# Patient Record
Sex: MALE | Race: WHITE | NOT HISPANIC OR LATINO | Employment: OTHER | URBAN - METROPOLITAN AREA
[De-identification: names, ages, dates, MRNs, and addresses within clinical notes are randomized per-mention and may not be internally consistent; named-entity substitution may affect disease eponyms.]

---

## 2017-06-02 ENCOUNTER — OFFICE VISIT (OUTPATIENT)
Dept: DERMATOLOGY | Facility: CLINIC | Age: 67
End: 2017-06-02
Payer: COMMERCIAL

## 2017-06-02 DIAGNOSIS — L81.4 SOLAR LENTIGINOSIS: ICD-10-CM

## 2017-06-02 DIAGNOSIS — Z86.018 HISTORY OF DYSPLASTIC NEVUS: Primary | ICD-10-CM

## 2017-06-02 DIAGNOSIS — L73.9 FOLLICULITIS: ICD-10-CM

## 2017-06-02 DIAGNOSIS — L57.0 ACTINIC KERATOSIS: ICD-10-CM

## 2017-06-02 PROCEDURE — 99207 ZZC NO BILLABLE SERVICE THIS VISIT: CPT | Performed by: DERMATOLOGY

## 2017-06-02 RX ORDER — MUPIROCIN 20 MG/G
OINTMENT TOPICAL
Qty: 22 G | Refills: 0 | Status: SHIPPED | OUTPATIENT
Start: 2017-06-02 | End: 2017-10-17

## 2017-06-02 NOTE — NURSING NOTE
Dermatology Rooming Note    Duane W Dahlstrom's goals for this visit include:   Chief Complaint   Patient presents with     Derm Problem     6 month skin check hx of AK and DN       Is a scribe okay for this visit:YES    Are records needed for this visit(If yes, obtain release of information): Not applicable     Vitals: There were no vitals taken for this visit.    Referring Provider:  No referring provider defined for this encounter.

## 2017-06-02 NOTE — PATIENT INSTRUCTIONS
Cryotherapy    What is it?    Use of a very cold liquid, such as liquid nitrogen, to freeze and destroy abnormal skin cells that need to be removed    What should I expect?    Tenderness and redness    A small blister that might grow and fill with dark purple blood. There may be crusting.    More than one treatment may be needed if the lesions do not go away.    How do I care for the treated area?    Gently wash the area with your hands when bathing.    Use a thin layer of Vaseline to help with healing. You may use a Band-Aid.     The area should heal within 7-10 days and may leave behind a pink or lighter color.     Do not use an antibiotic or Neosporin ointment.     You may take acetaminophen (Tylenol) for pain.     Call your Doctor if you have:    Severe pain    Signs of infection (warmth, redness, cloudy yellow drainage, and or a bad smell)    Questions or concerns    Who should I call with questions?       [facility]: [phone]       [facility]: [phone]       For urgent needs outside of business hours call the [facility] at [phone]        and ask for the dermatology resident on call

## 2017-06-02 NOTE — PROGRESS NOTES
[facility] Dermatology Note      Dermatology Problem List:  1. Actinic Keratosis   -s/p cryotherapy  2. History of atypical/dysplastic nevi  -Junctional nevus with atypical features, right (lateral) thigh, s/p excision 12/20/2016  -Junctional nevus with moderate dysplasia, lower paraspinal back, s/p biopsy 11/14/2016  -Junctional dysplastic nevus with mild atypia, left chest, s/p biopsy 4/10/2015  -Lentiginous compound melanocytic nevus with mild atypia, upper back, s/p biopsy 4/10/2015  -Patient reports two prior atypical lesions in 1980  3. Neurofibroma, upper mid back, s/p biopsy 11/9/2015  4. Folliculitis,     Encounter Date: Jun 2, 2017    CC:  Chief Complaint   Patient presents with     Derm Problem       History of Present Illness:  Mr. Duane W Dahlstrom is a 66 year old male who presents as a follow-up for history of atypical/dysplastic nevi. The patient was last seen 12/20/2016 by Dr. Nguyen when an atypical nevus on the right (lateral) thigh was treated with excision. Today, the patient reports the tip of his nose can be sore and red for the past few months. He has also noticed some new lesions on his right index finger. Family has noticed redness. No changes in medical history.   Past Medical History:   Patient Active Problem List   Diagnosis     Varicose veins     CARDIOVASCULAR SCREENING; LDL GOAL LESS THAN 160     Advanced directives, counseling/discussion     Past Medical History:   Diagnosis Date     Allergic rhinitis, cause unspecified     Seasonal rhinitis     Syncope and collapse 1998     Varicose veins of leg      Past Surgical History:   Procedure Laterality Date     COLONOSCOPY  12/01/08     COLONOSCOPY N/A 11/19/2015    Procedure: COLONOSCOPY;  Surgeon: Alonso Gaitan MD;  Location:  GI     ENDOSCOPIC LIGATION VEIN(S)       HC COLONOSCOPY THRU STOMA WITH BIOPSY  1996       Social History:  The patient works as a  at [employer].  The patient uses 14 alcoholic beverages per week.  The patient denies use of tanning beds.     Family History:  There is no family history of skin cancer.  There is no family history of asthma, psoriasis, eczema or hay fever    Medications:  Current Outpatient Prescriptions   Medication Sig Dispense Refill     TYLENOL 325 MG OR TABS 1-2 TABS EVERY 4 HOURS AS NEEDED       IBUPROFEN 200 MG OR CAPS 1-3 CAPS EVERY 6 HOURS AS NEEDED for pain/fever         Allergies   Allergen Reactions     No Known Drug Allergies      Review of Systems:  -Skin: As above in HPI. No additional skin concerns.  -Const: The patient is generally feeling well today.  No recent illnesses    Physical exam:  There were no vitals taken for this visit.  GEN: This is a well developed, well-nourished male in no acute distress, in a pleasant mood.    SKIN: Total skin excluding the undergarment areas was performed. The exam included the head/face, neck, both arms, chest, back, abdomen, both legs, digits and/or nails. Declines genital exam  -There are multiple well healed surgical scars without erythema, nodularity or telangiectasias. No re pigmentation on the left para spinal lower back.   - There is an erythematous macule with overyling adherent scale on the tip of the nose.  -Multiple regular brown pigmented macules and papules are identified on the trunk and extremeties.   -Scattered brown macules on sun exposed areas.  -right index finger 1mm lesion that expresses pus with gentle pressure consistent with folliculitis  -hyperkeratotic papule on the right index DIP  -No other lesions of concern on areas examined.   Impression/Plan:  1. History of atypical nevi and dysplastic nevi, no clinical evidence or recurrence    ABCD's of melanoma were reviewed with patient and handout provided.   2. Actinic keratosis on the tip of the nose  Cryotherapy procedure note: After verbal consent and discussion of risks and benefits including but no limited to  dyspigmentation/scar, blister, and pain, 1 were treated with 1-2mm freeze border for 1 cycle with liquid nitrogen. Post cryotherapy instructions were provided  3. Multiple benign Nevi, trunk and extremities    No further intervention required at this time.   4. Solar Lentigenies    Sun precaution was advised including the use of sun screens of SPF 30 or higher, sun protective clothing, and avoidance of tanning beds.  5. Hyperkeratotic papule, right index finger- callus versus giant cell tumor of tendon sheath versus digital myoid cyst versus other    Declines biopsy. He will report any changes  6. Folliculitis- right index finger, expressed in clinic today  Mupirocin 2% ointment, apply twice daily for 3 weeks  Reviewed signs of infection     Follow up in 1, earlier for new or changing lesions.     Staff Involved:  Scribe/Staff      Scribe Disclosure:   IRonan, am serving as a scribe to document services personally performed by Dr. Lulú Fish, based on data collection and the provider's statements to me.    Scribe Disclosure:   I, Marlene Faith, am serving as a scribe to document services personally performed by Dr. Lulú Fish, based on data collection and the provider's statements to me.

## 2017-06-02 NOTE — MR AVS SNAPSHOT
After Visit Summary   6/2/2017    Duane W Dahlstrom    MRN: [id number]           Patient Information     Date Of Birth          1950        Visit Information        Provider Department      6/2/2017 9:30 AM Lulú Fish MD [facility] [[email or web address]]        Today's Diagnoses     History of dysplastic nevus    -  1    Folliculitis        Actinic keratosis        Solar lentiginosis          Care Instructions    Cryotherapy    What is it?    Use of a very cold liquid, such as liquid nitrogen, to freeze and destroy abnormal skin cells that need to be removed    What should I expect?    Tenderness and redness    A small blister that might grow and fill with dark purple blood. There may be crusting.    More than one treatment may be needed if the lesions do not go away.    How do I care for the treated area?    Gently wash the area with your hands when bathing.    Use a thin layer of Vaseline to help with healing. You may use a Band-Aid.     The area should heal within 7-10 days and may leave behind a pink or lighter color.     Do not use an antibiotic or Neosporin ointment.     You may take acetaminophen (Tylenol) for pain.     Call your Doctor if you have:    Severe pain    Signs of infection (warmth, redness, cloudy yellow drainage, and or a bad smell)    Questions or concerns    Who should I call with questions?       [facility]: [phone]       [facility]: [phone]       For urgent needs outside of business hours call the [facility] at [phone]        and ask for the dermatology resident on call            Follow-ups after your visit        Your next 10 appointments already scheduled     Nov 14, 2017 10:45 AM CST   Return Visit with Lulú Fish MD   [facility] [[email or web address]] ([facility] [[email or web address]])    [address] [address] [place] [address] [phone]              Who to contact     If you have  questions or need follow up information about today's clinic visit or your schedule please contact [facility] directly at [phone].  Normal or non-critical lab and imaging results will be communicated to you by [employer], letter or phone within 4 business days after the clinic has received the results. If you do not hear from us within 7 days, please contact the clinic through [employer] or phone. If you have a critical or abnormal lab result, we will notify you by phone as soon as possible.  Submit refill requests through [employer] or call your pharmacy and they will forward the refill request to us. Please allow 3 business days for your refill to be completed.          Additional Information About Your Visit        [employer] Information     [employer] is an electronic gateway that provides easy, online access to your medical records. With [employer], you can request a clinic appointment, read your test results, renew a prescription or communicate with your care team.     To sign up for [employer] visit the website at [email or web address]   You will be asked to enter the access code listed below, as well as some personal information. Please follow the directions to create your username and password.     Your access code is: [email or web address]  Expires: 2017  9:59 AM     Your access code will  in 90 days. If you need help or a new code, please contact your [facility] or call [phone] for assistance.        Care EveryWhere ID     This is your Care EveryWhere ID. This could be used by other organizations to access your [place] medical records  [id number]         Blood Pressure from Last 3 Encounters:   16 131/86   16 118/60   11/19/15 99/76    Weight from Last 3 Encounters:   10/05/16 78.5 kg (173 lb 1.6 oz)   16 78.5 kg (173 lb 1.6 oz)   11/19/15 79.4 kg (175 lb)              Today, you had the following     No orders found for display          Today's Medication Changes          These changes are accurate as of: 6/2/17  9:59 AM.  If you have any questions, ask your nurse or doctor.               Start taking these medicines.        Dose/Directions    mupirocin 2 % ointment   Commonly known as:  BACTROBAN   Used for:  Folliculitis   Started by:  Lulú Fish MD        Apply twice daily for 3 weeks.   Quantity:  22 g   Refills:  0            Where to get your medicines      These medications were sent to [id number] - [place], [place] - [address] [place] [address]  [address] [place] [id number]    Hours:  M-F 8:30-6:30; Sat 9-4; closed Sunday Phone:  [phone]     mupirocin 2 % ointment                Primary Care Provider Office Phone # Fax #    Riccardo Levy [phone]       [facility] [address] [facility] [address]        Thank you!     Thank you for choosing [facility]  for your care. Our goal is always to provide you with excellent care. Hearing back from our patients is one way we can continue to improve our services. Please take a few minutes to complete the written survey that you may receive in the mail after your visit with us. Thank you!             Your Updated Medication List - Protect others around you: Learn how to safely use, store and throw away your medicines at www.disposemymeds.org.          This list is accurate as of: 6/2/17  9:59 AM.  Always use your most recent med list.                   Brand Name Dispense Instructions for use    ibuprofen 200 MG capsule      1-3 CAPS EVERY 6 HOURS AS NEEDED for pain/fever       mupirocin 2 % ointment    BACTROBAN    22 g    Apply twice daily for 3 weeks.       TYLENOL 325 MG tablet   Generic drug:  acetaminophen      1-2 TABS EVERY 4 HOURS AS NEEDED

## 2017-10-16 ENCOUNTER — TELEPHONE (OUTPATIENT)
Dept: FAMILY MEDICINE | Facility: OTHER | Age: 67
End: 2017-10-16

## 2017-10-16 NOTE — TELEPHONE ENCOUNTER
Reason for Call:  Same Day Appointment, Requested Provider:  iRccardo Levy M.D.    PCP: Riccardo Levy    Reason for visit: knee pain/shoulder pain, started training for a marathon in January, 2017, he has stopped running but still painful.  He states the shoulder pain is from sleeping with arm over head.    Duration of symptoms: ongoing    Have you been treated for this in the past?     Additional comments: would like to be worked in this week    Can we leave a detailed message on this number? YES    Phone number patient can be reached at: Cell number on file:    Telephone Information:   Mobile [phone]       Best Time: any    Call taken on 10/16/2017 at 12:30 PM by Jonathan Olsen

## 2017-10-16 NOTE — TELEPHONE ENCOUNTER
OK for mignon Contreras Only or Open appointment, not Same Day, not Acute. Please call patient  to schedule time.  Electronically signed by Riccardo Levy MD

## 2017-10-17 ENCOUNTER — OFFICE VISIT (OUTPATIENT)
Dept: FAMILY MEDICINE | Facility: OTHER | Age: 67
End: 2017-10-17
Payer: COMMERCIAL

## 2017-10-17 ENCOUNTER — RADIANT APPOINTMENT (OUTPATIENT)
Dept: GENERAL RADIOLOGY | Facility: OTHER | Age: 67
End: 2017-10-17
Attending: PHYSICIAN ASSISTANT
Payer: COMMERCIAL

## 2017-10-17 VITALS
OXYGEN SATURATION: 98 % | DIASTOLIC BLOOD PRESSURE: 64 MMHG | BODY MASS INDEX: 24.71 KG/M2 | WEIGHT: 176.5 LBS | TEMPERATURE: 97.7 F | HEART RATE: 77 BPM | HEIGHT: 71 IN | RESPIRATION RATE: 16 BRPM | SYSTOLIC BLOOD PRESSURE: 118 MMHG

## 2017-10-17 DIAGNOSIS — M25.561 PAIN IN BOTH KNEES, UNSPECIFIED CHRONICITY: ICD-10-CM

## 2017-10-17 DIAGNOSIS — M25.511 RIGHT ANTERIOR SHOULDER PAIN: ICD-10-CM

## 2017-10-17 DIAGNOSIS — M25.562 PAIN IN BOTH KNEES, UNSPECIFIED CHRONICITY: ICD-10-CM

## 2017-10-17 DIAGNOSIS — M25.561 RIGHT KNEE PAIN, UNSPECIFIED CHRONICITY: Primary | ICD-10-CM

## 2017-10-17 PROCEDURE — 73560 X-RAY EXAM OF KNEE 1 OR 2: CPT | Mod: RT

## 2017-10-17 PROCEDURE — 99214 OFFICE O/P EST MOD 30 MIN: CPT | Performed by: PHYSICIAN ASSISTANT

## 2017-10-17 ASSESSMENT — PAIN SCALES - GENERAL: PAINLEVEL: NO PAIN (0)

## 2017-10-17 NOTE — MR AVS SNAPSHOT
After Visit Summary   10/17/2017    Duane W Dahlstrom    MRN: [id number]           Patient Information     Date Of Birth          1950        Visit Information        Provider Department      10/17/2017 3:40 PM My Baez PA-C [facility]        Today's Diagnoses     Right knee pain, unspecified chronicity    -  1    Right anterior shoulder pain        Pain in both knees, unspecified chronicity          Care Instructions    I will get xray's of the knees to evaluate for arthritis changes and will contact you with results. I would like you to use ibuprofen and ice for inflammation and will refer you to see sports medicine for further evaluation and treatment.           Follow-ups after your visit        Additional Services     ORTHO  REFERRAL       [facility] is referring you to the Orthopedic  Services at [facility].       The  Representative will assist you in the coordination of your Orthopedic and Musculoskeletal Care as prescribed by your physician.    The  Representative will call you within 1 business day to help schedule your appointment, or you may contact the  Representative at:    All areas ~ [phone]     Type of Referral : Non Surgical       Timeframe requested: Routine    Coverage of these services is subject to the terms and limitations of your health insurance plan.  Please call member services at your health plan with any benefit or coverage questions.      If X-rays, CT or MRI's have been performed, please contact the facility where they were done to arrange for , prior to your scheduled appointment.  Please bring this referral request to your appointment and present it to your specialist.                  Follow-up notes from your care team     Return if symptoms worsen or fail to improve.      Your next 10 appointments already scheduled     Nov 02, 2017  4:10 PM  "CDT   Office Visit with Riccardo Levy MD   [facility] ([facility])    [address] [facility] [address] [phone]           Bring a current list of meds and any records pertaining to this visit. For Physicals, please bring immunization records and any forms needing to be filled out. Please arrive 10 minutes early to complete paperwork.            Jun 08, 2018  9:30 AM CDT   Return Visit with Lulú Fish MD   [facility] ([facility]    [address] [place] [address] [phone]              Future tests that were ordered for you today     Open Future Orders        Priority Expected Expires Ordered    XR Knee Bilateral 1/2 Views Routine 10/17/2017 10/17/2018 10/17/2017            Who to contact     If you have questions or need follow up information about today's clinic visit or your schedule please contact [facility] directly at [phone].  Normal or non-critical lab and imaging results will be communicated to you by [employer], letter or phone within 4 business days after the clinic has received the results. If you do not hear from us within 7 days, please contact the clinic through [employer] or phone. If you have a critical or abnormal lab result, we will notify you by phone as soon as possible.  Submit refill requests through [employer] or call your pharmacy and they will forward the refill request to us. Please allow 3 business days for your refill to be completed.          Additional Information About Your Visit        [employer] Information     [employer] lets you send messages to your doctor, view your test results, renew your prescriptions, schedule appointments and more. To sign up, go to [email or web address] . Click on \"Log in\" on the left side of the screen, which will take you to the Welcome page. Then click on \"Sign up Now\" on the right side of the page.     You will be asked to enter the access code " "listed below, as well as some personal information. Please follow the directions to create your username and password.     Your access code is: [email or web address]  Expires: 1/15/2018  4:12 PM     Your access code will  in 90 days. If you need help or a new code, please call your [facility] or [phone].        Care EveryWhere ID     This is your Care EveryWhere ID. This could be used by other organizations to access your [place] medical records  [id number]        Your Vitals Were     Pulse Temperature Respirations Height Pulse Oximetry BMI (Body Mass Index)    77 97.7  F (36.5  C) (Tympanic) 16 5' 10.5\" (1.791 m) 98% 24.97 kg/m2       Blood Pressure from Last 3 Encounters:   10/17/17 118/64   16 131/86   16 118/60    Weight from Last 3 Encounters:   10/17/17 176 lb 8 oz (80.1 kg)   10/05/16 173 lb 1.6 oz (78.5 kg)   16 173 lb 1.6 oz (78.5 kg)              We Performed the Following     ORTHO  REFERRAL        Primary Care Provider Office Phone # Fax #    Riccardo Levy [phone]       [address] [facility] [address]        Equal Access to Services     [facility] ANDREY : Hadii javed ku hadasho Soomaali, waaxda luqadaha, qaybta kaalmada adeegjoanna, anyi philippe . So [facility] [phone].    ATENCIÓN: Si habla español, tiene a hood disposición servicios gratuitos de asistencia lingüística. Llame al [phone].    We comply with applicable federal civil rights laws and [place] laws. We do not discriminate on the basis of race, color, national origin, age, disability, sex, sexual orientation, or gender identity.            Thank you!     Thank you for choosing [facility]  for your care. Our goal is always to provide you with excellent care. Hearing back from our patients is one way we can continue to improve our services. Please take a few minutes to complete the written survey that you may receive in the mail after your visit with " us. Thank you!             Your Updated Medication List - Protect others around you: Learn how to safely use, store and throw away your medicines at www.disposemymeds.org.          This list is accurate as of: 10/17/17  4:12 PM.  Always use your most recent med list.                   Brand Name Dispense Instructions for use Diagnosis    ibuprofen 200 MG capsule      1-3 CAPS EVERY 6 HOURS AS NEEDED for pain/fever    Fall from ladder       TYLENOL 325 MG tablet   Generic drug:  acetaminophen      1-2 TABS EVERY 4 HOURS AS NEEDED    Fall from ladder

## 2017-10-17 NOTE — PROGRESS NOTES
SUBJECTIVE:   Duane W Dahlstrom is a 66 year old male who presents to clinic today for the following health issues:      Joint Pain    Onset: knee 10 months, shoulder 6 weeks    Description:   Location: right shoulder and right knee  Character: Dull ache and Burning    Intensity: moderate    Progression of Symptoms: worse    Accompanying Signs & Symptoms:  Other symptoms: none    History:   Previous similar pain: YES- knee injury 50 years ago      Precipitating factors:   Trauma or overuse: YES- running    Alleviating factors:  Improved by: rest/inactivity    Therapies Tried and outcome: ibuprofen- no relief       Patient has had knee pain more on the right than the left for about 10 months. He has a history of running long distances and had begun training for a half marathon last January which caused an acute increase in right knee pain. He stopped running and has tried ice and antiinflammatories but symptoms have not resolved.     Patient also has been having anterior right shoulder pain since sleeping with his arm awkwardly above his head about 6 weeks ago. He has tenderness to the anterior deltoid and pain with certain movements, no loss of ROM or strength.     -------------------------------------    Problem list and histories reviewed & adjusted, as indicated.  Additional history: as documented    BP Readings from Last 3 Encounters:   10/17/17 118/64   12/20/16 131/86   09/19/16 118/60    Wt Readings from Last 3 Encounters:   10/17/17 176 lb 8 oz (80.1 kg)   10/05/16 173 lb 1.6 oz (78.5 kg)   09/19/16 173 lb 1.6 oz (78.5 kg)        Reviewed and updated as needed this visit by clinical staffTobacco  Allergies  Meds  Problems  Med Hx  Surg Hx  Fam Hx  Soc Hx        Reviewed and updated as needed this visit by Provider  Tobacco  Allergies  Meds  Problems  Med Hx  Surg Hx  Fam Hx  Soc Hx          ROS:  Constitutional, HEENT, cardiovascular, pulmonary, gi and gu systems are negative, except as  "otherwise noted.      OBJECTIVE:   /64 (BP Location: Right arm, Patient Position: Sitting, Cuff Size: Adult Regular)  Pulse 77  Temp 97.7  F (36.5  C) (Tympanic)  Resp 16  Ht 5' 10.5\" (1.791 m)  Wt 176 lb 8 oz (80.1 kg)  SpO2 98%  BMI 24.97 kg/m2  Body mass index is 24.97 kg/(m^2).  GENERAL: alert and no distress  RESP: lungs clear to auscultation - no rales, rhonchi or wheezes  CV: regular rates and rhythm, peripheral pulses strong and no peripheral edema  MS: tenderness to palpation of anterior right shoulder, pain with reaching across body and internal rotation. No noted knee deformity, pain to palpation along the medial collateral area of the right knee.   SKIN: no suspicious lesions or rashes    Diagnostic Test Results:  Xray knees- pending    ASSESSMENT/PLAN:       ICD-10-CM    1. Right knee pain, unspecified chronicity M25.561 ORTHO  REFERRAL   2. Right anterior shoulder pain M25.511 ORTHO  REFERRAL   3. Pain in both knees, unspecified chronicity M25.561 XR Knee Bilateral 1/2 Views    M25.562 ORTHO  REFERRAL       I will refer patient to sports medicine for further evaluation of knee and shoulder pain.   See Patient Instructions    My Baez PA-C  [facility]    "

## 2017-10-17 NOTE — PATIENT INSTRUCTIONS
I will get xray's of the knees to evaluate for arthritis changes and will contact you with results. I would like you to use ibuprofen and ice for inflammation and will refer you to see sports medicine for further evaluation and treatment.

## 2017-10-17 NOTE — NURSING NOTE
"Chief Complaint   Patient presents with     Knee Pain     Shoulder Pain     right 6 weeks,bilateral, worse onright knee caused from running, since 1/2017       Initial /64 (BP Location: Right arm, Patient Position: Sitting, Cuff Size: Adult Regular)  Pulse 77  Temp 97.7  F (36.5  C) (Tympanic)  Resp 16  Ht 5' 10.5\" (1.791 m)  Wt 176 lb 8 oz (80.1 kg)  SpO2 98%  BMI 24.97 kg/m2 Estimated body mass index is 24.97 kg/(m^2) as calculated from the following:    Height as of this encounter: 5' 10.5\" (1.791 m).    Weight as of this encounter: 176 lb 8 oz (80.1 kg).  Medication Reconciliation: complete     Health Maintenance Due   Topic Date Due     HEPATITIS C SCREENING  12/09/1970     ADVANCE DIRECTIVE PLANNING Q5 YRS  11/14/2016     INFLUENZA VACCINE (SYSTEM ASSIGNED)  09/01/2017     Farzana Lopez MA     10/17/2017    "

## 2017-10-24 ENCOUNTER — OFFICE VISIT (OUTPATIENT)
Dept: ORTHOPEDICS | Facility: CLINIC | Age: 67
End: 2017-10-24
Payer: COMMERCIAL

## 2017-10-24 VITALS
HEART RATE: 68 BPM | WEIGHT: 176.5 LBS | BODY MASS INDEX: 24.71 KG/M2 | HEIGHT: 71 IN | SYSTOLIC BLOOD PRESSURE: 142 MMHG | DIASTOLIC BLOOD PRESSURE: 90 MMHG

## 2017-10-24 DIAGNOSIS — M25.561 CHRONIC PAIN OF BOTH KNEES: Primary | ICD-10-CM

## 2017-10-24 DIAGNOSIS — M25.562 CHRONIC PAIN OF BOTH KNEES: Primary | ICD-10-CM

## 2017-10-24 DIAGNOSIS — G89.29 CHRONIC PAIN OF BOTH KNEES: Primary | ICD-10-CM

## 2017-10-24 PROCEDURE — 99204 OFFICE O/P NEW MOD 45 MIN: CPT | Performed by: PHYSICAL MEDICINE & REHABILITATION

## 2017-10-24 RX ORDER — MELOXICAM 15 MG/1
15 TABLET ORAL DAILY
Qty: 30 TABLET | Refills: 1 | Status: SHIPPED | OUTPATIENT
Start: 2017-10-24 | End: 2017-11-30

## 2017-10-24 NOTE — MR AVS SNAPSHOT
After Visit Summary   10/24/2017    Duane W Dahlstrom    MRN: [id number]           Patient Information     Date Of Birth          1950        Visit Information        Provider Department      10/24/2017 3:40 PM Ginger Cortes MD [facility]        Today's Diagnoses     Chronic pain of both knees    -  1      Care Instructions    Today's Plan of Care:  -Brace as needed   -Prescription Medication as directed: meloxicam. Please take this medication with food.  DO NOT take any other nonsteroidal anti-inflammatory drugs (NSAIDs) such as Advil, ibuprofen, Aleve, naproxen, etc while on this medication.  -Rehab: Physical Therapy: [facility] - [phone]. Please do 5-6 days of exercises per week between formal sessions and the home exercises they provide.  -Ice 15-20 minutes for pain relief or swelling as needed  -Recommend low impact cardiovascular activities    -We also discussed other future treatment options:  Steroid injection of right knee    Follow Up: 4-6 weeks or sooner if symptoms fail to improve or worsen. Call with any questions or concerns.                 Follow-ups after your visit        Additional Services     PHYSICAL THERAPY REFERRAL       *This therapy referral will be filtered to a centralized scheduling office at [facility] and the patient will receive a call to schedule an appointment at a [place] location most convenient for them. *     [facility] provides Physical Therapy evaluation and treatment and many specialty services across the [place] system.  If requesting a specialty program, please choose from the list below.    If you have not heard from the scheduling office within 2 business days, please call [phone] for all locations, with the exception of [place], please call [phone].  Treatment: Evaluation & Treatment  Special Instructions/Modalities: none  Special Programs: None    Please  "be aware that coverage of these services is subject to the terms and limitations of your health insurance plan.  Call member services at your health plan with any benefit or coverage questions.      **Note to Provider:  If you are referring outside of [facility] for the therapy appointment, please list the name of the location in the \"special instructions\" above, print the referral and give to the patient to schedule the appointment.                  Your next 10 appointments already scheduled     Nov 02, 2017  4:10 PM CDT   [facility]    [address] [facility] [address] [phone]           Bring a current list of meds and any records pertaining to this visit. For Physicals, please bring immunization records and any forms needing to be filled out. Please arrive 10 minutes early to complete paperwork.            Jun 08, 2018  9:30 AM CDT   Return Visit with Lulú Fish MD   [facility] ([facility]    [address] [place] [id number] [phone]              Who to contact     If you have questions or need follow up information about today's clinic visit or your schedule please contact [facility] directly at [phone].  Normal or non-critical lab and imaging results will be communicated to you by [facility], letter or phone within 4 business days after the clinic has received the results. If you do not hear from us within 7 days, please contact the clinic through [facility] or phone. If you have a critical or abnormal lab result, we will notify you by phone as soon as possible.  Submit refill requests through [facility] or call your pharmacy and they will forward the refill request to us. Please allow 3 business days for your refill to be completed.          Additional Information About Your Visit        [facility] Information     [facility] lets you send messages to your " "doctor, view your test results, renew your prescriptions, schedule appointments and more. To sign up, go to [email or web address] . Click on \"Log in\" on the left side of the screen, which will take you to the Welcome page. Then click on \"Sign up Now\" on the right side of the page.     You will be asked to enter the access code listed below, as well as some personal information. Please follow the directions to create your username and password.     Your access code is: [email or web address]  Expires: 1/15/2018  4:12 PM     Your access code will  in 90 days. If you need help or a new code, please call your [facility] clinic or [phone].        Care EveryWhere ID     This is your Care EveryWhere ID. This could be used by other organizations to access your [facility] medical records  [id number]        Your Vitals Were     Pulse Height BMI (Body Mass Index)             68 5' 10.5\" (1.791 m) 24.97 kg/m2          Blood Pressure from Last 3 Encounters:   10/24/17 142/90   10/17/17 118/64   16 131/86    Weight from Last 3 Encounters:   10/24/17 176 lb 8 oz (80.1 kg)   10/17/17 176 lb 8 oz (80.1 kg)   10/05/16 173 lb 1.6 oz (78.5 kg)              We Performed the Following     PHYSICAL THERAPY REFERRAL          Today's Medication Changes          These changes are accurate as of: 10/24/17  4:22 PM.  If you have any questions, ask your nurse or doctor.               Start taking these medicines.        Dose/Directions    meloxicam 15 MG tablet   Commonly known as:  MOBIC   Used for:  Chronic pain of both knees        Dose:  15 mg   Take 1 tablet (15 mg) by mouth daily   Quantity:  30 tablet   Refills:  1            Where to get your medicines      These medications were sent to [id number] - [place] - [address] [place] [address]  [address] [place] [address] [facility] [address]    Hours:  M-F 8:30-6:30; Sat 9-4; closed  Phone:  [phone]     meloxicam 15 MG tablet                Primary Care Provider Office Phone # Fax #    " Riccardo Levy [phone]       [address] [facility] [address]        Equal Access to Services     DAVIDE BALDERAS : Hadii aad ku hadashleymónica Anniehood, wareggieda christinradhaha, qageorgita kacrispinda nicolereynaldo, anyi alessandrain hayaan nicoleaby valeriaguido laGayegayatri pryor. So [facility] [phone].    ATENCIÓN: Si habla español, tiene a hood disposición servicios gratuitos de asistencia lingüística. Llame al [phone].    We comply with applicable federal civil rights laws and [place] laws. We do not discriminate on the basis of race, color, national origin, age, disability, sex, sexual orientation, or gender identity.            Thank you!     Thank you for choosing [facility]  for your care. Our goal is always to provide you with excellent care. Hearing back from our patients is one way we can continue to improve our services. Please take a few minutes to complete the written survey that you may receive in the mail after your visit with us. Thank you!             Your Updated Medication List - Protect others around you: Learn how to safely use, store and throw away your medicines at www.disposemymeds.org.          This list is accurate as of: 10/24/17  4:22 PM.  Always use your most recent med list.                   Brand Name Dispense Instructions for use Diagnosis    ibuprofen 200 MG capsule      1-3 CAPS EVERY 6 HOURS AS NEEDED for pain/fever    Fall from ladder       meloxicam 15 MG tablet    MOBIC    30 tablet    Take 1 tablet (15 mg) by mouth daily    Chronic pain of both knees       TYLENOL 325 MG tablet   Generic drug:  acetaminophen      1-2 TABS EVERY 4 HOURS AS NEEDED    Fall from ladder

## 2017-10-24 NOTE — PROGRESS NOTES
Sports Medicine Clinic Visit    PCP: Riccardo Levy    CC: Patient presents with:  Knee Pain: bilateral      HPI:  Duane W Dahlstrom is a 66 year old male who is seen in consultation at the request of My Baez PA-C.   He notes bilateral knee pain that began in January or February when he began running and training for a marathon. He has run marathons before however this was after a couple month break from running.   He rates the pain at a 4/10 at its worst and a 3/10 currently.  Symptoms are relieved with rest. Symptoms are worsened by running, stairs, activity and walking. He endorses swelling.  He denies bruising, popping, grinding, catching, locking, instability, numbness, tingling,weakness, pain in other joints and fever, chills.  Other treatment has included ibuprofen.       He also notes right shoulder pain that began 2 months ago.       Review of Systems:  Musculoskeletal: as above  Remainder of review of systems is negative including constitutional, eyes, ENT, CV, pulmonary, GI, , endocrine, skin, hematologic, and neurologic except as noted in HPI or medical history.    History reviewed. No pertinent past surgical/medical/family/social history other than as mentioned in HPI.    Past Medical History:   Diagnosis Date     Allergic rhinitis, cause unspecified     Seasonal rhinitis     Syncope and collapse 1998     Varicose veins of leg      Past Surgical History:   Procedure Laterality Date     COLONOSCOPY  12/01/08     COLONOSCOPY N/A 11/19/2015    Procedure: COLONOSCOPY;  Surgeon: Alonso Gaitan MD;  Location: [facility]     ENDOSCOPIC LIGATION VEIN(S)       HC COLONOSCOPY THRU STOMA WITH BIOPSY  1996     Family History   Problem Relation Age of Onset     Cancer - colorectal Father      Prostate Cancer Father      HEART DISEASE Father      by-pass surgery in 1993     Social History     Social History     Marital status:      Spouse name: Violetta     Number of children: 2     Years of education:  "16     Occupational History      [employer]     Social History Main Topics     Smoking status: Never Smoker     Smokeless tobacco: Never Used     Alcohol use Yes      Comment: social      Drug use: No     Sexual activity: Yes     Partners: Female     Other Topics Concern      Service No     Blood Transfusions No     Caffeine Concern No     Occupational Exposure No     Hobby Hazards No     Sleep Concern No     Stress Concern No     Weight Concern No     Special Diet No     Back Care No     Exercise Yes     walk     Seat Belt Yes     Social History Narrative       He works as a .     Current Outpatient Prescriptions   Medication     meloxicam (MOBIC) 15 MG tablet     order for DME     TYLENOL 325 MG OR TABS     IBUPROFEN 200 MG OR CAPS     No current facility-administered medications for this visit.      Allergies   Allergen Reactions     No Known Drug Allergies          Objective:  /90  Pulse 68  Ht 5' 10.5\" (1.791 m)  Wt 176 lb 8 oz (80.1 kg)  BMI 24.97 kg/m2    General: Alert and in no distress    Head: Normocephalic, atraumatic  Eyes: no scleral icterus or conjunctival erythema   Oropharynx:  Mucous membranes moist  Skin: no erythema, petechiae, or jaundice  CV: regular rhythm by palpation, 2+ distal pulses  Resp: normal respiratory effort without conversational dyspnea   Psych: normal mood and affect    Gait: Non-antalgic, appropriate coordination and balance   Neuro: Motor strength and sensation as noted below    Musculoskeletal:    Bilateral Knee exam    Inspection:  No erythema, ecchymosis, warmth, or edema    Palpation: Right medial joint line tenderness    Knee ROM: Full active and passive ROM. Right knee flexion painful.    Hip ROM: Full active and passive ROM without pain    Patellar Motion:      Normal patellar tracking noted through range of motion    Strength:  5/5 Hip flexion/abduction/adduction, knee extension/flexion, ankle plantarflexion/dorsiflexion, " great toe extension, toe flexion    Special Tests: Negative log roll, negative anterior/posterior drawer, negative Lachman's, no varus/valgus instability at 0 and 30 degrees, Ivonne's test negative for pain or popping at the lateral/medial joint line    Sensation:  Intact to light touch in the bilateral lower extremities      Radiology:  Independent visualization of images performed.    Recent Results (from the past 744 hour(s))   XR Knee Bilateral 1/2 Views    Narrative    KNEE BILATERAL ONE TO TWO VIEWS  10/17/2017 4:40 PM     HISTORY: Pain in knees.    COMPARISON: Left (contralateral) knee x-rays dated 10/11/2009.    FINDINGS: No fracture or malalignment is identified. There is  suggestion of tiny bilateral knee joint with collections. Joint spaces  are maintained.       Impression    IMPRESSION:  Question tiny bilateral knee joint effusion. Otherwise  negative bilateral knee x-rays.    KEVIN LEBLANC MD       Assessment:  1. Chronic pain of both knees        Plan:  Discussed the assessment with the patient and developed a plan together:  -Patellar stabilization brace provided to wear as needed for comfort, support, and pain relief.  -Meloxicam 15 mg prescribed. Please take this medication with food.  DO NOT take any other nonsteroidal anti-inflammatory drugs (NSAIDs) such as Advil, ibuprofen, Aleve, naproxen, etc while on this medication.  -Rehab: Physical Therapy: [facility] - [phone]. Please do 5-6 days of exercises per week between formal sessions and the home exercises they provide.  -Ice 15-20 minutes for pain relief or swelling as needed  -Recommend low impact cardiovascular activities, such as stationary biking, walking, using the elliptical, swimming, or other aquatic exercise.    -We also discussed other future treatment options:  Steroid injection of right knee    Follow Up: 4-6 weeks or sooner if symptoms fail to improve or worsen. Call with any questions or concerns.  Follow up as  desired for evaluation of the right shoulder.      Lizz Cortes MD, CAQ  [facility]

## 2017-10-24 NOTE — PATIENT INSTRUCTIONS
Today's Plan of Care:  -Brace as needed   -Prescription Medication as directed: meloxicam. Please take this medication with food.  DO NOT take any other nonsteroidal anti-inflammatory drugs (NSAIDs) such as Advil, ibuprofen, Aleve, naproxen, etc while on this medication.  -Rehab: [facility] - [phone]. Please do 5-6 days of exercises per week between formal sessions and the home exercises they provide.  -Ice 15-20 minutes for pain relief or swelling as needed  -Recommend low impact cardiovascular activities    -We also discussed other future treatment options:  Steroid injection of right knee    Follow Up: 4-6 weeks or sooner if symptoms fail to improve or worsen. Call with any questions or concerns.

## 2017-11-30 ENCOUNTER — OFFICE VISIT (OUTPATIENT)
Dept: FAMILY MEDICINE | Facility: OTHER | Age: 67
End: 2017-11-30
Payer: COMMERCIAL

## 2017-11-30 VITALS
HEIGHT: 70 IN | BODY MASS INDEX: 25.14 KG/M2 | OXYGEN SATURATION: 98 % | WEIGHT: 175.6 LBS | HEART RATE: 80 BPM | TEMPERATURE: 98 F | SYSTOLIC BLOOD PRESSURE: 116 MMHG | RESPIRATION RATE: 16 BRPM | DIASTOLIC BLOOD PRESSURE: 70 MMHG

## 2017-11-30 DIAGNOSIS — Z11.59 NEED FOR HEPATITIS C SCREENING TEST: ICD-10-CM

## 2017-11-30 DIAGNOSIS — Z02.89 HEALTH EXAMINATION OF DEFINED SUBPOPULATION: Primary | ICD-10-CM

## 2017-11-30 DIAGNOSIS — Z80.42 FAMILY HISTORY OF PROSTATE CANCER: ICD-10-CM

## 2017-11-30 LAB — PSA SERPL-ACNC: 2.07 UG/L (ref 0–4)

## 2017-11-30 PROCEDURE — G0103 PSA SCREENING: HCPCS | Performed by: FAMILY MEDICINE

## 2017-11-30 PROCEDURE — 99396 PREV VISIT EST AGE 40-64: CPT | Performed by: FAMILY MEDICINE

## 2017-11-30 PROCEDURE — 86803 HEPATITIS C AB TEST: CPT | Performed by: FAMILY MEDICINE

## 2017-11-30 PROCEDURE — 36415 COLL VENOUS BLD VENIPUNCTURE: CPT | Performed by: FAMILY MEDICINE

## 2017-11-30 ASSESSMENT — PAIN SCALES - GENERAL: PAINLEVEL: NO PAIN (0)

## 2017-11-30 NOTE — PROGRESS NOTES
SUBJECTIVE:   CC: Duane W Dahlstrom is an 66 year old male who presents for preventative health visit.     Physical   Annual:     Getting at least 3 servings of Calcium per day::  Yes    Bi-annual eye exam::  Yes    Dental care twice a year::  Yes    Sleep apnea or symptoms of sleep apnea::  None    Diet::  Regular (no restrictions)    Taking medications regularly::  Yes    Additional concerns today::  No            Today's PHQ-2 Score:   PHQ-2 ( 1999 [employer]) 11/30/2017   Q1: Little interest or pleasure in doing things 0   Q2: Feeling down, depressed or hopeless 0   PHQ-2 Score 0   Q1: Little interest or pleasure in doing things Not at all   Q2: Feeling down, depressed or hopeless Not at all   PHQ-2 Score 0       Abuse: Current or Past(Physical, Sexual or Emotional)- No  Do you feel safe in your environment - Yes    Social History   Substance Use Topics     Smoking status: Never Smoker     Smokeless tobacco: Never Used     Alcohol use Yes      Comment: social      The patient does not drink >3 drinks per day nor >7 drinks per week.    Last PSA:   PSA   Date Value Ref Range Status   01/13/2015 2.22 0 - 4 ug/L Final       Reviewed orders with patient. Reviewed health maintenance and updated orders accordingly - Yes  Labs reviewed in EPIC  BP Readings from Last 3 Encounters:   11/30/17 116/70   10/24/17 142/90   10/17/17 118/64    Wt Readings from Last 3 Encounters:   11/30/17 175 lb 9.6 oz (79.7 kg)   10/24/17 176 lb 8 oz (80.1 kg)   10/17/17 176 lb 8 oz (80.1 kg)                  Patient Active Problem List   Diagnosis     Varicose veins     CARDIOVASCULAR SCREENING; LDL GOAL LESS THAN 160     Advanced directives, counseling/discussion     Past Surgical History:   Procedure Laterality Date     COLONOSCOPY  12/01/08     COLONOSCOPY N/A 11/19/2015    Procedure: COLONOSCOPY;  Surgeon: Alonso Gaitan MD;  Location: [facility]     ENDOSCOPIC LIGATION VEIN(S)       HC COLONOSCOPY THRU STOMA WITH BIOPSY  1996       Social  History   Substance Use Topics     Smoking status: Never Smoker     Smokeless tobacco: Never Used     Alcohol use Yes      Comment: social      Family History   Problem Relation Age of Onset     Cancer - colorectal Father      Prostate Cancer Father      HEART DISEASE Father      by-pass surgery in 1993         Current Outpatient Prescriptions   Medication Sig Dispense Refill     order for DME Equipment being ordered: patellar stabilization brace with buttress, LG (Patient not taking: Reported on 11/30/2017) 1 Device 0     TYLENOL 325 MG OR TABS 1-2 TABS EVERY 4 HOURS AS NEEDED       IBUPROFEN 200 MG OR CAPS 1-3 CAPS EVERY 6 HOURS AS NEEDED for pain/fever       Allergies   Allergen Reactions     No Known Drug Allergies      Recent Labs   Lab Test  01/13/15   0828  11/25/13   0839  11/14/11   0850   LDL  94  120  130*   HDL  49  62  64   TRIG  75  77  69   CR  0.97  1.03  0.95   GFRESTIMATED  78  74  81   GFRESTBLACK  >90   GFR Calc    89  >90   POTASSIUM  3.6  4.4  4.0   TSH   --   1.16   --               Reviewed and updated as needed this visit by clinical staffTobacco  Allergies  Meds  Problems  Med Hx  Surg Hx  Fam Hx  Soc Hx          Reviewed and updated as needed this visit by Provider  Allergies  Meds  Problems            Review of Systems  C: NEGATIVE for fever, chills, change in weight  I: NEGATIVE for worrisome rashes, moles or lesions  E: NEGATIVE for vision changes or irritation  ENT: NEGATIVE for ear, mouth and throat problems  R: NEGATIVE for significant cough or SOB  CV: NEGATIVE for chest pain, palpitations or peripheral edema  GI: NEGATIVE for nausea, abdominal pain, heartburn, or change in bowel habits   male: negative for dysuria, hematuria, decreased urinary stream, erectile dysfunction, urethral discharge  M: NEGATIVE for significant arthralgias or myalgia  N: NEGATIVE for weakness, dizziness or paresthesias  P: NEGATIVE for changes in mood or affect    OBJECTIVE:  "  /70 (BP Location: Left arm, Patient Position: Chair, Cuff Size: Adult Large)  Pulse 80  Temp 98  F (36.7  C) (Temporal)  Resp 16  Ht 5' 9.5\" (1.765 m)  Wt 175 lb 9.6 oz (79.7 kg)  SpO2 98%  BMI 25.56 kg/m2    Physical Exam  GENERAL: healthy, alert and no distress  EYES: Eyes grossly normal to inspection, PERRL and conjunctivae and sclerae normal  HENT: ear canals and TM's normal, nose and mouth without ulcers or lesions  NECK: no adenopathy, no asymmetry, masses, or scars and thyroid normal to palpation  RESP: lungs clear to auscultation - no rales, rhonchi or wheezes  CV: regular rate and rhythm, normal S1 S2, no S3 or S4, no murmur, click or rub, no peripheral edema and peripheral pulses strong  ABDOMEN: soft, nontender, no hepatosplenomegaly, no masses and bowel sounds normal  MS: no gross musculoskeletal defects noted, no edema  SKIN: no suspicious lesions or rashes  NEURO: Normal strength and tone, mentation intact and speech normal  PSYCH: mentation appears normal, affect normal/bright    ASSESSMENT/PLAN:       ICD-10-CM    1. Health examination of defined subpopulation Z02.89    2. Need for hepatitis C screening test Z11.59 Hepatitis C antibody   3. Family history of prostate cancer Z80.42 PSA, screen       COUNSELING:   Reviewed preventive health counseling, as reflected in patient instructions       Regular exercise       Healthy diet/nutrition       Vision screening       Immunizations    Vaccinated for: Pneumococcal           Colon cancer screening       Prostate cancer screening       The 10-year ASCVD risk score (Vivienlaney BRANDT Jr, et al., 2013) is: 8.5%    Values used to calculate the score:      Age: 66 years      Sex: Male      Is Non- : No      Diabetic: No      Tobacco smoker: No      Systolic Blood Pressure: 116 mmHg      Is BP treated: No      HDL Cholesterol: 49 mg/dL      Total Cholesterol: 158 mg/dL         reports that he has never smoked. He has never used " "smokeless tobacco.      Estimated body mass index is 25.56 kg/(m^2) as calculated from the following:    Height as of this encounter: 5' 9.5\" (1.765 m).    Weight as of this encounter: 175 lb 9.6 oz (79.7 kg).         Counseling Resources:  ATP IV Guidelines  Pooled Cohorts Equation Calculator  FRAX Risk Assessment  ICSI Preventive Guidelines  Dietary Guidelines for Americans, 2010  USDA's MyPlate  ASA Prophylaxis  Lung CA Screening    Riccardo Levy MD  [facility]  "

## 2017-11-30 NOTE — LETTER
December 1, 2017      Duane W Dahlstrom  [id number] [address] [place] [id number]        Dear ,    We are writing to inform you of your test results.    PSA is stable and one time screen for hepatitis C was negative.    Resulted Orders   Hepatitis C antibody   Result Value Ref Range    Hepatitis C Antibody Nonreactive NR^Nonreactive      Comment:      Assay performance characteristics have not been established for newborns,   infants, and children     PSA, screen   Result Value Ref Range    PSA 2.07 0 - 4 ug/L      Comment:      Assay Method:  Chemiluminescence using Siemens Vista analyzer       If you have any questions or concerns, please call the clinic at the number listed above.       Sincerely,        Riccardo Levy MD

## 2017-11-30 NOTE — NURSING NOTE
"Chief Complaint   Patient presents with     Physical       Initial /70 (BP Location: Left arm, Patient Position: Chair, Cuff Size: Adult Large)  Pulse 80  Temp 98  F (36.7  C) (Temporal)  Resp 16  Ht 5' 9.5\" (1.765 m)  Wt 175 lb 9.6 oz (79.7 kg)  SpO2 98%  BMI 25.56 kg/m2 Estimated body mass index is 25.56 kg/(m^2) as calculated from the following:    Height as of this encounter: 5' 9.5\" (1.765 m).    Weight as of this encounter: 175 lb 9.6 oz (79.7 kg).  Medication Reconciliation: complete     Lizz Wood MA 11/30/2017  10:06 AM          "

## 2017-11-30 NOTE — MR AVS SNAPSHOT
After Visit Summary   11/30/2017    Duane W Dahlstrom    MRN: [id number]           Patient Information     Date Of Birth          1950        Visit Information        Provider Department      11/30/2017 9:40 AM Riccardo Levy MD [facility]        Today's Diagnoses     Health examination of defined subpopulation    -  1    Need for hepatitis C screening test        Family history of prostate cancer          Care Instructions      Preventive Health Recommendations  Male Ages 50 - 64    Yearly exam:             See your health care provider every year in order to  o   Review health changes.   o   Discuss preventive care.    o   Review your medicines if your doctor has prescribed any.     Have a cholesterol test every 5 years, or more frequently if you are at risk for high cholesterol/heart disease.     Have a diabetes test (fasting glucose) every three years. If you are at risk for diabetes, you should have this test more often.     Have a colonoscopy at age 50, or have a yearly FIT test (stool test). These exams will check for colon cancer.      Talk with your health care provider about whether or not a prostate cancer screening test (PSA) is right for you.    You should be tested each year for STDs (sexually transmitted diseases), if you re at risk.     Shots: Get a flu shot each year. Get a tetanus shot every 10 years.     Nutrition:    Eat at least 5 servings of fruits and vegetables daily.     Eat whole-grain bread, whole-wheat pasta and brown rice instead of white grains and rice.     Talk to your provider about Calcium and Vitamin D.     Lifestyle    Exercise for at least 150 minutes a week (30 minutes a day, 5 days a week). This will help you control your weight and prevent disease.     Limit alcohol to one drink per day.     No smoking.     Wear sunscreen to prevent skin cancer.     See your dentist every six months for an exam and cleaning.     See your eye doctor every 1 to 2  "years.            Follow-ups after your visit        Follow-up notes from your care team     Return in about 1 year (around 2018) for Physical Exam.      Your next 10 appointments already scheduled     2018  9:30 AM CDT   [facility] Lulú Fish MD   [facility]    [address] [address] [place] [address] [phone]              Who to contact     If you have questions or need follow up information about today's clinic visit or your schedule please contact [facility] directly at [phone].  Normal or non-critical lab and imaging results will be communicated to you by MyChart, letter or phone within 4 business days after the clinic has received the results. If you do not hear from us within 7 days, please contact the clinic through MyChart or phone. If you have a critical or abnormal lab result, we will notify you by phone as soon as possible.  Submit refill requests through [employer] or call your pharmacy and they will forward the refill request to us. Please allow 3 business days for your refill to be completed.          Additional Information About Your Visit        [employer] Information     [employer] lets you send messages to your doctor, view your test results, renew your prescriptions, schedule appointments and more. To sign up, go to [email or web address] . Click on \"Log in\" on the left side of the screen, which will take you to the Welcome page. Then click on \"Sign up Now\" on the right side of the page.     You will be asked to enter the access code listed below, as well as some personal information. Please follow the directions to create your username and password.     Your access code is: [email or web address]  Expires: 1/15/2018  3:12 PM     Your access code will  in 90 days. If you need help or a new code, please call your [facility] or [phone].        Care EveryWhere ID     This is your Care EveryWhere ID. " "This could be used by other organizations to access your [place] medical records  [id number]        Your Vitals Were     Pulse Temperature Respirations Height Pulse Oximetry BMI (Body Mass Index)    80 98  F (36.7  C) (Temporal) 16 5' 9.5\" (1.765 m) 98% 25.56 kg/m2       Blood Pressure from Last 3 Encounters:   11/30/17 116/70   10/24/17 142/90   10/17/17 118/64    Weight from Last 3 Encounters:   11/30/17 175 lb 9.6 oz (79.7 kg)   10/24/17 176 lb 8 oz (80.1 kg)   10/17/17 176 lb 8 oz (80.1 kg)              We Performed the Following     Hepatitis C antibody     PSA, screen        Primary Care Provider Office Phone # Fax #    Riccardo Levy [phone]       [address] [facility] [address]        Equal Access to Services     [facility] : Hadii javed shetty hadasho Sokandiali, waaxda luqadaha, qaybta kaalmada adeegyada, waxay alessandrain haygayatri philippe . So [facility] [phone].    ATENCIÓN: Si habla español, tiene a hood disposición servicios gratuitos de asistencia lingüística. Gerry al [phone].    We comply with applicable federal civil rights laws and [place] laws. We do not discriminate on the basis of race, color, national origin, age, disability, sex, sexual orientation, or gender identity.            Thank you!     Thank you for choosing [facility]  for your care. Our goal is always to provide you with excellent care. Hearing back from our patients is one way we can continue to improve our services. Please take a few minutes to complete the written survey that you may receive in the mail after your visit with us. Thank you!             Your Updated Medication List - Protect others around you: Learn how to safely use, store and throw away your medicines at www.disposemymeds.org.          This list is accurate as of: 11/30/17 10:44 AM.  Always use your most recent med list.                   Brand Name Dispense Instructions for use Diagnosis    ibuprofen 200 MG capsule      " 1-3 CAPS EVERY 6 HOURS AS NEEDED for pain/fever    Fall from ladder       TYLENOL 325 MG tablet   Generic drug:  acetaminophen      1-2 TABS EVERY 4 HOURS AS NEEDED    Fall from ladder

## 2017-12-01 LAB — HCV AB SERPL QL IA: NONREACTIVE

## 2018-03-19 ENCOUNTER — OFFICE VISIT (OUTPATIENT)
Dept: FAMILY MEDICINE | Facility: OTHER | Age: 68
End: 2018-03-19
Payer: COMMERCIAL

## 2018-03-19 VITALS
SYSTOLIC BLOOD PRESSURE: 120 MMHG | DIASTOLIC BLOOD PRESSURE: 78 MMHG | TEMPERATURE: 98.6 F | RESPIRATION RATE: 16 BRPM | HEART RATE: 80 BPM | OXYGEN SATURATION: 99 % | HEIGHT: 70 IN | BODY MASS INDEX: 25.4 KG/M2 | WEIGHT: 177.4 LBS

## 2018-03-19 DIAGNOSIS — R05.9 COUGH: ICD-10-CM

## 2018-03-19 DIAGNOSIS — J32.9 SINUSITIS, UNSPECIFIED CHRONICITY, UNSPECIFIED LOCATION: Primary | ICD-10-CM

## 2018-03-19 DIAGNOSIS — J98.01 ACUTE BRONCHOSPASM: ICD-10-CM

## 2018-03-19 PROCEDURE — 99213 OFFICE O/P EST LOW 20 MIN: CPT | Performed by: PHYSICIAN ASSISTANT

## 2018-03-19 RX ORDER — AMOXICILLIN 875 MG
875 TABLET ORAL 2 TIMES DAILY
Qty: 20 TABLET | Refills: 0 | Status: SHIPPED | OUTPATIENT
Start: 2018-03-19 | End: 2019-10-18

## 2018-03-19 RX ORDER — ALBUTEROL SULFATE 90 UG/1
2 AEROSOL, METERED RESPIRATORY (INHALATION) EVERY 6 HOURS PRN
Qty: 1 INHALER | Refills: 0 | Status: SHIPPED | OUTPATIENT
Start: 2018-03-19 | End: 2020-09-16

## 2018-03-19 ASSESSMENT — PAIN SCALES - GENERAL: PAINLEVEL: NO PAIN (0)

## 2018-03-19 NOTE — PROGRESS NOTES
"  SUBJECTIVE:   Duane W Dahlstrom is a 67 year old male who presents to clinic today for the following health issues:      Acute Illness   Acute illness concerns: cold symptoms  Onset: 1 month    Fever: no    Chills/Sweats: no    Headache (location?): YES    Sinus Pressure:YES    Conjunctivitis:  no    Ear Pain: no    Rhinorrhea: YES    Congestion: YES    Sore Throat: no     Cough: YES-productive of clear sputum, productive of green sputum    Wheeze: YES    Decreased Appetite: no    Nausea: no    Vomiting: no    Diarrhea:  no    Dysuria/Freq.: no    Fatigue/Achiness: no    Sick/Strep Exposure: no     Therapies Tried and outcome: Vicks, Nyquil, Dayquil; slight relief    Patient has had a cough and cold symptoms for over a month. He denies fevers, has had some production with cough, has had some wheezing and has had sinus pressure and pain. The cough seems to come in fits and keeps him up at night.   -------------------------------------    Problem list and histories reviewed & adjusted, as indicated.  Additional history: as documented    BP Readings from Last 3 Encounters:   03/19/18 120/78   11/30/17 116/70   10/24/17 142/90    Wt Readings from Last 3 Encounters:   03/19/18 177 lb 6.4 oz (80.5 kg)   11/30/17 175 lb 9.6 oz (79.7 kg)   10/24/17 176 lb 8 oz (80.1 kg)         Reviewed and updated as needed this visit by clinical staff  Tobacco  Allergies  Med Hx  Surg Hx  Fam Hx  Soc Hx      Reviewed and updated as needed this visit by Provider       ROS:  Constitutional, HEENT, cardiovascular, pulmonary, gi and gu systems are negative, except as otherwise noted.    OBJECTIVE:     /78 (BP Location: Right arm, Patient Position: Chair, Cuff Size: Adult Large)  Pulse 80  Temp 98.6  F (37  C) (Oral)  Resp 16  Ht 5' 9.5\" (1.765 m)  Wt 177 lb 6.4 oz (80.5 kg)  SpO2 99%  BMI 25.82 kg/m2  Body mass index is 25.82 kg/(m^2).  GENERAL: alert and no distress  EYES: Eyes grossly normal to inspection, PERRL and " conjunctivae and sclerae normal  HENT: normal cephalic/atraumatic, both ears: clear effusion, rhinorrhea yellow, oropharynx clear, oral mucous membranes moist and sinuses: not tender  NECK: bilateral anterior cervical adenopathy  RESP: expiratory wheezes bilateral  CV: regular rates and rhythm  MS: no gross musculoskeletal defects noted, no edema    Diagnostic Test Results:  none     ASSESSMENT/PLAN:       ICD-10-CM    1. Sinusitis, unspecified chronicity, unspecified location J32.9 amoxicillin (AMOXIL) 875 MG tablet   2. Cough R05    3. Acute bronchospasm J98.01 albuterol (PROAIR HFA/PROVENTIL HFA/VENTOLIN HFA) 108 (90 BASE) MCG/ACT Inhaler       See Patient Instructions    My Baez PA-C  [facility]

## 2018-03-19 NOTE — NURSING NOTE
"Chief Complaint   Patient presents with     URI     1 month       Initial /78 (BP Location: Right arm, Patient Position: Chair, Cuff Size: Adult Large)  Pulse 80  Temp 98.6  F (37  C) (Oral)  Resp 16  Ht 5' 9.5\" (1.765 m)  Wt 177 lb 6.4 oz (80.5 kg)  SpO2 99%  BMI 25.82 kg/m2 Estimated body mass index is 25.82 kg/(m^2) as calculated from the following:    Height as of this encounter: 5' 9.5\" (1.765 m).    Weight as of this encounter: 177 lb 6.4 oz (80.5 kg).  Medication Reconciliation: complete     Chiara SWANSON LPN      "

## 2018-03-19 NOTE — PATIENT INSTRUCTIONS
Sinusitis (Antibiotic Treatment)    The sinuses are air-filled spaces within the bones of the face. They connect to the inside of the nose. Sinusitis is an inflammation of the tissue lining the sinus cavity. Sinus inflammation can occur during a cold. It can also be due to allergies to pollens and other particles in the air. Sinusitis can cause symptoms of sinus congestion and fullness. A sinus infection causes fever, headache and facial pain. There is often green or yellow drainage from the nose or into the back of the throat (post-nasal drip). You have been given antibiotics to treat this condition.  Home care:    Take the full course of antibiotics as instructed. Do not stop taking them, even if you feel better.    Drink plenty of water, hot tea, and other liquids. This may help thin mucus. It also may promote sinus drainage.    Heat may help soothe painful areas of the face. Use a towel soaked in hot water. Or,  the shower and direct the hot spray onto your face. Using a vaporizer along with a menthol rub at night may also help.     An expectorant containing guaifenesin may help thin the mucus and promote drainage from the sinuses.    Over-the-counter decongestants may be used unless a similar medicine was prescribed. Nasal sprays work the fastest. Use one that contains phenylephrine or oxymetazoline. First blow the nose gently. Then use the spray. Do not use these medicines more often than directed on the label or symptoms may get worse. You may also use tablets containing pseudoephedrine. Avoid products that combine ingredients, because side effects may be increased. Read labels. You can also ask the pharmacist for help. (NOTE: Persons with high blood pressure should not use decongestants. They can raise blood pressure.)    Over-the-counter antihistamines may help if allergies contributed to your sinusitis.      Do not use nasal rinses or irrigation during an acute sinus infection, unless told to by  your health care provider. Rinsing may spread the infection to other sinuses.    Use acetaminophen or ibuprofen to control pain, unless another pain medicine was prescribed. (If you have chronic liver or kidney disease or ever had a stomach ulcer, talk with your doctor before using these medicines. Aspirin should never be used in anyone under 18 years of age who is ill with a fever. It may cause severe liver damage.)    Don't smoke. This can worsen symptoms.  Follow-up care  Follow up with your healthcare provider or our staff if you are not improving within the next week.  When to seek medical advice  Call your healthcare provider if any of these occur:    Facial pain or headache becoming more severe    Stiff neck    Unusual drowsiness or confusion    Swelling of the forehead or eyelids    Vision problems, including blurred or double vision    Fever of 100.4 F (38 C) or higher, or as directed by your healthcare provider    Seizure    Breathing problems    Symptoms not resolving within 10 days  Date Last Reviewed: 4/13/2015 2000-2017 [employer]. [address] [place] [address], [place] [id number]. All rights reserved. This information is not intended as a substitute for professional medical care. Always follow your healthcare professional's instructions.

## 2018-03-19 NOTE — MR AVS SNAPSHOT
After Visit Summary   3/19/2018    Duane W Dahlstrom    MRN: [id number]           Patient Information     Date Of Birth          1950        Visit Information        Provider Department      3/19/2018 8:20 AM My Baez PA-C [facility]        Today's Diagnoses     Sinusitis, unspecified chronicity, unspecified location    -  1    Cough        Acute bronchospasm          Care Instructions      Sinusitis (Antibiotic Treatment)    The sinuses are air-filled spaces within the bones of the face. They connect to the inside of the nose. Sinusitis is an inflammation of the tissue lining the sinus cavity. Sinus inflammation can occur during a cold. It can also be due to allergies to pollens and other particles in the air. Sinusitis can cause symptoms of sinus congestion and fullness. A sinus infection causes fever, headache and facial pain. There is often green or yellow drainage from the nose or into the back of the throat (post-nasal drip). You have been given antibiotics to treat this condition.  Home care:    Take the full course of antibiotics as instructed. Do not stop taking them, even if you feel better.    Drink plenty of water, hot tea, and other liquids. This may help thin mucus. It also may promote sinus drainage.    Heat may help soothe painful areas of the face. Use a towel soaked in hot water. Or,  the shower and direct the hot spray onto your face. Using a vaporizer along with a menthol rub at night may also help.     An expectorant containing guaifenesin may help thin the mucus and promote drainage from the sinuses.    Over-the-counter decongestants may be used unless a similar medicine was prescribed. Nasal sprays work the fastest. Use one that contains phenylephrine or oxymetazoline. First blow the nose gently. Then use the spray. Do not use these medicines more often than directed on the label or symptoms may get worse. You may also use tablets containing  pseudoephedrine. Avoid products that combine ingredients, because side effects may be increased. Read labels. You can also ask the pharmacist for help. (NOTE: Persons with high blood pressure should not use decongestants. They can raise blood pressure.)    Over-the-counter antihistamines may help if allergies contributed to your sinusitis.      Do not use nasal rinses or irrigation during an acute sinus infection, unless told to by your health care provider. Rinsing may spread the infection to other sinuses.    Use acetaminophen or ibuprofen to control pain, unless another pain medicine was prescribed. (If you have chronic liver or kidney disease or ever had a stomach ulcer, talk with your doctor before using these medicines. Aspirin should never be used in anyone under 18 years of age who is ill with a fever. It may cause severe liver damage.)    Don't smoke. This can worsen symptoms.  Follow-up care  Follow up with your healthcare provider or our staff if you are not improving within the next week.  When to seek medical advice  Call your healthcare provider if any of these occur:    Facial pain or headache becoming more severe    Stiff neck    Unusual drowsiness or confusion    Swelling of the forehead or eyelids    Vision problems, including blurred or double vision    Fever of 100.4 F (38 C) or higher, or as directed by your healthcare provider    Seizure    Breathing problems    Symptoms not resolving within 10 days  Date Last Reviewed: 4/13/2015 2000-2017 [employer]. [address] [place] [address], [place] [address]. All rights reserved. This information is not intended as a substitute for professional medical care. Always follow your healthcare professional's instructions.                Follow-ups after your visit        Follow-up notes from your care team     Return if symptoms worsen or fail to improve.      Your next 10 appointments already scheduled     Jun 08, 2018  9:30 AM CDT   Return Visit  "with Lulú Fish MD   [facility]    [address] [place] [address] [phone]              Who to contact     If you have questions or need follow up information about today's clinic visit or your schedule please contact [facility] directly at [phone].  Normal or non-critical lab and imaging results will be communicated to you by MyChart, letter or phone within 4 business days after the clinic has received the results. If you do not hear from us within 7 days, please contact the clinic through [employer] or phone. If you have a critical or abnormal lab result, we will notify you by phone as soon as possible.  Submit refill requests through [employer] or call your pharmacy and they will forward the refill request to us. Please allow 3 business days for your refill to be completed.          Additional Information About Your Visit        [employer] Information     [employer] lets you send messages to your doctor, view your test results, renew your prescriptions, schedule appointments and more. To sign up, go to [email or web address] . Click on \"Log in\" on the left side of the screen, which will take you to the Welcome page. Then click on \"Sign up Now\" on the right side of the page.     You will be asked to enter the access code listed below, as well as some personal information. Please follow the directions to create your username and password.     Your access code is: [email or web address]  Expires: 2018  8:49 AM     Your access code will  in 90 days. If you need help or a new code, please call your [facility] or [phone].        Care EveryWhere ID     This is your Care EveryWhere ID. This could be used by other organizations to access your [place] medical records  [id number]        Your Vitals Were     Pulse Temperature Respirations Height Pulse Oximetry BMI (Body Mass Index)    80 98.6  F (37  C) (Oral) 16 5' 9.5\" (1.765 m) " 99% 25.82 kg/m2       Blood Pressure from Last 3 Encounters:   03/19/18 120/78   11/30/17 116/70   10/24/17 142/90    Weight from Last 3 Encounters:   03/19/18 177 lb 6.4 oz (80.5 kg)   11/30/17 175 lb 9.6 oz (79.7 kg)   10/24/17 176 lb 8 oz (80.1 kg)              Today, you had the following     No orders found for display         Today's Medication Changes          These changes are accurate as of 3/19/18  8:49 AM.  If you have any questions, ask your nurse or doctor.               Start taking these medicines.        Dose/Directions    albuterol 108 (90 BASE) MCG/ACT Inhaler   Commonly known as:  PROAIR HFA/PROVENTIL HFA/VENTOLIN HFA   Used for:  Acute bronchospasm   Started by:  My Baez PA-C        Dose:  2 puff   Inhale 2 puffs into the lungs every 6 hours as needed for shortness of breath / dyspnea or wheezing   Quantity:  1 Inhaler   Refills:  0       amoxicillin 875 MG tablet   Commonly known as:  AMOXIL   Used for:  Sinusitis, unspecified chronicity, unspecified location   Started by:  My Baez PA-C        Dose:  875 mg   Take 1 tablet (875 mg) by mouth 2 times daily   Quantity:  20 tablet   Refills:  0            Where to get your medicines      These medications were sent to [id number] - [place], [place] - [address] [place] [address]    Hours:  M-F 8:30-6:30; Sat 9-4; closed Sunday Phone:  [phone]     albuterol 108 (90 BASE) MCG/ACT Inhaler    amoxicillin 875 MG tablet                Primary Care Provider Office Phone # Fax #    Riccardo Levy [phone]       [address] [facility] [address]        Equal Access to Services     DAVIDE BALDERAS AH: Pawan Elder, wagina dewey, qaybta kaalmaeleanor campos, anyi pryor. So [facility] [phone].    ATENCIÓN: Si habla español, tiene a hood disposición servicios gratuitos de asistencia lingüística. Llame al [phone].    We comply with applicable  federal civil rights laws and [place] laws. We do not discriminate on the basis of race, color, national origin, age, disability, sex, sexual orientation, or gender identity.            Thank you!     Thank you for choosing [facility]  for your care. Our goal is always to provide you with excellent care. Hearing back from our patients is one way we can continue to improve our services. Please take a few minutes to complete the written survey that you may receive in the mail after your visit with us. Thank you!             Your Updated Medication List - Protect others around you: Learn how to safely use, store and throw away your medicines at www.disposemymeds.org.          This list is accurate as of 3/19/18  8:49 AM.  Always use your most recent med list.                   Brand Name Dispense Instructions for use Diagnosis    albuterol 108 (90 BASE) MCG/ACT Inhaler    PROAIR HFA/PROVENTIL HFA/VENTOLIN HFA    1 Inhaler    Inhale 2 puffs into the lungs every 6 hours as needed for shortness of breath / dyspnea or wheezing    Acute bronchospasm       amoxicillin 875 MG tablet    AMOXIL    20 tablet    Take 1 tablet (875 mg) by mouth 2 times daily    Sinusitis, unspecified chronicity, unspecified location       ibuprofen 200 MG capsule      1-3 CAPS EVERY 6 HOURS AS NEEDED for pain/fever    Fall from ladder       TYLENOL 325 MG tablet   Generic drug:  acetaminophen      1-2 TABS EVERY 4 HOURS AS NEEDED    Fall from ladder

## 2018-04-09 ENCOUNTER — DOCUMENTATION ONLY (OUTPATIENT)
Dept: VASCULAR SURGERY | Facility: CLINIC | Age: 68
End: 2018-04-09

## 2018-04-09 DIAGNOSIS — Z13.6 ENCOUNTER FOR ABDOMINAL AORTIC ANEURYSM (AAA) SCREENING: Primary | ICD-10-CM

## 2018-06-08 ENCOUNTER — OFFICE VISIT (OUTPATIENT)
Dept: DERMATOLOGY | Facility: CLINIC | Age: 68
End: 2018-06-08
Payer: COMMERCIAL

## 2018-06-08 DIAGNOSIS — D22.9 MULTIPLE BENIGN NEVI: ICD-10-CM

## 2018-06-08 DIAGNOSIS — L81.4 SOLAR LENTIGINOSIS: ICD-10-CM

## 2018-06-08 DIAGNOSIS — L57.0 AK (ACTINIC KERATOSIS): ICD-10-CM

## 2018-06-08 DIAGNOSIS — Z87.898 HX OF ATYPICAL NEVUS: Primary | ICD-10-CM

## 2018-06-08 DIAGNOSIS — D48.5 NEOPLASM OF UNCERTAIN BEHAVIOR OF SKIN: ICD-10-CM

## 2018-06-08 DIAGNOSIS — R23.8 PAPULE: ICD-10-CM

## 2018-06-08 PROCEDURE — 88305 TISSUE EXAM BY PATHOLOGIST: CPT | Mod: TC | Performed by: DERMATOLOGY

## 2018-06-08 PROCEDURE — 11100 HC DESTRUCT PREMALIGNANT LESION, FIRST: CPT | Mod: 59 | Performed by: DERMATOLOGY

## 2018-06-08 PROCEDURE — 99213 OFFICE O/P EST LOW 20 MIN: CPT | Mod: 25 | Performed by: DERMATOLOGY

## 2018-06-08 PROCEDURE — 17000 DESTRUCT PREMALG LESION: CPT | Performed by: DERMATOLOGY

## 2018-06-08 PROCEDURE — 88342 IMHCHEM/IMCYTCHM 1ST ANTB: CPT | Mod: TC | Performed by: DERMATOLOGY

## 2018-06-08 NOTE — NURSING NOTE
Duane W Dahlstrom's goals for this visit include:   Chief Complaint   Patient presents with     RECHECK     skin check new spot on arm        He requests these members of his care team be copied on today's visit information: yes    PCP: Riccardo Levy    Referring Provider:  No referring provider defined for this encounter.    There were no vitals taken for this visit.    Do you need any medication refills at today's visit? No     Amorrae TARA Chua

## 2018-06-08 NOTE — PATIENT INSTRUCTIONS
CRYOTHERAPY    What is it?    Use of a very cold liquid, such as liquid nitrogen, to freeze and destroy abnormal skin cells that need to be removed    What should I expect?    Tenderness and redness    A small blister that might grow and fill with dark purple blood. There may be crusting.    More than one treatment may be needed if the lesions do not go away.    How do I care for the treated area?    Gently wash the area with your hands when bathing.    Use a thin layer of Vaselin to help with healing. You may use a Band-Aid.     The area should heal within 7-10 days.    Do not use an antibiotic or Neosporin ointment.     You may take acetaminophen (Tylenol) for pain.     Call your Doctor if you have:    Severe pain    Signs of infection (warmth, redness, cloudy yellow drainage, and or a bad smell)    Questions or concerns    Contact infromation:  [facility] [phone]  [facility] [phone]      Wound Care After a Biopsy    What is a skin biopsy?  A skin biopsy allows the doctor to examine a very small piece of tissue under the microscope to determine the diagnosis and the best treatment for the skin condition. A local anesthetic (numbing medicine)  is injected with a very small needle into the skin area to be tested. A small piece of skin is taken from the area. Sometimes a suture (stitch) is used.     What are the risks of a skin biopsy?  I will experience scar, bleeding, swelling, pain, crusting and redness. I may experience incomplete removal or recurrence. Risks of this procedure are excessive bleeding, bruising, infection, nerve damage, numbness, thick (hypertrophic or keloidal) scar and non-diagnostic biopsy.    How should I care for my wound for the first 24 hours?    Keep the wound dry and covered for 24 hours    If it bleeds, hold direct pressure on the area for 15 minutes. If bleeding does not stop then go to the emergency  room    Avoid strenuous exercise the first 1-2 days or as your doctor instructs you    How should I care for the wound after 24 hours?    After 24 hours, remove the bandage    You may bathe or shower as normal    If you had a scalp biopsy, you can shampoo as usual and can use shower water to clean the biopsy site daily    Clean the wound twice a day with gentle soap and water    Do not scrub, be gentle    Apply white petroleum/Vaseline after cleaning the wound with a cotton swab or a clean finger, and keep the site covered with a Bandaid /bandage. Bandages are not necessary with a scalp biopsy    If you are unable to cover the site with a Bandaid /bandage, re-apply ointment 2-3 times a day to keep the site moist. Moisture will help with healing    Avoid strenuous activity for first 1-2 days    Avoid lakes, rivers, pools, and oceans until the stitches are removed or the site is healed    How do I clean my wound?    Wash hands thoroughly with soap or use hand  before all wound care    Clean the wound with gentle soap and water    Apply white petroleum/Vaseline  to wound after it is clean    Replace the Bandaid /bandage to keep the wound covered for the first few days or as instructed by your doctor    If you had a scalp biopsy, warm shower water to the area on a daily basis should suffice    What should I use to clean my wound?     Cotton-tipped applicators (Qtips )    White petroleum jelly (Vaseline ). Use a clean new container and use Q-tips to apply.    Bandaids   as needed    Gentle soap     How should I care for my wound long term?    Do not get your wound dirty    Keep up with wound care for one week or until the area is healed.    A small scab will form and fall off by itself when the area is completely healed. The area will be red and will become pink in color as it heals. Sun protection is very important for how your scar will turn out. Sunscreen with an SPF 30 or greater is recommended once the area  is healed.  If you have stitches, stitches need to be removed in 11-14 days. You may return to our clinic for this or you may have it done locally at your doctor s office.    You should have some soreness but it should be mild and slowly go away over several days. Talk to your doctor about using tylenol for pain,    When should I call my doctor?  If you have increased:     Pain or swelling    Pus or drainage (clear or slightly yellow drainage is ok)    Temperature over 100F    Spreading redness or warmth around wound    When will I hear about my results?  The biopsy results can take 2-3 weeks to come back. The clinic will call you with the results, send you a [employer] message, or have you schedule a follow-up clinic or phone time to discuss the results. Contact our clinics if you do not hear from us in 3 weeks.     Who should I call with questions?    [facility]: [phone]     [facility]: [phone]    For urgent needs outside of business hours call the [facility] at [phone] and ask for the dermatology resident on call

## 2018-06-08 NOTE — MR AVS SNAPSHOT
After Visit Summary   6/8/2018    Duane W Dahlstrom    MRN: [id number]           Patient Information     Date Of Birth          1950        Visit Information        Provider Department      6/8/2018 9:30 AM Lulú Fish MD [facility]        Today's Diagnoses     Hx of atypical nevus    -  1    AK (actinic keratosis)        Multiple benign nevi        Solar lentiginosis        Papule        Neoplasm of uncertain behavior of skin          Care Instructions    CRYOTHERAPY    What is it?    Use of a very cold liquid, such as liquid nitrogen, to freeze and destroy abnormal skin cells that need to be removed    What should I expect?    Tenderness and redness    A small blister that might grow and fill with dark purple blood. There may be crusting.    More than one treatment may be needed if the lesions do not go away.    How do I care for the treated area?    Gently wash the area with your hands when bathing.    Use a thin layer of Vaselin to help with healing. You may use a Band-Aid.     The area should heal within 7-10 days.    Do not use an antibiotic or Neosporin ointment.     You may take acetaminophen (Tylenol) for pain.     Call your Doctor if you have:    Severe pain    Signs of infection (warmth, redness, cloudy yellow drainage, and or a bad smell)    Questions or concerns    Contact infromation:  [facility] [phone]  [facility] [phone]      Wound Care After a Biopsy    What is a skin biopsy?  A skin biopsy allows the doctor to examine a very small piece of tissue under the microscope to determine the diagnosis and the best treatment for the skin condition. A local anesthetic (numbing medicine)  is injected with a very small needle into the skin area to be tested. A small piece of skin is taken from the area. Sometimes a suture (stitch) is used.     What are the risks of a skin biopsy?  I will  experience scar, bleeding, swelling, pain, crusting and redness. I may experience incomplete removal or recurrence. Risks of this procedure are excessive bleeding, bruising, infection, nerve damage, numbness, thick (hypertrophic or keloidal) scar and non-diagnostic biopsy.    How should I care for my wound for the first 24 hours?    Keep the wound dry and covered for 24 hours    If it bleeds, hold direct pressure on the area for 15 minutes. If bleeding does not stop then go to the emergency room    Avoid strenuous exercise the first 1-2 days or as your doctor instructs you    How should I care for the wound after 24 hours?    After 24 hours, remove the bandage    You may bathe or shower as normal    If you had a scalp biopsy, you can shampoo as usual and can use shower water to clean the biopsy site daily    Clean the wound twice a day with gentle soap and water    Do not scrub, be gentle    Apply white petroleum/Vaseline after cleaning the wound with a cotton swab or a clean finger, and keep the site covered with a Bandaid /bandage. Bandages are not necessary with a scalp biopsy    If you are unable to cover the site with a Bandaid /bandage, re-apply ointment 2-3 times a day to keep the site moist. Moisture will help with healing    Avoid strenuous activity for first 1-2 days    Avoid lakes, rivers, pools, and oceans until the stitches are removed or the site is healed    How do I clean my wound?    Wash hands thoroughly with soap or use hand  before all wound care    Clean the wound with gentle soap and water    Apply white petroleum/Vaseline  to wound after it is clean    Replace the Bandaid /bandage to keep the wound covered for the first few days or as instructed by your doctor    If you had a scalp biopsy, warm shower water to the area on a daily basis should suffice    What should I use to clean my wound?     Cotton-tipped applicators (Qtips )    White petroleum jelly (Vaseline ). Use a clean new  container and use Q-tips to apply.    Bandaids   as needed    Gentle soap     How should I care for my wound long term?    Do not get your wound dirty    Keep up with wound care for one week or until the area is healed.    A small scab will form and fall off by itself when the area is completely healed. The area will be red and will become pink in color as it heals. Sun protection is very important for how your scar will turn out. Sunscreen with an SPF 30 or greater is recommended once the area is healed.  If you have stitches, stitches need to be removed in 11-14 days. You may return to our clinic for this or you may have it done locally at your doctor s office.    You should have some soreness but it should be mild and slowly go away over several days. Talk to your doctor about using tylenol for pain,    When should I call my doctor?  If you have increased:     Pain or swelling    Pus or drainage (clear or slightly yellow drainage is ok)    Temperature over 100F    Spreading redness or warmth around wound    When will I hear about my results?  The biopsy results can take 2-3 weeks to come back. The clinic will call you with the results, send you a [employer] message, or have you schedule a follow-up clinic or phone time to discuss the results. Contact our clinics if you do not hear from us in 3 weeks.     Who should I call with questions?    [facility]: [phone]     [facility]: [phone]    For urgent needs outside of business hours call the [facility] at [phone] and ask for the dermatology resident on call              Follow-ups after your visit        Your next 10 appointments already scheduled     Jun 19, 2018  8:45 AM CDT   New Visit with Sergio Barber MD   [facility] [place] ([facility] [place])    [address] [place] [address]  [place] [id number] [phone]            Jun 20, 2018  4:00 PM CDT   [facility]  [facility]ul [facility]    [address] [facility] [address] [phone]              Who to contact     If you have questions or need follow up information about today's clinic visit or your schedule please contact [facility] directly at [phone].  Normal or non-critical lab and imaging results will be communicated to you by MyChart, letter or phone within 4 business days after the clinic has received the results. If you do not hear from us within 7 days, please contact the clinic through MyChart or phone. If you have a critical or abnormal lab result, we will notify you by phone as soon as possible.  Submit refill requests through [employer] or call your pharmacy and they will forward the refill request to us. Please allow 3 business days for your refill to be completed.          Additional Information About Your Visit        Care EveryWhere ID     This is your Care EveryWhere ID. This could be used by other organizations to access your [facility] medical records  [id number]         Blood Pressure from Last 3 Encounters:   03/19/18 120/78   11/30/17 116/70   10/24/17 142/90    Weight from Last 3 Encounters:   03/19/18 80.5 kg (177 lb 6.4 oz)   11/30/17 79.7 kg (175 lb 9.6 oz)   10/24/17 80.1 kg (176 lb 8 oz)              We Performed the Following     BIOPSY SKIN/SUBQ/MUC MEM, SINGLE LESION     Dermatological path order and indications     DESTRUCT PREMALIGNANT LESION, FIRST        Primary Care Provider Office Phone # Fax #    Riccardo Levy [phone]       [address] [facility] [address]        Equal Access to Services     DAVIDE BALDERAS : Hadii javed ku hadasho Soomaali, waaxda luqadaha, qaybta kaalmada anyi campos. So [facility] [phone].    ATENCIÓN: Si habla español, tiene a hood disposición servicios gratuitos de asistencia lingüística. Llame al [phone].    We  comply with applicable federal civil rights laws and [place] laws. We do not discriminate on the basis of race, color, national origin, age, disability, sex, sexual orientation, or gender identity.            Thank you!     Thank you for choosing [facility]  for your care. Our goal is always to provide you with excellent care. Hearing back from our patients is one way we can continue to improve our services. Please take a few minutes to complete the written survey that you may receive in the mail after your visit with us. Thank you!             Your Updated Medication List - Protect others around you: Learn how to safely use, store and throw away your medicines at www.disposemymeds.org.          This list is accurate as of 6/8/18 10:28 AM.  Always use your most recent med list.                   Brand Name Dispense Instructions for use Diagnosis    albuterol 108 (90 Base) MCG/ACT Inhaler    PROAIR HFA/PROVENTIL HFA/VENTOLIN HFA    1 Inhaler    Inhale 2 puffs into the lungs every 6 hours as needed for shortness of breath / dyspnea or wheezing    Acute bronchospasm       amoxicillin 875 MG tablet    AMOXIL    20 tablet    Take 1 tablet (875 mg) by mouth 2 times daily    Sinusitis, unspecified chronicity, unspecified location       ibuprofen 200 MG capsule      1-3 CAPS EVERY 6 HOURS AS NEEDED for pain/fever    Fall from ladder       TYLENOL 325 MG tablet   Generic drug:  acetaminophen      1-2 TABS EVERY 4 HOURS AS NEEDED    Fall from ladder

## 2018-06-08 NOTE — LETTER
6/8/2018         RE: Duane W Dahlstrom  [address]  [facility] [address]        Dear Colleague,    Thank you for referring your patient, Duane W Dahlstrom, to the [facility]. Please see a copy of my visit note below.    [facility] Dermatology Note      Dermatology Problem List:  1. Actinic Keratosis   -s/p cryotherapy  2. History of atypical/dysplastic nevi  -Junctional nevus with atypical features, right (lateral) thigh, s/p excision 12/20/2016  -Junctional nevus with moderate dysplasia, lower paraspinal back, s/p biopsy 11/14/2016  -Junctional dysplastic nevus with mild atypia, left chest, s/p biopsy 4/10/2015  -Lentiginous compound melanocytic nevus with mild atypia, upper back, s/p biopsy 4/10/2015  -Patient reports two prior atypical lesions in 1980  3. Neurofibroma, upper mid back, s/p biopsy 11/9/2015  4. Folliculitis    Encounter Date: Jun 8, 2018    CC:  Chief Complaint   Patient presents with     RECHECK     skin check new spot on arm        History of Present Illness:  Mr. Duane W Dahlstrom is a 67 year old male who presents as a follow-up for history of atypical/dysplastic nevi. The patient was last seen 6/2/2017 when the patient had cryo for AKs. Reports new spot on the arm. No other skin concerns.         Past Medical History:   Patient Active Problem List   Diagnosis     Varicose veins     CARDIOVASCULAR SCREENING; LDL GOAL LESS THAN 160     Advanced directives, counseling/discussion     Past Medical History:   Diagnosis Date     Allergic rhinitis, cause unspecified     Seasonal rhinitis     Syncope and collapse 1998     Varicose veins of leg      Past Surgical History:   Procedure Laterality Date     COLONOSCOPY  12/01/08     COLONOSCOPY N/A 11/19/2015    Procedure: COLONOSCOPY;  Surgeon: Alonso Gaitan MD;  Location: [facility]     ENDOSCOPIC LIGATION VEIN(S)       HC COLONOSCOPY THRU STOMA WITH BIOPSY  1996       Social History:  The patient worked as a   at [employer], he is now retired.     Family History:  There is no family history of skin cancer.  There is no family history of asthma, psoriasis, eczema or hay fever    Medications:  Current Outpatient Prescriptions   Medication Sig Dispense Refill     albuterol (PROAIR HFA/PROVENTIL HFA/VENTOLIN HFA) 108 (90 BASE) MCG/ACT Inhaler Inhale 2 puffs into the lungs every 6 hours as needed for shortness of breath / dyspnea or wheezing 1 Inhaler 0     amoxicillin (AMOXIL) 875 MG tablet Take 1 tablet (875 mg) by mouth 2 times daily 20 tablet 0     IBUPROFEN 200 MG OR CAPS 1-3 CAPS EVERY 6 HOURS AS NEEDED for pain/fever       TYLENOL 325 MG OR TABS 1-2 TABS EVERY 4 HOURS AS NEEDED         Allergies   Allergen Reactions     No Known Drug Allergies      Review of Systems:  -Skin: As above in HPI. No additional skin concerns.  -Const: The patient is generally feeling well today.  No recent illnesses    Physical exam:  There were no vitals taken for this visit.  GEN: This is a well developed, well-nourished male in no acute distress, in a pleasant mood.    SKIN: Total skin excluding the undergarment areas was performed. The exam included the head/face, neck, both arms, chest, back, abdomen, both legs, digits and/or nails.   -There are multiple well healed surgical scars without erythema, nodularity or telangiectasias. No re pigmentation on the left para spinal lower back.   -Multiple regular brown pigmented macules and papules are identified on the trunk and extremeties.   -Scattered brown macules on sun exposed areas.  -1.3 cm pigmented patch slight irregular shape right forearm.   -Skin colored papule on the Right 2nd DIP  - There is an erythematous macule with overyling adherent scale on the right cheek.  -No other lesions of concern on areas examined.     Impression/Plan:  1. History of atypical nevi and dysplastic nevi, no clinical evidence or recurrence    ABCD's of melanoma  were reviewed with patient and handout provided.   2. Actinic keratosis on the tip of the nose  Cryotherapy procedure note: After verbal consent and discussion of risks and benefits including but no limited to dyspigmentation/scar, blister, and pain, 1 were treated with 1-2mm freeze border for 1 cycle with liquid nitrogen. Post cryotherapy instructions were provided  3. Multiple benign Nevi, trunk and extremities    No further intervention required at this time.   4. Solar Lentigenies    Sun precaution was advised including the use of sun screens of SPF 30 or higher, sun protective clothing, and avoidance of tanning beds.  5. NUB. 1.3 cm pigmented patch slight irregular shape right forearm. Ddx includes lentigo     After discussion of benefits and risks including but not limited to bleeding, infection, scar, incomplete removal, recurrence, and non-diagnostic biopsy, written consent and photographs were obtained. The area was cleaned with isopropyl alcohol. 0.5 mL of 1% lidocaine with epinephrine was injected to obtain adequate anesthesia of the lesion on the [facility] tforearm. A 3 mm punch biopsy was performed.  4-0 Ethilon sutures were utilized to approximate the epidermal edges.  White petroleum jelly/VaselineTM and a bandage was applied to the wound.  Explicit verbal and written wound care instructions were provided.  The patient left the Dermatology Clinic in good condition.       6. Hyperkeratotic papule, right index finger- callus versus giant cell tumor of tendon sheath versus digital myoid cyst versus other    Follow up with hand surgeon for removal  7. Folliculitis- right index finger-resolved      Follow up in 1 year, earlier for new or changing lesions.     Staff Involved:  Scribe/Staff    Scribe Disclosure:   I, Glo Zhao, am serving as a scribe to document services personally performed by Dr. Lulú Fish, based on data collection and the provider's statements to me.     Provider Disclosure:   The  documentation recorded by the scribe accurately reflects the services I personally performed and the decisions made by me.    Lulú Fish MD    Department of Dermatology  [facility]: Phone: [phone], Fax:[phone]  [facility] [place]: Phone: [phone], Fax: [phone]        Again, thank you for allowing me to participate in the care of your patient.        Sincerely,        Lulú Fish MD

## 2018-06-08 NOTE — PROGRESS NOTES
[facility] Dermatology Note      Dermatology Problem List:  1. Actinic Keratosis   -s/p cryotherapy  2. History of atypical/dysplastic nevi  -Junctional nevus with atypical features, right (lateral) thigh, s/p excision 12/20/2016  -Junctional nevus with moderate dysplasia, lower paraspinal back, s/p biopsy 11/14/2016  -Junctional dysplastic nevus with mild atypia, left chest, s/p biopsy 4/10/2015  -Lentiginous compound melanocytic nevus with mild atypia, upper back, s/p biopsy 4/10/2015  -Patient reports two prior atypical lesions in 1980  3. Neurofibroma, upper mid back, s/p biopsy 11/9/2015  4. Folliculitis    Encounter Date: Jun 8, 2018    CC:  Chief Complaint   Patient presents with     RECHECK     skin check new spot on arm        History of Present Illness:  Mr. Duane W Dahlstrom is a 67 year old male who presents as a follow-up for history of atypical/dysplastic nevi. The patient was last seen 6/2/2017 when the patient had cryo for AKs. Reports new spot on the arm. No other skin concerns.         Past Medical History:   Patient Active Problem List   Diagnosis     Varicose veins     CARDIOVASCULAR SCREENING; LDL GOAL LESS THAN 160     Advanced directives, counseling/discussion     Past Medical History:   Diagnosis Date     Allergic rhinitis, cause unspecified     Seasonal rhinitis     Syncope and collapse 1998     Varicose veins of leg      Past Surgical History:   Procedure Laterality Date     COLONOSCOPY  12/01/08     COLONOSCOPY N/A 11/19/2015    Procedure: COLONOSCOPY;  Surgeon: Alonso Gaitan MD;  Location: [facility]     ENDOSCOPIC LIGATION VEIN(S)       HC COLONOSCOPY THRU STOMA WITH BIOPSY  1996       Social History:  The patient worked as a  at [employer], he is now retired.     Family History:  There is no family history of skin cancer.  There is no family history of asthma, psoriasis, eczema or hay fever    Medications:  Current  Outpatient Prescriptions   Medication Sig Dispense Refill     albuterol (PROAIR HFA/PROVENTIL HFA/VENTOLIN HFA) 108 (90 BASE) MCG/ACT Inhaler Inhale 2 puffs into the lungs every 6 hours as needed for shortness of breath / dyspnea or wheezing 1 Inhaler 0     amoxicillin (AMOXIL) 875 MG tablet Take 1 tablet (875 mg) by mouth 2 times daily 20 tablet 0     IBUPROFEN 200 MG OR CAPS 1-3 CAPS EVERY 6 HOURS AS NEEDED for pain/fever       TYLENOL 325 MG OR TABS 1-2 TABS EVERY 4 HOURS AS NEEDED         Allergies   Allergen Reactions     No Known Drug Allergies      Review of Systems:  -Skin: As above in HPI. No additional skin concerns.  -Const: The patient is generally feeling well today.  No recent illnesses    Physical exam:  There were no vitals taken for this visit.  GEN: This is a well developed, well-nourished male in no acute distress, in a pleasant mood.    SKIN: Total skin excluding the undergarment areas was performed. The exam included the head/face, neck, both arms, chest, back, abdomen, both legs, digits and/or nails.   -There are multiple well healed surgical scars without erythema, nodularity or telangiectasias. No re pigmentation on the left para spinal lower back.   -Multiple regular brown pigmented macules and papules are identified on the trunk and extremeties.   -Scattered brown macules on sun exposed areas.  -1.3 cm pigmented patch slight irregular shape right forearm.   -Skin colored papule on the Right 2nd DIP  - There is an erythematous macule with overyling adherent scale on the right cheek.  -No other lesions of concern on areas examined.     Impression/Plan:  1. History of atypical nevi and dysplastic nevi, no clinical evidence or recurrence    ABCD's of melanoma were reviewed with patient and handout provided.   2. Actinic keratosis on the tip of the nose  Cryotherapy procedure note: After verbal consent and discussion of risks and benefits including but no limited to dyspigmentation/scar,  blister, and pain, 1 were treated with 1-2mm freeze border for 1 cycle with liquid nitrogen. Post cryotherapy instructions were provided  3. Multiple benign Nevi, trunk and extremities    No further intervention required at this time.   4. Solar Lentigenies    Sun precaution was advised including the use of sun screens of SPF 30 or higher, sun protective clothing, and avoidance of tanning beds.  5. NUB. 1.3 cm pigmented patch slight irregular shape right forearm. Ddx includes lentigo     After discussion of benefits and risks including but not limited to bleeding, infection, scar, incomplete removal, recurrence, and non-diagnostic biopsy, written consent and photographs were obtained. The area was cleaned with isopropyl alcohol. 0.5 mL of 1% lidocaine with epinephrine was injected to obtain adequate anesthesia of the lesion on the [facility] tforearm. A 3 mm punch biopsy was performed.  4-0 Ethilon sutures were utilized to approximate the epidermal edges.  White petroleum jelly/VaselineTM and a bandage was applied to the wound.  Explicit verbal and written wound care instructions were provided.  The patient left the Dermatology Clinic in good condition.       6. Hyperkeratotic papule, right index finger- callus versus giant cell tumor of tendon sheath versus digital myoid cyst versus other    Follow up with hand surgeon for removal  7. Folliculitis- right index finger-resolved      Follow up in 1 year, earlier for new or changing lesions.     Staff Involved:  Scribe/Staff    Scribe Disclosure:   I, Glo Zhao, am serving as a scribe to document services personally performed by Dr. Lulú Fish, based on data collection and the provider's statements to me.     Provider Disclosure:   The documentation recorded by the scribe accurately reflects the services I personally performed and the decisions made by me.    Lulú Fish MD    Department of Dermatology  [facility]  [facility]: Phone: [phone], Fax:[phone]  [facility]: Phone: [phone], Fax: [phone]

## 2018-06-13 LAB — COPATH REPORT: NORMAL

## 2018-06-15 ENCOUNTER — TELEPHONE (OUTPATIENT)
Dept: DERMATOLOGY | Facility: CLINIC | Age: 68
End: 2018-06-15

## 2018-06-15 NOTE — TELEPHONE ENCOUNTER
Notes Recorded by Nae Burks CMA on 6/15/2018 at 11:41 AM  Called patient and informed him of his results.  I scheduled him for October with Dr. Fish.  He didn't have any additional questions.    Nae Burks CMA    ------    Notes Recorded by Lulú Fish MD on 6/15/2018 at 11:32 AM  Its a precancer  ------    Notes Recorded by Lulú Fish MD on 6/15/2018 at 11:32 AM  Recheck within 4-5 months and treat with cryo at that visit

## 2018-06-20 ENCOUNTER — OFFICE VISIT (OUTPATIENT)
Dept: UROLOGY | Facility: OTHER | Age: 68
End: 2018-06-20
Payer: COMMERCIAL

## 2018-06-20 ENCOUNTER — OFFICE VISIT (OUTPATIENT)
Dept: PODIATRY | Facility: CLINIC | Age: 68
End: 2018-06-20
Payer: COMMERCIAL

## 2018-06-20 VITALS — SYSTOLIC BLOOD PRESSURE: 143 MMHG | OXYGEN SATURATION: 98 % | DIASTOLIC BLOOD PRESSURE: 78 MMHG | HEART RATE: 68 BPM

## 2018-06-20 VITALS
WEIGHT: 179 LBS | DIASTOLIC BLOOD PRESSURE: 78 MMHG | BODY MASS INDEX: 25.62 KG/M2 | SYSTOLIC BLOOD PRESSURE: 143 MMHG | HEIGHT: 70 IN

## 2018-06-20 DIAGNOSIS — M77.9 CAPSULITIS: ICD-10-CM

## 2018-06-20 DIAGNOSIS — N48.6 PEYRONIE'S DISEASE: ICD-10-CM

## 2018-06-20 DIAGNOSIS — M77.41 METATARSALGIA OF BOTH FEET: Primary | ICD-10-CM

## 2018-06-20 DIAGNOSIS — R03.0 ELEVATED BLOOD PRESSURE READING WITHOUT DIAGNOSIS OF HYPERTENSION: ICD-10-CM

## 2018-06-20 DIAGNOSIS — N52.9 ERECTILE DYSFUNCTION, UNSPECIFIED ERECTILE DYSFUNCTION TYPE: Primary | ICD-10-CM

## 2018-06-20 DIAGNOSIS — M77.42 METATARSALGIA OF BOTH FEET: Primary | ICD-10-CM

## 2018-06-20 PROCEDURE — 99213 OFFICE O/P EST LOW 20 MIN: CPT | Performed by: PODIATRIST

## 2018-06-20 PROCEDURE — 54235 NJX CORPORA CAVERNOSA RX AGT: CPT | Performed by: UROLOGY

## 2018-06-20 PROCEDURE — 99204 OFFICE O/P NEW MOD 45 MIN: CPT | Mod: 25 | Performed by: UROLOGY

## 2018-06-20 ASSESSMENT — PAIN SCALES - GENERAL
PAINLEVEL: NO PAIN (0)
PAINLEVEL: NO PAIN (0)

## 2018-06-20 NOTE — PATIENT INSTRUCTIONS
You should receive a call from [facility] regarding your prescription. If you don't receive a call today, call them tomorrow @ [phone]. They will discuss shipping the medication to you.

## 2018-06-20 NOTE — MR AVS SNAPSHOT
"              After Visit Summary   6/20/2018    Duane W Dahlstrom    MRN: [id number]           Patient Information     Date Of Birth          1950        Visit Information        Provider Department      6/20/2018 4:00 PM Saul Gardner DPM [facility]        Today's Diagnoses     Metatarsalgia of both feet    -  1    Capsulitis          Care Instructions    Follow up with orthotist.          Follow-ups after your visit        Additional Services     ORTHOTICS REFERRAL       [facility] scheduling staff may contact patient to arrange appointments for casting of orthotics and often do not leave messages.  The patient may call this number for scheduling at their convenience. Scheduling Phone [phone].      One pair custom functional foot orthotics.   Flexible polypropylene shell with 1/8\" Spenco cushioned top cover, crepe rearfoot post, medial density arch fill, AMARIS bottom cover.  Aerobic model.  Same as last pair.  May consider adding forefoot extension sulcus length and 2-3rd met cutout.            ORTHOTICS REFERRAL       [facility] scheduling staff may contact patient to arrange appointments for casting of orthotics and often do not leave messages.  The patient may call this number for scheduling at their convenience. Scheduling Phone [phone].      Refurbish to original consider adding:  May consider adding forefoot extension sulcus length and 2-3rd met cutout.                  Your next 10 appointments already scheduled     Oct 09, 2018 10:30 AM CDT   [facility]    [id number] [place] [address] [phone]              Who to contact     If you have questions or need follow up information about today's clinic visit or your schedule please contact [facility] directly at [phone].  Normal or non-critical lab and imaging results will be communicated to you by " "MyChart, letter or phone within 4 business days after the clinic has received the results. If you do not hear from us within 7 days, please contact the clinic through Brighter Future Challengehart or phone. If you have a critical or abnormal lab result, we will notify you by phone as soon as possible.  Submit refill requests through ividence or call your pharmacy and they will forward the refill request to us. Please allow 3 business days for your refill to be completed.          Additional Information About Your Visit        Brighter Future ChallengeharApportable Information     ividence lets you send messages to your doctor, view your test results, renew your prescriptions, schedule appointments and more. To sign up, go to [email or web address] . Click on \"Log in\" on the left side of the screen, which will take you to the Welcome page. Then click on \"Sign up Now\" on the right side of the page.     You will be asked to enter the access code listed below, as well as some personal information. Please follow the directions to create your username and password.     Your access code is: [email or web address]  Expires: 2018  8:43 AM     Your access code will  in 90 days. If you need help or a new code, please call your [facility] clinic or [phone].        Care EveryWhere ID     This is your Care EveryWhere ID. This could be used by other organizations to access your [facility] medical records  [id number]        Your Vitals Were     Height BMI (Body Mass Index)                5' 9.5\" (1.765 m) 26.05 kg/m2           Blood Pressure from Last 3 Encounters:   18 143/78   18 143/78   18 120/78    Weight from Last 3 Encounters:   18 179 lb (81.2 kg)   18 177 lb 6.4 oz (80.5 kg)   17 175 lb 9.6 oz (79.7 kg)              We Performed the Following     ORTHOTICS REFERRAL     ORTHOTICS REFERRAL        Primary Care Provider Office Phone # Fax #    Riccardo Levy [phone]       [address] [facility] [address]        Equal " Access to Services     [facility]: Hadii aad ku hadashleymónica Josephali, wareggieda luqioana, qaybta kacrispinanyi santana. So [facility] [phone].    ATENCIÓN: Si habla espcarlota, tiene a hood disposición servicios gratuitos de asistencia lingüística. Llame al [phone].    We comply with applicable federal civil rights laws and [place] laws. We do not discriminate on the basis of race, color, national origin, age, disability, sex, sexual orientation, or gender identity.            Thank you!     Thank you for choosing [facility]  for your care. Our goal is always to provide you with excellent care. Hearing back from our patients is one way we can continue to improve our services. Please take a few minutes to complete the written survey that you may receive in the mail after your visit with us. Thank you!             Your Updated Medication List - Protect others around you: Learn how to safely use, store and throw away your medicines at www.disposemymeds.org.          This list is accurate as of 6/20/18  5:36 PM.  Always use your most recent med list.                   Brand Name Dispense Instructions for use Diagnosis    albuterol 108 (90 Base) MCG/ACT Inhaler    PROAIR HFA/PROVENTIL HFA/VENTOLIN HFA    1 Inhaler    Inhale 2 puffs into the lungs every 6 hours as needed for shortness of breath / dyspnea or wheezing    Acute bronchospasm       amoxicillin 875 MG tablet    AMOXIL    20 tablet    Take 1 tablet (875 mg) by mouth 2 times daily    Sinusitis, unspecified chronicity, unspecified location       ibuprofen 200 MG capsule      1-3 CAPS EVERY 6 HOURS AS NEEDED for pain/fever    Fall from ladder       TYLENOL 325 MG tablet   Generic drug:  acetaminophen      1-2 TABS EVERY 4 HOURS AS NEEDED    Fall from ladder

## 2018-06-20 NOTE — MR AVS SNAPSHOT
After Visit Summary   6/20/2018    Duane W Dahlstrom    MRN: [id number]           Patient Information     Date Of Birth          1950        Visit Information        Provider Department      6/20/2018 9:00 AM Sergio Barber MD [facility]        Today's Diagnoses     ED (erectile dysfunction)    -  1      Care Instructions    You should receive a call from [facility] regarding your prescription. If you don't receive a call today, call them tomorrow @ [phone]. They will discuss shipping the medication to you.           Follow-ups after your visit        Your next 10 appointments already scheduled     Jun 20, 2018  4:00 PM CDT   Return Visit with Saul Gardner DPM   [facility]    [address] [facility] [address] [phone]            Oct 09, 2018 10:30 AM CDT   Return Visit with Lulú Fish MD   [facility]    [address] [place] [address] [phone]              Who to contact     If you have questions or need follow up information about today's clinic visit or your schedule please contact [facility] directly at [phone].  Normal or non-critical lab and imaging results will be communicated to you by Bliipshart, letter or phone within 4 business days after the clinic has received the results. If you do not hear from us within 7 days, please contact the clinic through Bliipshart or phone. If you have a critical or abnormal lab result, we will notify you by phone as soon as possible.  Submit refill requests through BucketFeet or call your pharmacy and they will forward the refill request to us. Please allow 3 business days for your refill to be completed.          Additional Information About Your Visit        [email or web address] Information     BucketFeet lets you send messages to your doctor, view your test results, renew your  "prescriptions, schedule appointments and more. To sign up, go to [email or web address] . Click on \"Log in\" on the left side of the screen, which will take you to the Welcome page. Then click on \"Sign up Now\" on the right side of the page.     You will be asked to enter the access code listed below, as well as some personal information. Please follow the directions to create your username and password.     Your access code is: [email or web address]  Expires: 2018  8:43 AM     Your access code will  in 90 days. If you need help or a new code, please call your [facility] clinic or [phone].        Care EveryWhere ID     This is your Care EveryWhere ID. This could be used by other organizations to access your [facility] medical records  [id number]        Your Vitals Were     Pulse Pulse Oximetry                68 98%           Blood Pressure from Last 3 Encounters:   18 143/78   18 120/78   17 116/70    Weight from Last 3 Encounters:   18 80.5 kg (177 lb 6.4 oz)   17 79.7 kg (175 lb 9.6 oz)   10/24/17 80.1 kg (176 lb 8 oz)              We Performed the Following     ALPROSTADIL 1.25 MCG INJ []        Primary Care Provider Office Phone # Fax #    Riccardo Levy [phone]       [address] [facility] [address]        Equal Access to Services     [facility] : Hadii aad ku hadasho Soomaali, waaxda luqadaha, qaybta kaalmada adeegyada, anyi pryor. So [facility] [phone].    ATENCIÓN: Si habla español, tiene a hood disposición servicios gratuitos de asistencia lingüística. Llame al [phone].    We comply with applicable federal civil rights laws and [place] laws. We do not discriminate on the basis of race, color, national origin, age, disability, sex, sexual orientation, or gender identity.            Thank you!     Thank you for choosing [facility]  for your care. Our goal is always to provide you with excellent " care. Hearing back from our patients is one way we can continue to improve our services. Please take a few minutes to complete the written survey that you may receive in the mail after your visit with us. Thank you!             Your Updated Medication List - Protect others around you: Learn how to safely use, store and throw away your medicines at www.disposemymeds.org.          This list is accurate as of 6/20/18  9:22 AM.  Always use your most recent med list.                   Brand Name Dispense Instructions for use Diagnosis    albuterol 108 (90 Base) MCG/ACT Inhaler    PROAIR HFA/PROVENTIL HFA/VENTOLIN HFA    1 Inhaler    Inhale 2 puffs into the lungs every 6 hours as needed for shortness of breath / dyspnea or wheezing    Acute bronchospasm       amoxicillin 875 MG tablet    AMOXIL    20 tablet    Take 1 tablet (875 mg) by mouth 2 times daily    Sinusitis, unspecified chronicity, unspecified location       ibuprofen 200 MG capsule      1-3 CAPS EVERY 6 HOURS AS NEEDED for pain/fever    Fall from ladder       TYLENOL 325 MG tablet   Generic drug:  acetaminophen      1-2 TABS EVERY 4 HOURS AS NEEDED    Fall from ladder

## 2018-06-20 NOTE — PROGRESS NOTES
Chief Complaint   Patient presents with     Right Foot - RECHECK     Right capsulitis, B/L metatarsalgia; LOV 10/5/2016     Left Foot - RECHECK     B/L metatarsalgia; LOV 10/5/2016     RECHECK     check current orthotics; LOV 10/5/2016     BMI is normal.    HPI:  Duane W Dahlstrom is a 65 year old male who is seen in consultation at the request of Dr. Riccardo Levy.    Pt presents for evaluation of:     Chief Complaint   Patient presents with     Right Foot - RECHECK     Right capsulitis, B/L metatarsalgia; LOV 10/5/2016     Left Foot - RECHECK     B/L metatarsalgia; LOV 10/5/2016     RECHECK     check current orthotics; LOV 10/5/2016     Pain behind right 2nd MPJ and stiff, no swelling, pain, started 7/16.  Runs about 5 days 3 miles a day.      Onset:  July and 2016  Symptoms brought on by nothing notedd.    Location:  Right foot    Laterality:  Dorsal    Character:  dull ache    Progression of symptoms:  intermittent    Previous similar pain: no      Pain Level:  2/10    Previous treatments:  rest/inactivity    Previous foot or ankle injuries throughout your life that took you out of work or play for more than a few days? no      Works as a [employer].    Shoe gear: casual shoes    ROS:  10 point ROS neg other than the symptoms noted above in the HPI.    PAST MEDICAL HISTORY:   Past Medical History:   Diagnosis Date     Allergic rhinitis, cause unspecified     Seasonal rhinitis     Syncope and collapse 1998     Varicose veins of leg         PAST SURGICAL HISTORY:   Past Surgical History:   Procedure Laterality Date     COLONOSCOPY  12/01/08     COLONOSCOPY N/A 11/19/2015    Procedure: COLONOSCOPY;  Surgeon: Alonso Gaitan MD;  Location: [facility]     ENDOSCOPIC LIGATION VEIN(S)       HC COLONOSCOPY THRU STOMA WITH BIOPSY  1996        MEDICATIONS:   Current Outpatient Prescriptions:      albuterol (PROAIR HFA/PROVENTIL HFA/VENTOLIN HFA) 108 (90 BASE) MCG/ACT Inhaler, Inhale 2 puffs  "into the lungs every 6 hours as needed for shortness of breath / dyspnea or wheezing, Disp: 1 Inhaler, Rfl: 0     amoxicillin (AMOXIL) 875 MG tablet, Take 1 tablet (875 mg) by mouth 2 times daily, Disp: 20 tablet, Rfl: 0     IBUPROFEN 200 MG OR CAPS, 1-3 CAPS EVERY 6 HOURS AS NEEDED for pain/fever, Disp: , Rfl:      TYLENOL 325 MG OR TABS, 1-2 TABS EVERY 4 HOURS AS NEEDED, Disp: , Rfl:      ALLERGIES:    Allergies   Allergen Reactions     No Known Drug Allergies         SOCIAL HISTORY:   Social History     Social History     Marital status:      Spouse name: Violetta     Number of children: 2     Years of education: 16     Occupational History      [employer]     Social History Main Topics     Smoking status: Never Smoker     Smokeless tobacco: Never Used     Alcohol use Yes      Comment: social      Drug use: No     Sexual activity: Yes     Partners: Female     Other Topics Concern      Service No     Blood Transfusions No     Caffeine Concern No     Occupational Exposure No     Hobby Hazards No     Sleep Concern No     Stress Concern No     Weight Concern No     Special Diet No     Back Care No     Exercise Yes     walk     Seat Belt Yes     Social History Narrative        FAMILY HISTORY:   Family History   Problem Relation Age of Onset     Cancer - colorectal Father      Prostate Cancer Father      HEART DISEASE Father      by-pass surgery in 1993        EXAM:Vitals: /78  Ht 5' 9.5\" (1.765 m)  Wt 179 lb (81.2 kg)  BMI 26.05 kg/m2  BMI= Body mass index is 26.05 kg/(m^2).    General appearance: Patient is alert and fully cooperative with history & exam.  No sign of distress is noted during the visit.     Psychiatric: Affect is pleasant & appropriate.  Patient appears motivated to improve health.     Respiratory: Breathing is regular & unlabored while sitting.     HEENT: Hearing is intact to spoken word.  Speech is clear.  No gross evidence of visual impairment that would impact " ambulation.     Vascular: DP & PT pulses are intact & regular bilaterally.  No significant edema or varicosities noted.  CFT and skin temperature is normal to both lower extremities.     Neurologic: Lower extremity sensation is intact to light touch.  No evidence of weakness or contracture in the lower extremities.  No evidence of neuropathy.    Dermatologic: Skin is intact to both lower extremities without significant lesions, rash or abrasion.  No paronychia or evidence of soft tissue infection is noted.     Musculoskeletal: Patient is ambulatory without assistive device or brace.  Mild valgus foot type is noted. There is limited hallux dorsiflexion at the first MPJ right much greater than left. All symptoms of mild discomfort without edema are noted about the second third metatarsal phalangeal joint mostly at the dorsal joint line not along the shaft. Minimal discomfort about the plantar plate.     ASSESSMENT:       ICD-10-CM    1. Metatarsalgia of both feet M77.41 ORTHOTICS REFERRAL    M77.42 ORTHOTICS REFERRAL   2. Capsulitis M77.9 ORTHOTICS REFERRAL     ORTHOTICS REFERRAL        PLAN:  Reviewed patient's chart in [facility].      10/5/2016   Patient has developed capsulitis metatarsalgia right second third MPJ  Recommended new shoes, written instructions dispensed to provide more cushion about the forefoot more stiffness to the shank and more arch height  Order for custom molded orthotics to provide more arch height, stiffness and cushion about forefoot. Continue activities as tolerated  If this does not resolve then consider nonsteroidal anti-inflammatories and follow up for injection  Follow-up in 4-6 weeks with any continued concerns otherwise as needed    6/20/2018  Order for 2nd pair and refurbish  Follow up as needed.       Saul Gardner DPM

## 2018-06-20 NOTE — NURSING NOTE
The following medication was given:     MEDICATION:Edex  ROUTE: Intracavernous  SITE: Penis  DOSE: 40 mcg  LOT #: [id number]  :  [employer]  EXPIRATION DATE:  11/2019  NDC#: 66222-206-52    Estrella Bloom RN

## 2018-06-20 NOTE — PROGRESS NOTES
S: The patient has had a problem with erectile dysfunction for at least 6 years. It is a gradual process. He has tried both Cialis, Viagra, and Levitra in the past. The Viagra worked for a short period of time then stopped working. Cialis did not do anything at all. He has pretty good sex drive. He denied any significant social stressors.   He has tried penile injection previously but never tried it at home. He has very minimal erections.  He wants to try injection again.    Current Outpatient Prescriptions   Medication Sig Dispense Refill     albuterol (PROAIR HFA/PROVENTIL HFA/VENTOLIN HFA) 108 (90 BASE) MCG/ACT Inhaler Inhale 2 puffs into the lungs every 6 hours as needed for shortness of breath / dyspnea or wheezing 1 Inhaler 0     IBUPROFEN 200 MG OR CAPS 1-3 CAPS EVERY 6 HOURS AS NEEDED for pain/fever       TYLENOL 325 MG OR TABS 1-2 TABS EVERY 4 HOURS AS NEEDED       amoxicillin (AMOXIL) 875 MG tablet Take 1 tablet (875 mg) by mouth 2 times daily (Patient not taking: Reported on 6/20/2018) 20 tablet 0     Allergies   Allergen Reactions     No Known Drug Allergies      Past Medical History:   Diagnosis Date     Allergic rhinitis, cause unspecified     Seasonal rhinitis     Syncope and collapse 1998     Varicose veins of leg      Past Surgical History:   Procedure Laterality Date     COLONOSCOPY  12/01/08     COLONOSCOPY N/A 11/19/2015    Procedure: COLONOSCOPY;  Surgeon: Alonso Gaitan MD;  Location: [facility]     ENDOSCOPIC LIGATION VEIN(S)       HC COLONOSCOPY THRU STOMA WITH BIOPSY  1996      Family History   Problem Relation Age of Onset     Cancer - colorectal Father      Prostate Cancer Father      HEART DISEASE Father      by-pass surgery in 1993     Social History     Social History     Marital status:      Spouse name: Violetta     Number of children: 2     Years of education: 16     Occupational History      [employer]     Social History Main Topics     Smoking status: Never Smoker      Smokeless tobacco: Never Used     Alcohol use Yes      Comment: social      Drug use: No     Sexual activity: Yes     Partners: Female     Other Topics Concern      Service No     Blood Transfusions No     Caffeine Concern No     Occupational Exposure No     Hobby Hazards No     Sleep Concern No     Stress Concern No     Weight Concern No     Special Diet No     Back Care No     Exercise Yes     walk     Seat Belt Yes     Social History Narrative       REVIEW OF SYSTEMS  =================  C: NEGATIVE for fever, chills, change in weight  I: NEGATIVE for worrisome rashes, moles or lesions  E/M: NEGATIVE for ear, mouth and throat problems  R: NEGATIVE for significant cough or SHORTNESS OF BREATH  CV:  NEGATIVE for chest pain, palpitations or peripheral edema  GI: NEGATIVE for nausea, abdominal pain, heartburn, or change in bowel habits  NEURO: NEGATIVE numbness/weakness  : see HPI  PSYCH: NEGATIVE depression/anxiety  LYmph: no new enlarged lymph nodes  Ortho: no new trauma/movements      Physical Exam:  /78 (BP Location: Right arm, Patient Position: Sitting, Cuff Size: Adult Regular)  Pulse 68  SpO2 98%   Patient is pleasant, in no acute distress, good general condition.  HEENT:  Normalcephalic, atraumatic  Lung: no evidence of respiratory distress    Abdomen: Soft, nondistended, non tender. No masses. No rebound or guarding.   Exam: penis no d/c. Testis no masses.  No scrotal skin lesion.    Skin: Warm and dry.  No redness.  Neuro: grossly normal  Psych normal mood and affect  Musculoskeletal  moving all extremities    Assessment/Plan:   (476.74) Male impotence  (primary encounter diagnosis)  Comment: discussed tx options: vacuum pump/meds/injection/MUSE/surgery.  Plan: trial of injection today           40 mcg of alprostadil injected with good response            There is curvature of penis with injection (pyronie's disease).  Script prescribed.  Priapism discussed.     Peyronies: prn.   Discussed findings with patient today.    Elevated bp: Patient to follow up with Primary Care provider regarding elevated blood pressure.

## 2018-06-20 NOTE — MR AVS SNAPSHOT
"              After Visit Summary   6/20/2018    Duane W Dahlstrom    MRN: [id number]           Patient Information     Date Of Birth          1950        Visit Information        Provider Department      6/20/2018 4:00 PM Saul Gardner DPM [facility]        Today's Diagnoses     Metatarsalgia of both feet    -  1    Capsulitis          Care Instructions    Follow up with orthotist.          Follow-ups after your visit        Additional Services     ORTHOTICS REFERRAL       [facility] scheduling staff may contact patient to arrange appointments for casting of orthotics and often do not leave messages.  The patient may call this number for scheduling at their convenience. Scheduling Phone [phone].      One pair custom functional foot orthotics.   Flexible polypropylene shell with 1/8\" Spenco cushioned top cover, crepe rearfoot post, medial density arch fill, AMARIS bottom cover.  Aerobic model.  Same as last pair.  May consider adding forefoot extension sulcus length and 2-3rd met cutout.            ORTHOTICS REFERRAL       [facility] scheduling staff may contact patient to arrange appointments for casting of orthotics and often do not leave messages.  The patient may call this number for scheduling at their convenience. Scheduling Phone [phone].      Refurbish to original consider adding:  May consider adding forefoot extension sulcus length and 2-3rd met cutout.                  Your next 10 appointments already scheduled     Oct 09, 2018 10:30 AM CDT   Return Visit with Lulú Fish MD   [facility] ([facility])    [id number] [place] [id number] [phone]              Who to contact     If you have questions or need follow up information about today's clinic visit or your schedule please contact [facility] directly at [phone].  Normal or non-critical lab and imaging results will be communicated to you by " "MyChart, letter or phone within 4 business days after the clinic has received the results. If you do not hear from us within 7 days, please contact the clinic through Imsyshart or phone. If you have a critical or abnormal lab result, we will notify you by phone as soon as possible.  Submit refill requests through TaxiPixi or call your pharmacy and they will forward the refill request to us. Please allow 3 business days for your refill to be completed.          Additional Information About Your Visit        ImsysharLemko Information     TaxiPixi lets you send messages to your doctor, view your test results, renew your prescriptions, schedule appointments and more. To sign up, go to [email or web address] . Click on \"Log in\" on the left side of the screen, which will take you to the Welcome page. Then click on \"Sign up Now\" on the right side of the page.     You will be asked to enter the access code listed below, as well as some personal information. Please follow the directions to create your username and password.     Your access code is: [email or web address]  Expires: 2018  8:43 AM     Your access code will  in 90 days. If you need help or a new code, please call your [facility] clinic or [phone].        Care EveryWhere ID     This is your Care EveryWhere ID. This could be used by other organizations to access your [facility] medical records  [id number]        Your Vitals Were     Height BMI (Body Mass Index)                5' 9.5\" (1.765 m) 26.05 kg/m2           Blood Pressure from Last 3 Encounters:   18 143/78   18 143/78   18 120/78    Weight from Last 3 Encounters:   18 179 lb (81.2 kg)   18 177 lb 6.4 oz (80.5 kg)   17 175 lb 9.6 oz (79.7 kg)              We Performed the Following     ORTHOTICS REFERRAL     ORTHOTICS REFERRAL        Primary Care Provider Office Phone # Fax #    Riccardo Levy [phone]       [address] [facility] [address]        Equal " Access to Services     [facility]: Hadii aad ku hadashleymónica Josephali, wareggieda luqioana, qaybta kacrispinanyi santana. So [facility] [phone].    ATENCIÓN: Si habla espcarlota, tiene a hood disposición servicios gratuitos de asistencia lingüística. Llame al [phone].    We comply with applicable federal civil rights laws and [place] laws. We do not discriminate on the basis of race, color, national origin, age, disability, sex, sexual orientation, or gender identity.            Thank you!     Thank you for choosing [facility]  for your care. Our goal is always to provide you with excellent care. Hearing back from our patients is one way we can continue to improve our services. Please take a few minutes to complete the written survey that you may receive in the mail after your visit with us. Thank you!             Your Updated Medication List - Protect others around you: Learn how to safely use, store and throw away your medicines at www.disposemymeds.org.          This list is accurate as of 6/20/18  5:36 PM.  Always use your most recent med list.                   Brand Name Dispense Instructions for use Diagnosis    albuterol 108 (90 Base) MCG/ACT Inhaler    PROAIR HFA/PROVENTIL HFA/VENTOLIN HFA    1 Inhaler    Inhale 2 puffs into the lungs every 6 hours as needed for shortness of breath / dyspnea or wheezing    Acute bronchospasm       amoxicillin 875 MG tablet    AMOXIL    20 tablet    Take 1 tablet (875 mg) by mouth 2 times daily    Sinusitis, unspecified chronicity, unspecified location       ibuprofen 200 MG capsule      1-3 CAPS EVERY 6 HOURS AS NEEDED for pain/fever    Fall from ladder       TYLENOL 325 MG tablet   Generic drug:  acetaminophen      1-2 TABS EVERY 4 HOURS AS NEEDED    Fall from ladder

## 2018-06-26 ENCOUNTER — ALLIED HEALTH/NURSE VISIT (OUTPATIENT)
Dept: FAMILY MEDICINE | Facility: OTHER | Age: 68
End: 2018-06-26
Payer: COMMERCIAL

## 2018-06-26 DIAGNOSIS — Z23 NEED FOR VACCINATION: Primary | ICD-10-CM

## 2018-06-26 PROCEDURE — 90750 HZV VACC RECOMBINANT IM: CPT

## 2018-06-26 PROCEDURE — 90471 IMMUNIZATION ADMIN: CPT

## 2018-06-26 NOTE — MR AVS SNAPSHOT
"              After Visit Summary   6/26/2018    Duane W Dahlstrom    MRN: [id number]           Patient Information     Date Of Birth          1950        Visit Information        Provider Department      6/26/2018 2:15 PM JOSETTE STINSON [facility]        Today's Diagnoses     Need for vaccination    -  1       Follow-ups after your visit        Your next 10 appointments already scheduled     Oct 09, 2018 10:30 AM CDT   Return Visit with Lulú Fish MD   [facility] ([facility]    [address] [place] [address] [phone]              Who to contact     If you have questions or need follow up information about today's clinic visit or your schedule please contact [facility] directly at [phone].  Normal or non-critical lab and imaging results will be communicated to you by MyChart, letter or phone within 4 business days after the clinic has received the results. If you do not hear from us within 7 days, please contact the clinic through MyChart or phone. If you have a critical or abnormal lab result, we will notify you by phone as soon as possible.  Submit refill requests through Webcollage or call your pharmacy and they will forward the refill request to us. Please allow 3 business days for your refill to be completed.          Additional Information About Your Visit        MyChart Information     Webcollage lets you send messages to your doctor, view your test results, renew your prescriptions, schedule appointments and more. To sign up, go to [email or web address] . Click on \"Log in\" on the left side of the screen, which will take you to the Welcome page. Then click on \"Sign up Now\" on the right side of the page.     You will be asked to enter the access code listed below, as well as some personal information. Please follow the directions to create your username and password.     Your access code is: [email or web address]  Expires: " 2018  8:43 AM     Your access code will  in 90 days. If you need help or a new code, please call your [place] clinic or [phone].        Care EveryWhere ID     This is your Care EveryWhere ID. This could be used by other organizations to access your [place] medical records  [id number]         Blood Pressure from Last 3 Encounters:   18 143/78   18 143/78   18 120/78    Weight from Last 3 Encounters:   18 179 lb (81.2 kg)   18 177 lb 6.4 oz (80.5 kg)   17 175 lb 9.6 oz (79.7 kg)              We Performed the Following     1st  Administration  [[id number]]     SHINGRIX [[id number]]        Primary Care Provider Office Phone # Fax #    Riccardo Levy [phone]       [address] [facility] [address]        Equal Access to Services     DAVIDE BALDERAS : Hadii aad ku hadasho Soomaali, waaxda luqadaha, qaybta kaalmada adeegyada, waxay alessandrain haybartn shaina philippe . So [facility] [phone].    ATENCIÓN: Si habla español, tiene a hood disposición servicios gratuitos de asistencia lingüística. Llame al [phone].    We comply with applicable federal civil rights laws and [place] laws. We do not discriminate on the basis of race, color, national origin, age, disability, sex, sexual orientation, or gender identity.            Thank you!     Thank you for choosing [facility]  for your care. Our goal is always to provide you with excellent care. Hearing back from our patients is one way we can continue to improve our services. Please take a few minutes to complete the written survey that you may receive in the mail after your visit with us. Thank you!             Your Updated Medication List - Protect others around you: Learn how to safely use, store and throw away your medicines at www.disposemymeds.org.          This list is accurate as of 18  3:09 PM.  Always use your most recent med list.                   Brand Name Dispense Instructions for  use Diagnosis    albuterol 108 (90 Base) MCG/ACT Inhaler    PROAIR HFA/PROVENTIL HFA/VENTOLIN HFA    1 Inhaler    Inhale 2 puffs into the lungs every 6 hours as needed for shortness of breath / dyspnea or wheezing    Acute bronchospasm       amoxicillin 875 MG tablet    AMOXIL    20 tablet    Take 1 tablet (875 mg) by mouth 2 times daily    Sinusitis, unspecified chronicity, unspecified location       ibuprofen 200 MG capsule      1-3 CAPS EVERY 6 HOURS AS NEEDED for pain/fever    Fall from ladder       TYLENOL 325 MG tablet   Generic drug:  acetaminophen      1-2 TABS EVERY 4 HOURS AS NEEDED    Fall from ladder

## 2018-06-26 NOTE — NURSING NOTE
Screening Questionnaire for Adult Immunization    Are you sick today?   No   Do you have allergies to medications, food, a vaccine component or latex?   No   Have you ever had a serious reaction after receiving a vaccination?   No   Do you have a long-term health problem with heart disease, lung disease, asthma, kidney disease, metabolic disease (e.g. diabetes), anemia, or other blood disorder?   No   Do you have cancer, leukemia, HIV/AIDS, or any other immune system problem?   No   In the past 3 months, have you taken medications that affect  your immune system, such as prednisone, other steroids, or anticancer drugs; drugs for the treatment of rheumatoid arthritis, Crohn s disease, or psoriasis; or have you had radiation treatments?   No   Have you had a seizure, or a brain or other nervous system problem?   No   During the past year, have you received a transfusion of blood or blood     products, or been given immune (gamma) globulin or antiviral drug?   No   For women: Are you pregnant or is there a chance you could become        pregnant during the next month?   No   Have you received any vaccinations in the past 4 weeks?   No     Immunization questionnaire answers were all negative.        Prior to injection verified patient identity using patient's name and date of birth.  Due to injection administration, patient instructed to remain in clinic for 15 minutes  afterwards, and to report any adverse reaction to me immediately.       Screening performed by Farzana Lopez on 6/26/2018 at 3:07 PM.

## 2018-10-09 ENCOUNTER — OFFICE VISIT (OUTPATIENT)
Dept: DERMATOLOGY | Facility: CLINIC | Age: 68
End: 2018-10-09
Payer: COMMERCIAL

## 2018-10-09 DIAGNOSIS — L90.5 SCAR: Primary | ICD-10-CM

## 2018-10-09 DIAGNOSIS — L81.4 LENTIGO: ICD-10-CM

## 2018-10-09 PROCEDURE — 99212 OFFICE O/P EST SF 10 MIN: CPT | Performed by: DERMATOLOGY

## 2018-10-09 NOTE — MR AVS SNAPSHOT
After Visit Summary   10/9/2018    Duane W Dahlstrom    MRN: [id number]           Patient Information     Date Of Birth          1950        Visit Information        Provider Department      10/9/2018 10:30 AM Lulú Fish MD [facility]        Today's Diagnoses     Scar    -  1    Lentigo           Follow-ups after your visit        Who to contact     If you have questions or need follow up information about today's clinic visit or your schedule please contact [facility] directly at [phone].  Normal or non-critical lab and imaging results will be communicated to you by Gocietyhart, letter or phone within 4 business days after the clinic has received the results. If you do not hear from us within 7 days, please contact the clinic through Gocietyhart or phone. If you have a critical or abnormal lab result, we will notify you by phone as soon as possible.  Submit refill requests through [employer] or call your pharmacy and they will forward the refill request to us. Please allow 3 business days for your refill to be completed.          Additional Information About Your Visit        MyChart Information     [employer] is an electronic gateway that provides easy, online access to your medical records. With [employer], you can request a clinic appointment, read your test results, renew a prescription or communicate with your care team.     To sign up for [employer] visit the website at [email or web address]   You will be asked to enter the access code listed below, as well as some personal information. Please follow the directions to create your username and password.     Your access code is: [email or web address]  Expires: 2019 11:09 AM     Your access code will  in 90 days. If you need help or a new code, please contact your [facility] or call [phone] for assistance.        Care EveryWhere ID     This is your Care EveryWhere ID. This  could be used by other organizations to access your [place] medical records  [id number]         Blood Pressure from Last 3 Encounters:   06/20/18 143/78   06/20/18 143/78   03/19/18 120/78    Weight from Last 3 Encounters:   06/20/18 81.2 kg (179 lb)   03/19/18 80.5 kg (177 lb 6.4 oz)   11/30/17 79.7 kg (175 lb 9.6 oz)              Today, you had the following     No orders found for display       Primary Care Provider Office Phone # Fax #    Riccardo Levy [phone]       [address] [facility] [address]        Equal Access to Services     INDY BALDERAS : Hadii javed Elder, roz dewey, negro floresmaeleanor campos, anyi pryor. So [facility] [phone].    ATENCIÓN: Si habla español, tiene a hood disposición servicios gratuitos de asistencia lingüística. Llame al [phone].    We comply with applicable federal civil rights laws and [place] laws. We do not discriminate on the basis of race, color, national origin, age, disability, sex, sexual orientation, or gender identity.            Thank you!     Thank you for choosing [facility]  for your care. Our goal is always to provide you with excellent care. Hearing back from our patients is one way we can continue to improve our services. Please take a few minutes to complete the written survey that you may receive in the mail after your visit with us. Thank you!             Your Updated Medication List - Protect others around you: Learn how to safely use, store and throw away your medicines at www.disposemymeds.org.          This list is accurate as of 10/9/18 11:09 AM.  Always use your most recent med list.                   Brand Name Dispense Instructions for use Diagnosis    albuterol 108 (90 Base) MCG/ACT inhaler    PROAIR HFA/PROVENTIL HFA/VENTOLIN HFA    1 Inhaler    Inhale 2 puffs into the lungs every 6 hours as needed for shortness of breath / dyspnea or wheezing    Acute  bronchospasm       amoxicillin 875 MG tablet    AMOXIL    20 tablet    Take 1 tablet (875 mg) by mouth 2 times daily    Sinusitis, unspecified chronicity, unspecified location       ibuprofen 200 MG capsule      1-3 CAPS EVERY 6 HOURS AS NEEDED for pain/fever    Fall from ladder       TYLENOL 325 MG tablet   Generic drug:  acetaminophen      1-2 TABS EVERY 4 HOURS AS NEEDED    Fall from ladder

## 2018-10-09 NOTE — PROGRESS NOTES
[facility] Dermatology Note      Dermatology Problem List:  1. Actinic Keratosis   -s/p cryotherapy  2. History of atypical/dysplastic nevi  -Junctional nevus with atypical features, right (lateral) thigh, s/p excision 12/20/2016  -Junctional nevus with moderate dysplasia, lower paraspinal back, s/p biopsy 11/14/2016  -Junctional dysplastic nevus with mild atypia, left chest, s/p biopsy 4/10/2015  -Lentiginous compound melanocytic nevus with mild atypia, upper back, s/p biopsy 4/10/2015  -Patient reports two prior atypical lesions in 1980  3. Neurofibroma, upper mid back, s/p biopsy 11/9/2015  4. Folliculitis    Encounter Date: Oct 9, 2018    CC:  Chief Complaint   Patient presents with     RECHECK     right forearm        History of Present Illness:  Mr. Duane W Dahlstrom is a 67 year old male who presents as a follow-up for an AK on the right forearm. This lesion was biopsied at his last visit on 6/8/18. He says today that this area has resolved.Wants brown spot on left neck checked. No other concerns.          Past Medical History:   Patient Active Problem List   Diagnosis     Varicose veins     CARDIOVASCULAR SCREENING; LDL GOAL LESS THAN 160     Advanced directives, counseling/discussion     Erectile dysfunction, unspecified erectile dysfunction type     Peyronie's disease     Past Medical History:   Diagnosis Date     Allergic rhinitis, cause unspecified     Seasonal rhinitis     Syncope and collapse 1998     Varicose veins of leg      Past Surgical History:   Procedure Laterality Date     COLONOSCOPY  12/01/08     COLONOSCOPY N/A 11/19/2015    Procedure: COLONOSCOPY;  Surgeon: Alonso Gaitan MD;  Location: [facility]     ENDOSCOPIC LIGATION VEIN(S)       HC COLONOSCOPY THRU STOMA WITH BIOPSY  1996       Social History:  The patient worked as a  at [email or web address], he is now retired.   Kept in chart for convenience.       Family History:  There is no  family history of skin cancer.  There is no family history of asthma, psoriasis, eczema or hay fever  Kept in chart for convenience.       Medications:  Current Outpatient Prescriptions   Medication Sig Dispense Refill     albuterol (PROAIR HFA/PROVENTIL HFA/VENTOLIN HFA) 108 (90 BASE) MCG/ACT Inhaler Inhale 2 puffs into the lungs every 6 hours as needed for shortness of breath / dyspnea or wheezing 1 Inhaler 0     amoxicillin (AMOXIL) 875 MG tablet Take 1 tablet (875 mg) by mouth 2 times daily 20 tablet 0     IBUPROFEN 200 MG OR CAPS 1-3 CAPS EVERY 6 HOURS AS NEEDED for pain/fever       TYLENOL 325 MG OR TABS 1-2 TABS EVERY 4 HOURS AS NEEDED         Allergies   Allergen Reactions     No Known Drug Allergies      Review of Systems:  -Skin: As above in HPI. No additional skin concerns.    Physical exam:  There were no vitals taken for this visit.  GEN: This is a well developed, well-nourished male in no acute distress, in a pleasant mood.    SKIN: Focused exam of the right forearm and left neck was performed.  - Right forearm is clear, well healed biopsy scar  -tan regular homogenous macule, left neck  -No other lesions of concern on areas examined.     Impression/Plan:  1. History of atypical nevi and dysplastic nevi.     2. Actinic keratosis, right forearm s/p bx 6/8/18 - resolved. Scar today    No further intervention needed.     3. LEntigo, left neck, no evidence of malignancy  Follow up in 1 year, earlier for new or changing lesions.     Staff Involved:  Scribe/Staff    Scribe Disclosure  I, Salvador Chisholm, am serving as a scribe to document services personally performed by Dr. Lulú Fish MD, based on data collection and the provider's statements to me.     Provider Disclosure:   The documentation recorded by the scribe accurately reflects the services I personally performed and the decisions made by me.    Lulú Fish MD    Department of Dermatology  [facility]  [facility]: Phone: [phone], Fax:[phone]  [facility]: Phone: [phone], Fax: [phone]

## 2018-10-09 NOTE — NURSING NOTE
Duane W Dahlstrom's goals for this visit include:   Chief Complaint   Patient presents with     RECHECK     right forearm        He requests these members of his care team be copied on today's visit information: yes     PCP: Riccardo Levy    Referring Provider:  No referring provider defined for this encounter.    There were no vitals taken for this visit.    Do you need any medication refills at today's visit? No     Amorrae TARA Chua

## 2018-10-09 NOTE — LETTER
10/9/2018         RE: Duane W Dahlstrom  [address]  [place] [address]        Dear Colleague,    Thank you for referring your patient, Duane W Dahlstrom, to the [facility]. Please see a copy of my visit note below.    [facility] Dermatology Note      Dermatology Problem List:  1. Actinic Keratosis   -s/p cryotherapy  2. History of atypical/dysplastic nevi  -Junctional nevus with atypical features, right (lateral) thigh, s/p excision 12/20/2016  -Junctional nevus with moderate dysplasia, lower paraspinal back, s/p biopsy 11/14/2016  -Junctional dysplastic nevus with mild atypia, left chest, s/p biopsy 4/10/2015  -Lentiginous compound melanocytic nevus with mild atypia, upper back, s/p biopsy 4/10/2015  -Patient reports two prior atypical lesions in 1980  3. Neurofibroma, upper mid back, s/p biopsy 11/9/2015  4. Folliculitis    Encounter Date: Oct 9, 2018    CC:  Chief Complaint   Patient presents with     RECHECK     right forearm        History of Present Illness:  Mr. Duane W Dahlstrom is a 67 year old male who presents as a follow-up for an AK on the right forearm. This lesion was biopsied at his last visit on 6/8/18. He says today that this area has resolved.Wants brown spot on left neck checked. No other concerns.          Past Medical History:   Patient Active Problem List   Diagnosis     Varicose veins     CARDIOVASCULAR SCREENING; LDL GOAL LESS THAN 160     Advanced directives, counseling/discussion     Erectile dysfunction, unspecified erectile dysfunction type     Peyronie's disease     Past Medical History:   Diagnosis Date     Allergic rhinitis, cause unspecified     Seasonal rhinitis     Syncope and collapse 1998     Varicose veins of leg      Past Surgical History:   Procedure Laterality Date     COLONOSCOPY  12/01/08     COLONOSCOPY N/A 11/19/2015    Procedure: COLONOSCOPY;  Surgeon: Alonso Gaitan MD;  Location: [facility]     ENDOSCOPIC LIGATION  VEIN(S)       HC COLONOSCOPY THRU STOMA WITH BIOPSY  1996       Social History:  The patient worked as a  at [employer] and [employer], he is now retired.   Kept in chart for convenience.       Family History:  There is no family history of skin cancer.  There is no family history of asthma, psoriasis, eczema or hay fever  Kept in chart for convenience.       Medications:  Current Outpatient Prescriptions   Medication Sig Dispense Refill     albuterol (PROAIR HFA/PROVENTIL HFA/VENTOLIN HFA) 108 (90 BASE) MCG/ACT Inhaler Inhale 2 puffs into the lungs every 6 hours as needed for shortness of breath / dyspnea or wheezing 1 Inhaler 0     amoxicillin (AMOXIL) 875 MG tablet Take 1 tablet (875 mg) by mouth 2 times daily 20 tablet 0     IBUPROFEN 200 MG OR CAPS 1-3 CAPS EVERY 6 HOURS AS NEEDED for pain/fever       TYLENOL 325 MG OR TABS 1-2 TABS EVERY 4 HOURS AS NEEDED         Allergies   Allergen Reactions     No Known Drug Allergies      Review of Systems:  -Skin: As above in HPI. No additional skin concerns.    Physical exam:  There were no vitals taken for this visit.  GEN: This is a well developed, well-nourished male in no acute distress, in a pleasant mood.    SKIN: Focused exam of the right forearm and left neck was performed.  - Right forearm is clear, well healed biopsy scar  -tan regular homogenous macule, left neck  -No other lesions of concern on areas examined.     Impression/Plan:  1. History of atypical nevi and dysplastic nevi.     2. Actinic keratosis, right forearm s/p bx 6/8/18 - resolved. Scar today    No further intervention needed.     3. LEntigo, left neck, no evidence of malignancy  Follow up in 1 year, earlier for new or changing lesions.     Staff Involved:  Scribe/Staff    Scribe Disclosure  I, Salvador Chisholm, am serving as a scribe to document services personally performed by Dr. Lulú Fish MD, based on data collection and the provider's statements to me.     Provider  Disclosure:   The documentation recorded by the scribe accurately reflects the services I personally performed and the decisions made by me.    Lulú Fish MD    Department of Dermatology  [facility]: Phone: [phone], Fax:[phone]  [facility] [place]: Phone: [phone], Fax: [phone]          Again, thank you for allowing me to participate in the care of your patient.        Sincerely,        Lulú Fish MD

## 2019-10-10 ENCOUNTER — OFFICE VISIT (OUTPATIENT)
Dept: DERMATOLOGY | Facility: CLINIC | Age: 69
End: 2019-10-10
Payer: COMMERCIAL

## 2019-10-10 DIAGNOSIS — L81.4 SOLAR LENTIGINOSIS: ICD-10-CM

## 2019-10-10 DIAGNOSIS — L57.0 AK (ACTINIC KERATOSIS): ICD-10-CM

## 2019-10-10 DIAGNOSIS — D48.5 NEOPLASM OF UNCERTAIN BEHAVIOR OF SKIN: Primary | ICD-10-CM

## 2019-10-10 DIAGNOSIS — L81.6 POIKILODERMA: ICD-10-CM

## 2019-10-10 DIAGNOSIS — D22.9 MULTIPLE BENIGN NEVI: ICD-10-CM

## 2019-10-10 PROCEDURE — 11102 TANGNTL BX SKIN SINGLE LES: CPT | Performed by: DERMATOLOGY

## 2019-10-10 PROCEDURE — 11103 TANGNTL BX SKIN EA SEP/ADDL: CPT | Performed by: DERMATOLOGY

## 2019-10-10 PROCEDURE — 88341 IMHCHEM/IMCYTCHM EA ADD ANTB: CPT | Mod: TC | Performed by: DERMATOLOGY

## 2019-10-10 PROCEDURE — 17000 DESTRUCT PREMALG LESION: CPT | Mod: 59 | Performed by: DERMATOLOGY

## 2019-10-10 PROCEDURE — 88342 IMHCHEM/IMCYTCHM 1ST ANTB: CPT | Mod: TC | Performed by: DERMATOLOGY

## 2019-10-10 PROCEDURE — 99213 OFFICE O/P EST LOW 20 MIN: CPT | Mod: 25 | Performed by: DERMATOLOGY

## 2019-10-10 PROCEDURE — 88305 TISSUE EXAM BY PATHOLOGIST: CPT | Mod: TC | Performed by: DERMATOLOGY

## 2019-10-10 NOTE — PATIENT INSTRUCTIONS

## 2019-10-10 NOTE — LETTER
10/10/2019         RE: Duane W Dahlstrom  [address]  [place] [address]        Dear Colleague,    Thank you for referring your patient, Duane W Dahlstrom, to the [facility]. Please see a copy of my visit note below.    [facility] Dermatology Note    Dermatology Problem List:  1. Actinic Keratosis   - s/p cryotherapy  2. History of atypical/dysplastic nevi  - Junctional nevus with atypical features, right (lateral) thigh, s/p excision 12/20/16  - Junctional nevus with moderate dysplasia, lower paraspinal back, s/p biopsy 11/14/16  - Junctional dysplastic nevus with mild atypia, left chest, s/p biopsy 4/10/15  - Lentiginous compound melanocytic nevus with mild atypia, upper back, s/p biopsy 4/10/15  - Patient reports two prior atypical lesions in 1980  3. Neurofibroma, upper mid back, s/p biopsy 11/9/15  4. Folliculitis  5. NUB, right medial chest and central posterior neck  - pending biopsy results 10/10/19     Encounter Date: Oct 10, 2019    CC:  Chief Complaint   Patient presents with     Derm Problem     1 year skin check        History of Present Illness:  Mr. Duane W Dahlstrom is a 68 year old male with history of atypical nevi who presents today for a skin exam. The patient was last seen by Dr. Fish on 10/9/18 when no evidence for malignant lesions was found. Today he does not have any new lesions of concern. Denies bleeding, growing, or painful lesions. Denies changing moles. When asked about a lesion on his chest he cannot recall when it first appeared. The patient is otherwise feeling well. There are no other skin concerns at this time.      Past Medical History:   Patient Active Problem List   Diagnosis     Varicose veins     CARDIOVASCULAR SCREENING; LDL GOAL LESS THAN 160     Advanced directives, counseling/discussion     Erectile dysfunction, unspecified erectile dysfunction type     Peyronie's disease     Past Medical History:   Diagnosis Date      Allergic rhinitis, cause unspecified     Seasonal rhinitis     Syncope and collapse 1998     Varicose veins of leg      Past Surgical History:   Procedure Laterality Date     COLONOSCOPY  12/01/08     COLONOSCOPY N/A 11/19/2015    Procedure: COLONOSCOPY;  Surgeon: Alonso Gaitan MD;  Location: [facility]     ENDOSCOPIC LIGATION VEIN(S)       HC COLONOSCOPY THRU STOMA WITH BIOPSY  1996       Social History:  Patient reports that he has never smoked. He has never used smokeless tobacco. He reports current alcohol use. He reports that he does not use drugs.    Family History:  Family History   Problem Relation Age of Onset     Cancer - colorectal Father      Prostate Cancer Father      Heart Disease Father         by-pass surgery in 1993       Medications:  Current Outpatient Medications   Medication Sig Dispense Refill     albuterol (PROAIR HFA/PROVENTIL HFA/VENTOLIN HFA) 108 (90 BASE) MCG/ACT Inhaler Inhale 2 puffs into the lungs every 6 hours as needed for shortness of breath / dyspnea or wheezing 1 Inhaler 0     IBUPROFEN 200 MG OR CAPS 1-3 CAPS EVERY 6 HOURS AS NEEDED for pain/fever       TYLENOL 325 MG OR TABS 1-2 TABS EVERY 4 HOURS AS NEEDED       amoxicillin (AMOXIL) 875 MG tablet Take 1 tablet (875 mg) by mouth 2 times daily (Patient not taking: Reported on 10/10/2019) 20 tablet 0       Allergies   Allergen Reactions     No Known Drug Allergies        Review of Systems:  -Skin Establ Pt: The patient denies any new rash, pruritus, or lesions that are symptomatic, changing or bleeding, except as per HPI.  -Constitutional: The patient is feeling generally well.    Physical exam:  Vitals: There were no vitals taken for this visit.  GEN: This is a well developed, well-nourished male in no acute distress, in a pleasant mood.    SKIN: Total skin excluding the undergarment areas was performed. The exam included the head/face, neck, both arms, chest, back, abdomen, both legs, digits and/or nails.   - No erythema,  telangectasias, nodularity, or pigmentation on the sites of prior atypical nevi.  - Scattered brown macules on sun exposed areas.  - Multiple regular brown pigmented macules and papules are identified on the trunk and extremities.    - Erythematous macule with overyling adherent scale on the left helix.  - Well demarcated pink white papule with tel on the right medial chest.   - Well demarcated homogenous dark brown macule on the central posterior neck.   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   - No other lesions of concern on areas examined.       Impression/Plan:  1. History of atypical nevi, no clinical evidence of recurrence     Continue periodic skin exams and report of new or changing lesions.    Recommend sunscreens SPF #30 or greater, protective clothing and avoidance of tanning beds.    2. Neoplasm of uncertain behavior - right medial chest. DDx includes BCC vs folliculitis vs healing skin vs scar.    After discussion of benefits and risks including but not limited to bleeding, infection, scar, incomplete removal, recurrence, and non-diagnostic biopsy, written consent and photographs were obtained. The area was cleaned with isopropyl alcohol. 0.75 mL of 1% lidocaine with epinephrine was injected to obtain adequate anesthesia of the lesion on the right medial chest. A shave biopsy was performed. Hemostasis was achieved with aluminium chloride. Vaseline and a sterile dressing were applied. The patient tolerated the procedure and no complications were noted. The patient was provided with verbal and written post care instructions.     3. Neoplasm of uncertain behavior - central posterior neck. DDx includes MIS vs lentigo vs SK.    After discussion of benefits and risks including but not limited to bleeding, infection, scar, incomplete removal, recurrence, and non-diagnostic biopsy, written consent and photographs were obtained. The area was cleaned with isopropyl alcohol. 0.75 mL of 1% lidocaine with  epinephrine was injected to obtain adequate anesthesia of the lesion on the central posterior neck. A shave biopsy was performed. Hemostasis was achieved with aluminium chloride. Vaseline and a sterile dressing were applied. The patient tolerated the procedure and no complications were noted. The patient was provided with verbal and written post care instructions.     4. Actinic keratosis - left helix   Cryotherapy procedure note: After verbal consent and discussion of risks and benefits including but no limited to dyspigmentation/scar, blister, and pain, 1 was treated with 1-2mm freeze border for 2 cycles with liquid nitrogen. Post cryotherapy instructions were provided.     5. Benign findings: clinically benign nevi, solar lentigines, poikiloderma   No further intervention required. Patient to report changes.   Patient reassured of the benign nature of these lesions.    Follow-up in 1 year, earlier for new or changing lesions.       Staff Involved:  Staff Physician Comments:   I saw and evaluated the patient with the resident (Dr. Shraddha Garcia) and I agree with the assessment and plan. I was present for the key portions of the above major procedure and examination..    Riki Conrad DO    Department of Dermatology  [facility]: Phone: [phone], Fax:[phone]  [facility]: Phone: [phone], Fax: [phone]                    Again, thank you for allowing me to participate in the care of your patient.        Sincerely,        Riki Conrad MD

## 2019-10-10 NOTE — PROGRESS NOTES
[facility] Dermatology Note    Dermatology Problem List:  1. Actinic Keratosis   - s/p cryotherapy  2. History of atypical/dysplastic nevi  - Junctional nevus with atypical features, right (lateral) thigh, s/p excision 12/20/16  - Junctional nevus with moderate dysplasia, lower paraspinal back, s/p biopsy 11/14/16  - Junctional dysplastic nevus with mild atypia, left chest, s/p biopsy 4/10/15  - Lentiginous compound melanocytic nevus with mild atypia, upper back, s/p biopsy 4/10/15  - Patient reports two prior atypical lesions in 1980  3. Neurofibroma, upper mid back, s/p biopsy 11/9/15  4. Folliculitis  5. NUB, right medial chest and central posterior neck  - pending biopsy results 10/10/19     Encounter Date: Oct 10, 2019    CC:  Chief Complaint   Patient presents with     Derm Problem     1 year skin check        History of Present Illness:  Mr. Duane W Dahlstrom is a 68 year old male with history of atypical nevi who presents today for a skin exam. The patient was last seen by Dr. Fish on 10/9/18 when no evidence for malignant lesions was found. Today he does not have any new lesions of concern. Denies bleeding, growing, or painful lesions. Denies changing moles. When asked about a lesion on his chest he cannot recall when it first appeared. The patient is otherwise feeling well. There are no other skin concerns at this time.      Past Medical History:   Patient Active Problem List   Diagnosis     Varicose veins     CARDIOVASCULAR SCREENING; LDL GOAL LESS THAN 160     Advanced directives, counseling/discussion     Erectile dysfunction, unspecified erectile dysfunction type     Peyronie's disease     Past Medical History:   Diagnosis Date     Allergic rhinitis, cause unspecified     Seasonal rhinitis     Syncope and collapse 1998     Varicose veins of leg      Past Surgical History:   Procedure Laterality Date     COLONOSCOPY  12/01/08     COLONOSCOPY N/A 11/19/2015    Procedure: COLONOSCOPY;   Surgeon: Alonso Gaitan MD;  Location: [facility]     ENDOSCOPIC LIGATION VEIN(S)       HC COLONOSCOPY THRU STOMA WITH BIOPSY  1996       Social History:  Patient reports that he has never smoked. He has never used smokeless tobacco. He reports current alcohol use. He reports that he does not use drugs.    Family History:  Family History   Problem Relation Age of Onset     Cancer - colorectal Father      Prostate Cancer Father      Heart Disease Father         by-pass surgery in 1993       Medications:  Current Outpatient Medications   Medication Sig Dispense Refill     albuterol (PROAIR HFA/PROVENTIL HFA/VENTOLIN HFA) 108 (90 BASE) MCG/ACT Inhaler Inhale 2 puffs into the lungs every 6 hours as needed for shortness of breath / dyspnea or wheezing 1 Inhaler 0     IBUPROFEN 200 MG OR CAPS 1-3 CAPS EVERY 6 HOURS AS NEEDED for pain/fever       TYLENOL 325 MG OR TABS 1-2 TABS EVERY 4 HOURS AS NEEDED       amoxicillin (AMOXIL) 875 MG tablet Take 1 tablet (875 mg) by mouth 2 times daily (Patient not taking: Reported on 10/10/2019) 20 tablet 0       Allergies   Allergen Reactions     No Known Drug Allergies        Review of Systems:  -Skin Establ Pt: The patient denies any new rash, pruritus, or lesions that are symptomatic, changing or bleeding, except as per HPI.  -Constitutional: The patient is feeling generally well.    Physical exam:  Vitals: There were no vitals taken for this visit.  GEN: This is a well developed, well-nourished male in no acute distress, in a pleasant mood.    SKIN: Total skin excluding the undergarment areas was performed. The exam included the head/face, neck, both arms, chest, back, abdomen, both legs, digits and/or nails.   - No erythema, telangectasias, nodularity, or pigmentation on the sites of prior atypical nevi.  - Scattered brown macules on sun exposed areas.  - Multiple regular brown pigmented macules and papules are identified on the trunk and extremities.    - Erythematous macule with  overyling adherent scale on the left helix.  - Well demarcated pink white papule with tel on the right medial chest.   - Well demarcated homogenous dark brown macule on the central posterior neck.   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   - No other lesions of concern on areas examined.       Impression/Plan:  1. History of atypical nevi, no clinical evidence of recurrence     Continue periodic skin exams and report of new or changing lesions.    Recommend sunscreens SPF #30 or greater, protective clothing and avoidance of tanning beds.    2. Neoplasm of uncertain behavior - right medial chest. DDx includes BCC vs folliculitis vs healing skin vs scar.    After discussion of benefits and risks including but not limited to bleeding, infection, scar, incomplete removal, recurrence, and non-diagnostic biopsy, written consent and photographs were obtained. The area was cleaned with isopropyl alcohol. 0.75 mL of 1% lidocaine with epinephrine was injected to obtain adequate anesthesia of the lesion on the right medial chest. A shave biopsy was performed. Hemostasis was achieved with aluminium chloride. Vaseline and a sterile dressing were applied. The patient tolerated the procedure and no complications were noted. The patient was provided with verbal and written post care instructions.     3. Neoplasm of uncertain behavior - central posterior neck. DDx includes MIS vs lentigo vs SK.    After discussion of benefits and risks including but not limited to bleeding, infection, scar, incomplete removal, recurrence, and non-diagnostic biopsy, written consent and photographs were obtained. The area was cleaned with isopropyl alcohol. 0.75 mL of 1% lidocaine with epinephrine was injected to obtain adequate anesthesia of the lesion on the central posterior neck. A shave biopsy was performed. Hemostasis was achieved with aluminium chloride. Vaseline and a sterile dressing were applied. The patient tolerated the procedure  and no complications were noted. The patient was provided with verbal and written post care instructions.     4. Actinic keratosis - left helix   Cryotherapy procedure note: After verbal consent and discussion of risks and benefits including but no limited to dyspigmentation/scar, blister, and pain, 1 was treated with 1-2mm freeze border for 2 cycles with liquid nitrogen. Post cryotherapy instructions were provided.     5. Benign findings: clinically benign nevi, solar lentigines, poikiloderma   No further intervention required. Patient to report changes.   Patient reassured of the benign nature of these lesions.    Follow-up in 1 year, earlier for new or changing lesions.       Staff Involved:  Staff Physician Comments:   I saw and evaluated the patient with the resident (Dr. Shraddha Garcia) and I agree with the assessment and plan. I was present for the key portions of the above major procedure and examination..    Riki Conrad DO    Department of Dermatology  [facility]: Phone: [phone], Fax:[phone]  [facility]: Phone: [phone], Fax: [phone]

## 2019-10-10 NOTE — NURSING NOTE
Duane W Dahlstrom's goals for this visit include:   Chief Complaint   Patient presents with     Derm Problem     1 year skin check        He requests these members of his care team be copied on today's visit information: yes    PCP: Riccardo Levy    Referring Provider:  No referring provider defined for this encounter.    There were no vitals taken for this visit.    Do you need any medication refills at today's visit? No     Amorrsharee Chua CMA

## 2019-10-16 LAB — COPATH REPORT: NORMAL

## 2019-10-18 ENCOUNTER — TELEPHONE (OUTPATIENT)
Dept: DERMATOLOGY | Facility: CLINIC | Age: 69
End: 2019-10-18

## 2019-12-02 ENCOUNTER — OFFICE VISIT (OUTPATIENT)
Dept: DERMATOLOGY | Facility: CLINIC | Age: 69
End: 2019-12-02
Payer: COMMERCIAL

## 2019-12-02 VITALS — SYSTOLIC BLOOD PRESSURE: 134 MMHG | DIASTOLIC BLOOD PRESSURE: 78 MMHG | HEART RATE: 67 BPM

## 2019-12-02 DIAGNOSIS — D22.4 ATYPICAL NEVUS OF NECK: Primary | ICD-10-CM

## 2019-12-02 PROCEDURE — 11422 EXC H-F-NK-SP B9+MARG 1.1-2: CPT | Performed by: DERMATOLOGY

## 2019-12-02 PROCEDURE — 88342 IMHCHEM/IMCYTCHM 1ST ANTB: CPT | Mod: TC | Performed by: DERMATOLOGY

## 2019-12-02 PROCEDURE — 88305 TISSUE EXAM BY PATHOLOGIST: CPT | Mod: TC | Performed by: DERMATOLOGY

## 2019-12-02 PROCEDURE — 88341 IMHCHEM/IMCYTCHM EA ADD ANTB: CPT | Mod: TC | Performed by: DERMATOLOGY

## 2019-12-02 PROCEDURE — 12042 INTMD RPR N-HF/GENIT2.6-7.5: CPT | Performed by: DERMATOLOGY

## 2019-12-02 RX ORDER — MUPIROCIN 20 MG/G
OINTMENT TOPICAL ONCE
Status: COMPLETED | OUTPATIENT
Start: 2019-12-02 | End: 2019-12-02

## 2019-12-02 RX ADMIN — MUPIROCIN: 20 OINTMENT TOPICAL at 09:30

## 2019-12-02 ASSESSMENT — PAIN SCALES - GENERAL: PAINLEVEL: NO PAIN (0)

## 2019-12-02 NOTE — NURSING NOTE
Mupirocin, Vaseline and pressure dressing applied to excision site on central posterior neck.  Wound care instructions reviewed with patient and AVS provided.  Patient verbalized understanding. No further questions or concerns at this time.      The following medication was given:     MEDICATION:  Lidocaine with epinephrine 1% [id number]  ROUTE: SQ  SITE: see procedure note  DOSE: 6cc  LOT #: -EV  : Hosp[employer]  EXPIRATION DATE: 1-7-2020  NDC#: 1443-4850-06   Was there drug waste? no  Multi-dose vial: Yes    Lidya Woody LPN  December 2, 2019

## 2019-12-02 NOTE — PATIENT INSTRUCTIONS

## 2019-12-02 NOTE — NURSING NOTE
Duane W Dahlstrom's goals for this visit include:   Chief Complaint   Patient presents with     Procedure     excision atypical mole        He requests these members of his care team be copied on today's visit information: yes    PCP: Riccardo Levy    Referring Provider:  No referring provider defined for this encounter.    There were no vitals taken for this visit.    Do you need any medication refills at today's visit? No     Amorrae TARA Chua

## 2019-12-02 NOTE — LETTER
12/2/2019         RE: Duane W Dahlstrom  [address]  [facility] [address]        Dear Colleague,    Thank you for referring your patient, Duane W Dahlstrom, to the [facility]. Please see a copy of my visit note below.    DERMATOLOGY EXCISION PROCEDURE NOTE    [facility]   [address], [place] [address]     NAME OF PROCEDURE: Excision intermediate layered linear closure  Staff surgeon: Riki Conrad DO  Scrub Nurse: Lidya Woody LPN    PRE-OPERATIVE DIAGNOSIS: Lentiginous compound melanocytic nevus with moderate to severe atypia   POST-OPERATIVE DIAGNOSIS: same   FINAL EXCISION SIZE(DEFECT SIZE): 1.6 x 1.4 cm, with 5 mm margin   FINAL REPAIR LENGTH: 4.2 cm     INDICATIONS: This patient presented with a 0.6 x 0.4 cm moderately to severely atypical nevus of the central posterior neck. Excision was indicated. We discussed the principles of treatment and most likely complications including scarring, bleeding, infection, incomplete excision, wound dehiscence, pain, nerve damage, and recurrence. Informed consent was obtained and the patient underwent the procedure as follows:    PROCEDURE: The patient was taken to the operative suite. Time-out was performed.  The treatment area was anesthetized with 1% lidocaine and epinephrine (1:100,000). The area was prepped with Chlorhexidine and rinsed with sterile saline and draped with sterile towels. The lesion was delineated and excised down to subcutaneous fat in a elliptical manner. Hemostasis was obtained by electrocoagulation.     REPAIR: An intermediate layered linear closure was selected as the procedure which would maximally preserve both function and cosmesis.    After the excision of the tumor, the area was carefully undermined. Hemostasis was obtained with electrocoagulation.  Closure was oriented so that the wound was in the patient's natural skin tension lines. The subcutaneous and dermal  layers were then closed with 5-0 Monocryl sutures. The epidermis was then carefully approximated along the length of the wound using 5-0 fast absorbing gut simple running sutures.     The final wound length was 4.2 cm. A total of 6.0 ml of anesthesia was administered for all surgical sites. Estimated blood loss was less than 10 ml for all surgical sites. A sterile pressure dressing was applied and wound care instructions, with a written handout, were given. The [facility] and [facility]ulatory.    Follow-up as needed for wound evaluation.       Anatomic Pathology Results: pending      Staff Involved:    Scribe Disclosure  I, Benjamin Street, am serving as a scribe to document services personally performed by Dr. Riki Conrad DO, based on data collection and the provider's statements to me.     Provider Disclosure:   The documentation recorded by the scribe accurately reflects the services I personally performed and the decisions made by me.  I personally performed the procedures today.    Riki Conrad DO    Department of Dermatology  [facility]: Phone: [phone], Fax:[phone]  [facility]: Phone: [phone], Fax: [phone]                            Again, thank you for allowing me to participate in the care of your patient.        Sincerely,        Riki Conrad MD

## 2019-12-02 NOTE — NURSING NOTE
Central posterior neck:    Pre-op size: 0.6X0.4  5mm margins  6cc lido with epi  5-0 mono and 5-0 FAG  Intermediate closure  Final Length: 4.2cm      Lidya Woody LPN

## 2019-12-02 NOTE — PROGRESS NOTES
DERMATOLOGY EXCISION PROCEDURE NOTE    [facility]   [address], [place] [address]     NAME OF PROCEDURE: Excision intermediate layered linear closure  Staff surgeon: Riki Conrad DO  Scrub Nurse: Lidya Woody LPN    PRE-OPERATIVE DIAGNOSIS: Lentiginous compound melanocytic nevus with moderate to severe atypia   POST-OPERATIVE DIAGNOSIS: same   FINAL EXCISION SIZE(DEFECT SIZE): 1.6 x 1.4 cm, with 5 mm margin   FINAL REPAIR LENGTH: 4.2 cm     INDICATIONS: This patient presented with a 0.6 x 0.4 cm moderately to severely atypical nevus of the central posterior neck. Excision was indicated. We discussed the principles of treatment and most likely complications including scarring, bleeding, infection, incomplete excision, wound dehiscence, pain, nerve damage, and recurrence. Informed consent was obtained and the patient underwent the procedure as follows:    PROCEDURE: The patient was taken to the operative suite. Time-out was performed.  The treatment area was anesthetized with 1% lidocaine and epinephrine (1:100,000). The area was prepped with Chlorhexidine and rinsed with sterile saline and draped with sterile towels. The lesion was delineated and excised down to subcutaneous fat in a elliptical manner. Hemostasis was obtained by electrocoagulation.     REPAIR: An intermediate layered linear closure was selected as the procedure which would maximally preserve both function and cosmesis.    After the excision of the tumor, the area was carefully undermined. Hemostasis was obtained with electrocoagulation.  Closure was oriented so that the wound was in the patient's natural skin tension lines. The subcutaneous and dermal layers were then closed with 5-0 Monocryl sutures. The epidermis was then carefully approximated along the length of the wound using 5-0 fast absorbing gut simple running sutures.     The final wound length was 4.2 cm. A total of 6.0 ml of  anesthesia was administered for all surgical sites. Estimated blood loss was less than 10 ml for all surgical sites. A sterile pressure dressing was applied and wound care instructions, with a written handout, were given. The patient was [facility] alert and ambulatory.    Follow-up as needed for wound evaluation.       Anatomic Pathology Results: pending      Staff Involved:    Scribe Disclosure  IBenjamin, am serving as a scribe to document services personally performed by Dr. Riki Conrad DO, based on data collection and the provider's statements to me.     Provider Disclosure:   The documentation recorded by the scribe accurately reflects the services I personally performed and the decisions made by me.  I personally performed the procedures today.    Riki Conrad DO    Department of Dermatology  [facility]: Phone: [phone], Fax:[phone]  [facility]: Phone: [phone], Fax: [phone]

## 2019-12-03 ENCOUNTER — TELEPHONE (OUTPATIENT)
Dept: DERMATOLOGY | Facility: CLINIC | Age: 69
End: 2019-12-03

## 2019-12-03 NOTE — TELEPHONE ENCOUNTER
Notes recorded by Shekhar Chua MA on 12/3/2019 at 7:52 AM CST  Excision completed.     Shekhar Chua CMA     ------    Notes recorded by Caridad Dorman LPN on 10/18/2019 at 10:40 AM CDT  Duane W Dahlstrom's pathology results were discussed with providers recommendations detail: Diagnosis has been explained in detail, along with a brief description of the procedure process. See encounter date 10/18/2019 for further documentation. Procedure appointment has been scheduled for 12/02/2019 @ 1:00pm. Patient has expressed understanding, and has no further questions at this time.    Caridad Dorman LPN        ------    Notes recorded by Pennie Ramires LPN on 10/18/2019 at 9:07 AM CDT  Left general message asking patient to call back [facility] [phone].    Pennie Ramires LPN    ------    Notes recorded by Riki Douglas MD on 10/17/2019 at 5:12 PM CDT  Dr. Radha MAST    Staff- Please call the patient with pathology results.  The biopsy on his chest was benign. The pathologist thought the spot was a scar.    The spot we biopsied on the back of his neck was an atypical mole. The pathologist saw some, but not all of the features necessary to diagnose a skin cancer so could not call it completely benign or malignant.   The treatment recommendation is to have the spot re-excised to remove the whole thing.   Ok to schedule after telephone screening.     Thank you.   Dermatological path order and indications   Order: [id number]   Status:  Final result   Visible to patient:  No (Inaccessible in MyChart) Dx:  Neoplasm of uncertain behavior of skin   Component 1mo ago   Copath Report Patient Name: DAHLSTROM, DUANE W   MR#: [id number]   Specimen #: [id number]   Collected: 10/10/2019   Received: 10/10/2019   Reported: 10/16/2019 12:30   Ordering Phy(s): RIKI DOUGLAS     For improved result formatting, select 'View Enhanced Report Format' under    Linked Documents section.     SPECIMEN(S):   A: Shave, right medial  chest   B: Shave, central posterior neck     FINAL DIAGNOSIS:   A. Shave, right medial chest:   - Dermal fibrosis and mixed inflammation consistent with scar - (see   description)     B. Shave, central posterior neck:   - Lentiginous compound melanocytic nevus with moderate to severe atypia -   (see comment and description)

## 2019-12-05 LAB — COPATH REPORT: NORMAL

## 2020-03-25 ENCOUNTER — TELEPHONE (OUTPATIENT)
Dept: FAMILY MEDICINE | Facility: OTHER | Age: 70
End: 2020-03-25

## 2020-03-25 NOTE — TELEPHONE ENCOUNTER
Spoke to wife there is not a current consent to communicate that I see. Patient will be calling clinic back.  Please reschedule Duane out after July 6th for physical.     Lizz Wood MA 3/25/2020  11:29 AM

## 2020-09-10 ENCOUNTER — TELEPHONE (OUTPATIENT)
Dept: FAMILY MEDICINE | Facility: OTHER | Age: 70
End: 2020-09-10

## 2020-09-10 DIAGNOSIS — Z13.6 CARDIOVASCULAR SCREENING; LDL GOAL LESS THAN 160: Primary | ICD-10-CM

## 2020-09-10 NOTE — TELEPHONE ENCOUNTER
Reason for Call:  Other call back    Detailed comments: Patients wife calling wondering if he can get fasting lab work ordered for his yearly physical so he can get them don before his appointment. Please advise     Phone Number Patient can be reached at: Home number on file [phone] (home)    Best Time: anytime     Can we leave a detailed message on this number? YES    Call taken on 9/10/2020 at 10:08 AM by Miranda Seipel Vaccari

## 2020-09-11 NOTE — TELEPHONE ENCOUNTER
Per Dr. Cam jules to schedule patient for fasting lab before his physical on Wednesday. See orders. Patient advised and scheduled for Sunday.  Mila Rg CMA

## 2020-09-13 DIAGNOSIS — Z13.6 CARDIOVASCULAR SCREENING; LDL GOAL LESS THAN 160: ICD-10-CM

## 2020-09-13 LAB
ANION GAP SERPL CALCULATED.3IONS-SCNC: 3 MMOL/L (ref 3–14)
BUN SERPL-MCNC: 21 MG/DL (ref 7–30)
CALCIUM SERPL-MCNC: 9.1 MG/DL (ref 8.5–10.1)
CHLORIDE SERPL-SCNC: 109 MMOL/L (ref 94–109)
CHOLEST SERPL-MCNC: 201 MG/DL
CO2 SERPL-SCNC: 30 MMOL/L (ref 20–32)
CREAT SERPL-MCNC: 1.09 MG/DL (ref 0.66–1.25)
GFR SERPL CREATININE-BSD FRML MDRD: 69 ML/MIN/{1.73_M2}
GLUCOSE SERPL-MCNC: 94 MG/DL (ref 70–99)
HDLC SERPL-MCNC: 79 MG/DL
LDLC SERPL CALC-MCNC: 110 MG/DL
NONHDLC SERPL-MCNC: 122 MG/DL
POTASSIUM SERPL-SCNC: 3.9 MMOL/L (ref 3.4–5.3)
SODIUM SERPL-SCNC: 142 MMOL/L (ref 133–144)
TRIGL SERPL-MCNC: 61 MG/DL

## 2020-09-13 PROCEDURE — 80048 BASIC METABOLIC PNL TOTAL CA: CPT | Performed by: FAMILY MEDICINE

## 2020-09-13 PROCEDURE — 36415 COLL VENOUS BLD VENIPUNCTURE: CPT | Performed by: FAMILY MEDICINE

## 2020-09-13 PROCEDURE — 80061 LIPID PANEL: CPT | Performed by: FAMILY MEDICINE

## 2020-09-16 ENCOUNTER — TELEPHONE (OUTPATIENT)
Dept: FAMILY MEDICINE | Facility: OTHER | Age: 70
End: 2020-09-16

## 2020-09-16 ENCOUNTER — OFFICE VISIT (OUTPATIENT)
Dept: FAMILY MEDICINE | Facility: CLINIC | Age: 70
End: 2020-09-16
Payer: COMMERCIAL

## 2020-09-16 VITALS
WEIGHT: 168.5 LBS | HEIGHT: 70 IN | RESPIRATION RATE: 16 BRPM | DIASTOLIC BLOOD PRESSURE: 84 MMHG | BODY MASS INDEX: 24.12 KG/M2 | OXYGEN SATURATION: 100 % | TEMPERATURE: 98.4 F | SYSTOLIC BLOOD PRESSURE: 134 MMHG | HEART RATE: 68 BPM

## 2020-09-16 DIAGNOSIS — Z12.5 SCREENING FOR PROSTATE CANCER: ICD-10-CM

## 2020-09-16 DIAGNOSIS — Z12.11 SPECIAL SCREENING FOR MALIGNANT NEOPLASMS, COLON: ICD-10-CM

## 2020-09-16 DIAGNOSIS — Z00.00 ENCOUNTER FOR MEDICARE ANNUAL WELLNESS EXAM: Primary | ICD-10-CM

## 2020-09-16 LAB — PSA SERPL-ACNC: 2.29 UG/L (ref 0–4)

## 2020-09-16 PROCEDURE — G0438 PPPS, INITIAL VISIT: HCPCS | Performed by: FAMILY MEDICINE

## 2020-09-16 PROCEDURE — G0103 PSA SCREENING: HCPCS | Performed by: FAMILY MEDICINE

## 2020-09-16 PROCEDURE — 36415 COLL VENOUS BLD VENIPUNCTURE: CPT | Performed by: FAMILY MEDICINE

## 2020-09-16 ASSESSMENT — ENCOUNTER SYMPTOMS
FEVER: 0
SORE THROAT: 0
DIARRHEA: 0
JOINT SWELLING: 0
PALPITATIONS: 0
HEMATURIA: 0
SHORTNESS OF BREATH: 0
MYALGIAS: 0
ABDOMINAL PAIN: 0
DIZZINESS: 0
ARTHRALGIAS: 0
DYSURIA: 0
COUGH: 0
WEAKNESS: 0
HEADACHES: 0
NERVOUS/ANXIOUS: 0
NAUSEA: 0
CHILLS: 0
PARESTHESIAS: 0
HEARTBURN: 0
FREQUENCY: 0
EYE PAIN: 0
HEMATOCHEZIA: 0
CONSTIPATION: 0

## 2020-09-16 ASSESSMENT — ACTIVITIES OF DAILY LIVING (ADL): CURRENT_FUNCTION: NO ASSISTANCE NEEDED

## 2020-09-16 ASSESSMENT — MIFFLIN-ST. JEOR: SCORE: 1545.56

## 2020-09-16 ASSESSMENT — PAIN SCALES - GENERAL: PAINLEVEL: NO PAIN (0)

## 2020-09-16 NOTE — LETTER
September 16, 2020      Duane W Dahlstrom  [address] [place] [id number]        Dear ,    We are writing to inform you of your test results.    Your test results fall within the expected range(s) or remain unchanged from previous results.  Please continue with your current treatment plan.   .       Riccardo Levy MD     Resulted Orders   PSA, screen   Result Value Ref Range    PSA 2.29 0 - 4 ug/L      Comment:      Assay Method:  Chemiluminescence using Siemens Vista analyzer       If you have any questions or concerns, please call the clinic at the number listed above.

## 2020-09-16 NOTE — LETTER
[facility]  [address] [facility] [address] [phone]        September 17, 2020    Duane W Dahlstrom  [address]  [facility] [address]

## 2020-09-16 NOTE — LETTER

## 2020-09-16 NOTE — PATIENT INSTRUCTIONS
Patient Education   Personalized Prevention Plan  You are due for the preventive services outlined below.  Your care team is available to assist you in scheduling these services.  If you have already completed any of these items, please share that information with your care team to update in your medical record.  Health Maintenance Due   Topic Date Due     Pneumococcal Vaccine (1 of 2 - PCV13) 12/09/2017     Annual Wellness Visit  11/30/2018     Diptheria Tetanus Pertussis (DTAP/TDAP/TD) Vaccine (4 - Td) 04/26/2020     Flu Vaccine (1) 09/01/2020     Preventive Health Recommendations  See your health care provider every year to    Review health changes.     Discuss preventive care.      Review your medicines if your doctor has prescribed any.    Talk with your health care provider about whether you should have a test to screen for prostate cancer (PSA).    Every 3 years, have a diabetes test (fasting glucose). If you are at risk for diabetes, you should have this test more often.    Every 5 years, have a cholesterol test. Have this test more often if you are at risk for high cholesterol or heart disease.     Every 10 years, have a colonoscopy. Or, have a yearly FIT test (stool test). These exams will check for colon cancer.    Talk to with your health care provider about screening for Abdominal Aortic Aneurysm if you have a family history of AAA or have a history of smoking.    Shots:     Get a flu shot each year.     Get a tetanus shot every 10 years.     Talk to your doctor about your pneumonia vaccines. There are now two you should receive - Pneumovax (PPSV 23) and Prevnar (PCV 13).    Talk to your pharmacist about a shingles vaccine.     Talk to your doctor about the hepatitis B vaccine.    Nutrition:     Eat at least 5 servings of fruits and vegetables each day.     Eat whole-grain bread, whole-wheat pasta and brown rice instead of white grains and rice.     Get adequate Calcium and Vitamin D.      Lifestyle    Exercise for at least 150 minutes a week (30 minutes a day, 5 days a week). This will help you control your weight and prevent disease.     Limit alcohol to one drink per day.     No smoking.     Wear sunscreen to prevent skin cancer.     See your dentist every six months for an exam and cleaning.     See your eye doctor every 1 to 2 years to screen for conditions such as glaucoma, macular degeneration and cataracts.    Personalized Prevention Plan  You are due for the preventive services outlined below.  Your care team is available to assist you in scheduling these services.  If you have already completed any of these items, please share that information with your care team to update in your medical record.  Health Maintenance   Topic Date Due     PNEUMOCOCCAL IMMUNIZATION 65+ LOW/MEDIUM RISK (1 of 2 - PCV13) 12/09/2017     MEDICARE ANNUAL WELLNESS VISIT  11/30/2018     DTAP/TDAP/TD IMMUNIZATION (4 - Td) 04/26/2020     INFLUENZA VACCINE (1) 09/01/2020     COLORECTAL CANCER SCREENING  11/19/2020     FALL RISK ASSESSMENT  09/16/2021     LIPID  09/13/2025     ADVANCE CARE PLANNING  09/16/2025     HEPATITIS C SCREENING  Completed     PHQ-2  Completed     ZOSTER IMMUNIZATION  Completed     AORTIC ANEURYSM SCREENING (SYSTEM ASSIGNED)  Completed     IPV IMMUNIZATION  Aged Out     MENINGITIS IMMUNIZATION  Aged Out     HEPATITIS B IMMUNIZATION  Aged Out       Exercise for a Healthier Heart     Exercise with a friend. When activity is fun, you're more likely to stick with it.   You may wonder how you can improve the health of your heart. If you re thinking about exercise, you re on the right track. You don t need to become an athlete, but you do need a certain amount of brisk exercise to help strengthen your heart. If you have been diagnosed with a heart condition, your doctor may recommend exercise to help stabilize your condition. To help make exercise a habit, choose safe, fun activities.  Be sure to  check with your healthcare provider before starting an exercise program.  Why exercise?  Exercising regularly offers many healthy rewards. It can help you do all of the following:    Improve your blood cholesterol level to help prevent further heart trouble    Lower your blood pressure to help prevent a stroke or heart attack    Control diabetes, or reduce your risk of getting this disease    Improve your heart and lung function    Reach and maintain a healthy weight    Make your muscles stronger and more limber so you can stay active    Prevent falls and fractures by slowing the loss of bone mass (osteoporosis)    Manage stress better    Reduce your blood pressure    Improve your sense of self and your body image  Exercise tips  Ease into your routine. Set small goals. Then build on them.  Exercise on most days. Aim for a total of 150 or more minutes of moderate to  vigorous intensity activity each week. Consider 40 minutes, 3 to 4 times a week. For best results, activity should last for 40 minutes on average. It is OK to work up to the 40 minute period over time. Examples of moderate-intensity activity is walking 1 mile in 15 minutes or 30 to 45 minutes of yard work.  Step up your daily activity level. Along with your exercise program, try being more active throughout the day. Walk instead of drive. Do more household tasks or yard work.  Choose one or more activities you enjoy. Walking is one of the easiest things you can do. You can also try swimming, riding a bike, dancing, or taking an exercise class.  Stop exercising and call your doctor if you:    Have chest pain or feel dizzy or lightheaded    Feel burning, tightness, pressure, or heaviness in your chest, neck, shoulders, back, or arms    Have unusual shortness of breath    Have increased joint or muscle pain    Have palpitations or an irregular heartbeat  Date Last Reviewed: 5/1/2016 2000-2019 [employer]. [address] [facility] [place]  [place] [address]. All rights reserved. This information is not intended as a substitute for professional medical care. Always follow your healthcare professional's instructions.          Understanding USDA MyPlate  The USDA (U.S. Department of Agriculture) has guidelines to help you make healthy food choices. These are called MyPlate. MyPlate shows the food groups that make up healthy meals using the image of a place setting. Before you eat, think about the healthiest choices for what to put onto your plate or into your cup or bowl. To learn more about building a healthy plate, visit www.choosemyplate.gov.    The food groups    Fruits. Any fruit or 100% fruit juice counts as part of the Fruit Group. Fruits may be fresh, canned, frozen, or dried, and may be whole, cut-up, or pureed. Make half your plate fruits and vegetables.    Vegetables. Any vegetable or 100% vegetable juice counts as a member of the Vegetable Group. Vegetables may be fresh, frozen, canned, or dried. They can be served raw or cooked and may be whole, cut-up, or mashed. Make half your plate fruits and vegetables.    Grains. All foods made from grains are part of the Grains Group. These include wheat, rice, oats, cornmeal, and barley such as bread, pasta, oatmeal, cereal, tortillas, and grits. Grains should be no more than a quarter of your plate. At least half of your grains should be whole grains.    Protein. This group includes meat, poultry, seafood, beans and peas, eggs, processed soy products (like tofu), nuts (including nut butters), and seeds. Make protein choices no more than a quarter of your plate. Meat and poultry choices should be lean or low fat.    Dairy. All fluid milk products and foods made from milk that contain calcium, like yogurt and cheese, are part of the Dairy Group. (Foods that have little calcium, such as cream, butter, and cream cheese, are not part of the group.) Most dairy choices should be low-fat or fat-free.    Oils. These  are fats that are liquid at room temperature. They include canola, corn, olive, soybean, and sunflower oil. Foods that are mainly oil include mayonnaise, certain salad dressings, and soft margarines. You should have only 5 to 7 teaspoons of oils a day. You probably already get this much from the food you eat.  Date Last Reviewed: 8/1/2017 2000-2019 [employer]. [address] [place] [address], [place] [address]. All rights reserved. This information is not intended as a substitute for professional medical care. Always follow your healthcare professional's instructions.

## 2020-09-16 NOTE — LETTER

## 2020-09-16 NOTE — TELEPHONE ENCOUNTER
Left message for patient to return call to schedule EGD/colonoscopy. If Shanta or Dolores are not available, please transfer to same day surgery

## 2020-09-16 NOTE — PROGRESS NOTES
"SUBJECTIVE:   Duane W Dahlstrom is a 69 year old male who presents for Preventive Visit.      Patient has been advised of split billing requirements and indicates understanding: Yes   Are you in the first 12 months of your Medicare coverage?  No    Healthy Habits:     In general, how would you rate your overall health?  Good    Frequency of exercise:  1 day/week    Duration of exercise:  N/A    Do you usually eat at least 4 servings of fruit and vegetables a day, include whole grains    & fiber and avoid regularly eating high fat or \"junk\" foods?  No    Taking medications regularly:  Not Applicable    Barriers to taking medications:  None    Medication side effects:  None    Ability to successfully perform activities of daily living:  No assistance needed    Home Safety:  No safety concerns identified    Hearing Impairment:  No hearing concerns    In the past 6 months, have you been bothered by leaking of urine?  No    In general, how would you rate your overall mental or emotional health?  Good      PHQ-2 Total Score: 0    Additional concerns today:  No    Do you feel safe in your environment? Yes    Have you ever done Advance Care Planning? (For example, a Health Directive, POLST, or a discussion with a medical provider or your loved ones about your wishes): No, advance care planning information given to patient to review.  Advanced care planning was discussed at today's visit.      Fall risk  Fallen 2 or more times in the past year?: No  Any fall with injury in the past year?: No    Cognitive Screening   1) Repeat 3 items (Leader, Season, Table)    2) Clock draw: NORMAL  3) 3 item recall: Recalls 3 objects  Results: 3 items recalled: COGNITIVE IMPAIRMENT LESS LIKELY    Mini-CogTM Copyright MIGNON Johnson. Licensed by the author for use in [facility]; reprinted with permission (marleen@.[facility]). All rights reserved.      Do you have sleep apnea, excessive snoring or daytime drowsiness?: no    Reviewed and " updated as needed this visit by clinical staff  Tobacco  Allergies  Meds  Problems  Med Hx  Surg Hx  Fam Hx  Soc Hx          Reviewed and updated as needed this visit by Provider  Allergies  Meds  Problems        Social History     Tobacco Use     Smoking status: Never Smoker     Smokeless tobacco: Never Used   Substance Use Topics     Alcohol use: Yes     Comment: social      If you drink alcohol do you typically have >3 drinks per day or >7 drinks per week? No    Alcohol Use 9/16/2020   Prescreen: >3 drinks/day or >7 drinks/week? No   Prescreen: >3 drinks/day or >7 drinks/week? -               Current providers sharing in care for this patient include:   Patient Care Team:  Riccardo Levy MD as PCP - General (Family Practice)  Riccardo Levy MD as Assigned PCP    The following health maintenance items are reviewed in Epic and correct as of today:  Health Maintenance   Topic Date Due     DTAP/TDAP/TD IMMUNIZATION (4 - Td) 04/26/2020     INFLUENZA VACCINE (1) 09/01/2020     COLORECTAL CANCER SCREENING  11/19/2020     MEDICARE ANNUAL WELLNESS VISIT  09/16/2021     FALL RISK ASSESSMENT  09/16/2021     LIPID  09/13/2025     ADVANCE CARE PLANNING  09/16/2025     HEPATITIS C SCREENING  Completed     PHQ-2  Completed     PNEUMOCOCCAL IMMUNIZATION 65+ LOW/MEDIUM RISK  Completed     ZOSTER IMMUNIZATION  Completed     AORTIC ANEURYSM SCREENING (SYSTEM ASSIGNED)  Completed     IPV IMMUNIZATION  Aged Out     MENINGITIS IMMUNIZATION  Aged Out     HEPATITIS B IMMUNIZATION  Aged Out     Labs reviewed in EPIC  BP Readings from Last 3 Encounters:   09/16/20 134/84   12/02/19 134/78   06/20/18 143/78    Wt Readings from Last 3 Encounters:   09/16/20 76.4 kg (168 lb 8 oz)   06/20/18 81.2 kg (179 lb)   03/19/18 80.5 kg (177 lb 6.4 oz)                  Patient Active Problem List   Diagnosis     Varicose veins     CARDIOVASCULAR SCREENING; LDL GOAL LESS THAN 160     Advanced directives, counseling/discussion     Erectile  dysfunction, unspecified erectile dysfunction type     Peyronie's disease     Past Surgical History:   Procedure Laterality Date     COLONOSCOPY  12/01/08     COLONOSCOPY N/A 11/19/2015    Procedure: COLONOSCOPY;  Surgeon: Alonso Gaitan MD;  Location: [facility]     ENDOSCOPIC LIGATION VEIN(S)       HC COLONOSCOPY THRU STOMA WITH BIOPSY  1996       Social History     Tobacco Use     Smoking status: Never Smoker     Smokeless tobacco: Never Used   Substance Use Topics     Alcohol use: Yes     Comment: social      Family History   Problem Relation Age of Onset     Cancer - colorectal Father      Prostate Cancer Father      Heart Disease Father         by-pass surgery in 1993         Current Outpatient Medications   Medication Sig Dispense Refill     IBUPROFEN 200 MG OR CAPS 1-3 CAPS EVERY 6 HOURS AS NEEDED for pain/fever       TYLENOL 325 MG OR TABS 1-2 TABS EVERY 4 HOURS AS NEEDED       Allergies   Allergen Reactions     No Known Drug Allergies      Recent Labs   Lab Test 09/13/20  0817 01/13/15  0828 11/25/13  0839   * 94 120   HDL 79 49 62   TRIG 61 75 77   CR 1.09 0.97 1.03   GFRESTIMATED 69 78 74   GFRESTBLACK 81 >90   GFR Calc   89   POTASSIUM 3.9 3.6 4.4   TSH  --   --  1.16      Pneumonia Vaccine:Adults age 65+ who have not received previous Pneumovax (PPSV23) or PCV13 as an adult: Should first be given PCV13 AND then should be given PPSV23 6-12 months after PCV13    Review of Systems   Constitutional: Negative for chills and fever.   HENT: Negative for congestion, ear pain, hearing loss and sore throat.    Eyes: Negative for pain and visual disturbance.   Respiratory: Negative for cough and shortness of breath.    Cardiovascular: Negative for chest pain, palpitations and peripheral edema.   Gastrointestinal: Negative for abdominal pain, constipation, diarrhea, heartburn, hematochezia and nausea.   Genitourinary: Negative for discharge, dysuria, frequency, genital sores, hematuria,  "impotence and urgency.   Musculoskeletal: Negative for arthralgias, joint swelling and myalgias.   Skin: Negative for rash.   Neurological: Negative for dizziness, weakness, headaches and paresthesias.   Psychiatric/Behavioral: Negative for mood changes. The patient is not nervous/anxious.      CONSTITUTIONAL: NEGATIVE for fever, chills, change in weight  ENT/MOUTH: NEGATIVE for ear, mouth and throat problems  RESP: NEGATIVE for significant cough or SOB  CV: NEGATIVE for chest pain, palpitations or peripheral edema and POSITIVE for varicose veins  GI: NEGATIVE for nausea, abdominal pain, heartburn, or change in bowel habits  : negative for dysuria, hematuria, decreased urinary stream, erectile dysfunction  ROS otherwise negative    OBJECTIVE:   /84 (BP Location: Right arm, Patient Position: Chair, Cuff Size: Adult Regular)   Pulse 68   Temp 98.4  F (36.9  C) (Temporal)   Resp 16   Ht 1.778 m (5' 10\")   Wt 76.4 kg (168 lb 8 oz)   SpO2 100%   BMI 24.18 kg/m   Estimated body mass index is 24.18 kg/m  as calculated from the following:    Height as of this encounter: 1.778 m (5' 10\").    Weight as of this encounter: 76.4 kg (168 lb 8 oz).  Physical Exam  GENERAL: healthy, alert and no distress  EYES: Eyes grossly normal to inspection, PERRL and conjunctivae and sclerae normal  HENT: ear canals and TM's normal, nose and mouth without ulcers or lesions  NECK: no adenopathy, no asymmetry, masses, or scars and thyroid normal to palpation  RESP: lungs clear to auscultation - no rales, rhonchi or wheezes  CV: regular rate and rhythm, normal S1 S2, no S3 or S4, no murmur, click or rub, no peripheral edema and peripheral pulses strong  ABDOMEN: soft, nontender, no hepatosplenomegaly, no masses and bowel sounds normal  MS: varicose veins bilateral LE  SKIN: no suspicious lesions or rashes  NEURO: Normal strength and tone, mentation intact and speech normal  PSYCH: mentation appears normal, affect " "normal/bright    Diagnostic Test Results:  Labs reviewed in Epic    ASSESSMENT / PLAN:       ICD-10-CM    1. Encounter for Medicare annual wellness exam  Z00.00    2. Special screening for malignant neoplasms, colon  Z12.11 GASTROENTEROLOGY ADULT REF PROCEDURE ONLY   3. Screening for prostate cancer  Z12.5 PSA, screen       Patient has been advised of split billing requirements and indicates understanding: Yes  COUNSELING:  Reviewed preventive health counseling, as reflected in patient instructions       Regular exercise       Healthy diet/nutrition       Vision screening       Dental care       Bladder control       Fall risk prevention       Immunizations    Vaccinated for: Influenza and Td at [facility]           Colon cancer screening       Prostate cancer screening       The 10-year ASCVD risk score (Vivien BRANDT Jr., et al., 2013) is: 12.6%    Values used to calculate the score:      Age: 69 years      Sex: Male      Is Non- : No      Diabetic: No      Tobacco smoker: No      Systolic Blood Pressure: 134 mmHg      Is BP treated: No      HDL Cholesterol: 79 mg/dL      Total Cholesterol: 201 mg/dL    Estimated body mass index is 24.18 kg/m  as calculated from the following:    Height as of this encounter: 1.778 m (5' 10\").    Weight as of this encounter: 76.4 kg (168 lb 8 oz).        He reports that he has never smoked. He has never used smokeless tobacco.      Appropriate preventive services were discussed with this patient, including applicable screening as appropriate for cardiovascular disease, diabetes, osteopenia/osteoporosis, and glaucoma.  As appropriate for age/gender, discussed screening for colorectal cancer, prostate cancer, breast cancer, and cervical cancer. Checklist reviewing preventive services available has been given to the patient.    Reviewed patients plan of care and provided an AVS. The Basic Care Plan (routine screening as documented in Health Maintenance) " for Duane meets the Care Plan requirement. This Care Plan has been established and reviewed with the Patient.    Counseling Resources:  ATP IV Guidelines  Pooled Cohorts Equation Calculator  Breast Cancer Risk Calculator  Breast Cancer: Medication to Reduce Risk  FRAX Risk Assessment  ICSI Preventive Guidelines  Dietary Guidelines for Americans, 2010  USDA's MyPlate  ASA Prophylaxis  Lung CA Screening    Riccardo Levy MD  [facility]    Identified Health Risks:

## 2020-09-17 ENCOUNTER — HOSPITAL ENCOUNTER (OUTPATIENT)
Facility: CLINIC | Age: 70
End: 2020-09-17
Attending: INTERNAL MEDICINE | Admitting: INTERNAL MEDICINE
Payer: COMMERCIAL

## 2020-09-17 DIAGNOSIS — Z11.59 ENCOUNTER FOR SCREENING FOR OTHER VIRAL DISEASES: Primary | ICD-10-CM

## 2020-09-17 NOTE — TELEPHONE ENCOUNTER
Date of colonoscopy/EGD: 11/16/20  Surgeon: Dr. Ni  Prep:Miralax  Packet:Colonoscopy/EGD instructions mailed to patient's home address.   Date: 9/17/2020      Surgery Scheduler

## 2020-11-11 ENCOUNTER — TELEPHONE (OUTPATIENT)
Dept: FAMILY MEDICINE | Facility: CLINIC | Age: 70
End: 2020-11-11

## 2020-11-11 DIAGNOSIS — Z11.59 ENCOUNTER FOR SCREENING FOR OTHER VIRAL DISEASES: Primary | ICD-10-CM

## 2020-11-11 NOTE — TELEPHONE ENCOUNTER
Reason for Call:  Other call back    Detailed comments: Pt called wanting to reschedule colonoscopy    Phone Number Patient can be reached at: Cell number on file:    Telephone Information:   Mobile [phone]       Best Time: any    Can we leave a detailed message on this number? YES    Call taken on 11/11/2020 at 8:59 AM by Carmen Jones

## 2021-01-25 ENCOUNTER — MYC MEDICAL ADVICE (OUTPATIENT)
Dept: FAMILY MEDICINE | Facility: CLINIC | Age: 71
End: 2021-01-25

## 2021-01-25 DIAGNOSIS — Z12.11 COLON CANCER SCREENING: Primary | ICD-10-CM

## 2021-01-27 ENCOUNTER — TELEPHONE (OUTPATIENT)
Dept: FAMILY MEDICINE | Facility: CLINIC | Age: 71
End: 2021-01-27

## 2021-01-27 NOTE — TELEPHONE ENCOUNTER
Date of procedure: 3/22  Colonoscopy  Surgeon: Dr. Ni  Prep:Miralax  Packet:Colonoscopy/EGD instructions mailed to patient's home address.   Date: 1/27/2021      Surgery Scheduler

## 2021-01-27 NOTE — LETTER
[address] [address]  [phone]        January 27, 2021    Duane W Dahlstrom  [id number] [address] [place] [id number]

## 2021-03-04 DIAGNOSIS — Z11.59 ENCOUNTER FOR SCREENING FOR OTHER VIRAL DISEASES: ICD-10-CM

## 2021-03-18 DIAGNOSIS — Z11.59 ENCOUNTER FOR SCREENING FOR OTHER VIRAL DISEASES: ICD-10-CM

## 2021-03-18 LAB
LABORATORY COMMENT REPORT: NORMAL
SARS-COV-2 RNA RESP QL NAA+PROBE: NEGATIVE
SARS-COV-2 RNA RESP QL NAA+PROBE: NORMAL
SPECIMEN SOURCE: NORMAL
SPECIMEN SOURCE: NORMAL

## 2021-03-18 PROCEDURE — 87635 SARS-COV-2 COVID-19 AMP PRB: CPT | Performed by: FAMILY MEDICINE

## 2021-03-22 ENCOUNTER — HOSPITAL ENCOUNTER (OUTPATIENT)
Facility: CLINIC | Age: 71
Discharge: HOME | End: 2021-03-22
Attending: FAMILY MEDICINE | Admitting: FAMILY MEDICINE
Payer: COMMERCIAL

## 2021-03-22 ENCOUNTER — SURGERY (OUTPATIENT)
Age: 71
End: 2021-03-22
Payer: COMMERCIAL

## 2021-03-22 ENCOUNTER — ANESTHESIA EVENT (OUTPATIENT)
Dept: GASTROENTEROLOGY | Facility: CLINIC | Age: 71
End: 2021-03-22
Payer: COMMERCIAL

## 2021-03-22 ENCOUNTER — ANESTHESIA (OUTPATIENT)
Dept: GASTROENTEROLOGY | Facility: CLINIC | Age: 71
End: 2021-03-22
Payer: COMMERCIAL

## 2021-03-22 VITALS
SYSTOLIC BLOOD PRESSURE: 118 MMHG | RESPIRATION RATE: 17 BRPM | TEMPERATURE: 97.5 F | OXYGEN SATURATION: 98 % | HEART RATE: 71 BPM | DIASTOLIC BLOOD PRESSURE: 69 MMHG

## 2021-03-22 LAB — COLONOSCOPY: NORMAL

## 2021-03-22 PROCEDURE — 45380 COLONOSCOPY AND BIOPSY: CPT | Mod: PT | Performed by: FAMILY MEDICINE

## 2021-03-22 PROCEDURE — 45380 COLONOSCOPY AND BIOPSY: CPT | Performed by: FAMILY MEDICINE

## 2021-03-22 PROCEDURE — 88305 TISSUE EXAM BY PATHOLOGIST: CPT | Mod: 26 | Performed by: PATHOLOGY

## 2021-03-22 PROCEDURE — 370N000017 HC ANESTHESIA TECHNICAL FEE, PER MIN: Performed by: FAMILY MEDICINE

## 2021-03-22 PROCEDURE — 88305 TISSUE EXAM BY PATHOLOGIST: CPT | Mod: TC | Performed by: FAMILY MEDICINE

## 2021-03-22 PROCEDURE — 250N000009 HC RX 250: Performed by: NURSE ANESTHETIST, CERTIFIED REGISTERED

## 2021-03-22 PROCEDURE — 250N000011 HC RX IP 250 OP 636: Performed by: NURSE ANESTHETIST, CERTIFIED REGISTERED

## 2021-03-22 PROCEDURE — 258N000003 HC RX IP 258 OP 636: Performed by: NURSE ANESTHETIST, CERTIFIED REGISTERED

## 2021-03-22 RX ORDER — PROPOFOL 10 MG/ML
INJECTION, EMULSION INTRAVENOUS PRN
Status: DISCONTINUED | OUTPATIENT
Start: 2021-03-22 | End: 2021-03-22

## 2021-03-22 RX ORDER — PROPOFOL 10 MG/ML
INJECTION, EMULSION INTRAVENOUS CONTINUOUS PRN
Status: DISCONTINUED | OUTPATIENT
Start: 2021-03-22 | End: 2021-03-22

## 2021-03-22 RX ORDER — NALOXONE HYDROCHLORIDE 0.4 MG/ML
0.4 INJECTION, SOLUTION INTRAMUSCULAR; INTRAVENOUS; SUBCUTANEOUS
Status: DISCONTINUED | OUTPATIENT
Start: 2021-03-22 | End: 2021-03-22 | Stop reason: HOSPADM

## 2021-03-22 RX ORDER — LIDOCAINE 40 MG/G
CREAM TOPICAL
Status: DISCONTINUED | OUTPATIENT
Start: 2021-03-22 | End: 2021-03-22 | Stop reason: HOSPADM

## 2021-03-22 RX ORDER — SODIUM CHLORIDE, SODIUM LACTATE, POTASSIUM CHLORIDE, CALCIUM CHLORIDE 600; 310; 30; 20 MG/100ML; MG/100ML; MG/100ML; MG/100ML
INJECTION, SOLUTION INTRAVENOUS CONTINUOUS
Status: DISCONTINUED | OUTPATIENT
Start: 2021-03-22 | End: 2021-03-22 | Stop reason: HOSPADM

## 2021-03-22 RX ORDER — ONDANSETRON 4 MG/1
4 TABLET, ORALLY DISINTEGRATING ORAL EVERY 30 MIN PRN
Status: DISCONTINUED | OUTPATIENT
Start: 2021-03-22 | End: 2021-03-22 | Stop reason: HOSPADM

## 2021-03-22 RX ORDER — ONDANSETRON 2 MG/ML
4 INJECTION INTRAMUSCULAR; INTRAVENOUS
Status: DISCONTINUED | OUTPATIENT
Start: 2021-03-22 | End: 2021-03-22 | Stop reason: HOSPADM

## 2021-03-22 RX ORDER — NALOXONE HYDROCHLORIDE 0.4 MG/ML
0.2 INJECTION, SOLUTION INTRAMUSCULAR; INTRAVENOUS; SUBCUTANEOUS
Status: DISCONTINUED | OUTPATIENT
Start: 2021-03-22 | End: 2021-03-22 | Stop reason: HOSPADM

## 2021-03-22 RX ORDER — ONDANSETRON 2 MG/ML
4 INJECTION INTRAMUSCULAR; INTRAVENOUS EVERY 30 MIN PRN
Status: DISCONTINUED | OUTPATIENT
Start: 2021-03-22 | End: 2021-03-22 | Stop reason: HOSPADM

## 2021-03-22 RX ORDER — LIDOCAINE HYDROCHLORIDE 20 MG/ML
INJECTION, SOLUTION INFILTRATION; PERINEURAL PRN
Status: DISCONTINUED | OUTPATIENT
Start: 2021-03-22 | End: 2021-03-22

## 2021-03-22 RX ORDER — ONDANSETRON 2 MG/ML
4 INJECTION INTRAMUSCULAR; INTRAVENOUS EVERY 6 HOURS PRN
Status: DISCONTINUED | OUTPATIENT
Start: 2021-03-22 | End: 2021-03-22 | Stop reason: HOSPADM

## 2021-03-22 RX ORDER — ONDANSETRON 4 MG/1
4 TABLET, ORALLY DISINTEGRATING ORAL EVERY 6 HOURS PRN
Status: DISCONTINUED | OUTPATIENT
Start: 2021-03-22 | End: 2021-03-22 | Stop reason: HOSPADM

## 2021-03-22 RX ORDER — FLUMAZENIL 0.1 MG/ML
0.2 INJECTION, SOLUTION INTRAVENOUS
Status: DISCONTINUED | OUTPATIENT
Start: 2021-03-22 | End: 2021-03-22 | Stop reason: HOSPADM

## 2021-03-22 RX ORDER — MEPERIDINE HYDROCHLORIDE 25 MG/ML
12.5 INJECTION INTRAMUSCULAR; INTRAVENOUS; SUBCUTANEOUS
Status: DISCONTINUED | OUTPATIENT
Start: 2021-03-22 | End: 2021-03-22 | Stop reason: HOSPADM

## 2021-03-22 RX ORDER — PROCHLORPERAZINE MALEATE 5 MG
5 TABLET ORAL EVERY 6 HOURS PRN
Status: DISCONTINUED | OUTPATIENT
Start: 2021-03-22 | End: 2021-03-22 | Stop reason: HOSPADM

## 2021-03-22 RX ADMIN — PROPOFOL 50 MG: 10 INJECTION, EMULSION INTRAVENOUS at 07:49

## 2021-03-22 RX ADMIN — SODIUM CHLORIDE, POTASSIUM CHLORIDE, SODIUM LACTATE AND CALCIUM CHLORIDE: 600; 310; 30; 20 INJECTION, SOLUTION INTRAVENOUS at 07:29

## 2021-03-22 RX ADMIN — LIDOCAINE HYDROCHLORIDE 60 MG: 20 INJECTION, SOLUTION INFILTRATION; PERINEURAL at 07:49

## 2021-03-22 RX ADMIN — PROPOFOL 200 MCG/KG/MIN: 10 INJECTION, EMULSION INTRAVENOUS at 07:49

## 2021-03-22 NOTE — ANESTHESIA PREPROCEDURE EVALUATION
Anesthesia Pre-Procedure Evaluation    Patient: Duane W Dahlstrom   MRN: [id number] : 1950        Preoperative Diagnosis: Colon cancer screening [Z12.11]   Procedure : Procedure(s):  COLONOSCOPY     Past Medical History:   Diagnosis Date     Allergic rhinitis, cause unspecified     Seasonal rhinitis     Syncope and collapse      Varicose veins of leg       Past Surgical History:   Procedure Laterality Date     COLONOSCOPY  08     COLONOSCOPY N/A 2015    Procedure: COLONOSCOPY;  Surgeon: Alonso Gaitan MD;  Location: [facility]     ENDOSCOPIC LIGATION VEIN(S)       HC COLONOSCOPY THRU STOMA WITH BIOPSY        Allergies   Allergen Reactions     No Known Drug Allergies       Social History     Tobacco Use     Smoking status: Never Smoker     Smokeless tobacco: Never Used   Substance Use Topics     Alcohol use: Yes     Comment: social       Wt Readings from Last 1 Encounters:   20 76.4 kg (168 lb 8 oz)        Anesthesia Evaluation   Pt has had prior anesthetic.     No history of anesthetic complications       ROS/MED HX  ENT/Pulmonary:  - neg pulmonary ROS     Neurologic:  - neg neurologic ROS     Cardiovascular:  - neg cardiovascular ROS   (+) -----Previous cardiac testing   Echo: Date: Results:    Stress Test: Date: Results:    ECG Reviewed: Date: 09 Results:  SR with PACs  Cath: Date: Results:      METS/Exercise Tolerance:     Hematologic:  - neg hematologic  ROS     Musculoskeletal:  - neg musculoskeletal ROS     GI/Hepatic:  - neg GI/hepatic ROS  (-) GERD   Renal/Genitourinary:  - neg Renal ROS     Endo:  - neg endo ROS     Psychiatric/Substance Use:  - neg psychiatric ROS     Infectious Disease:  - neg infectious disease ROS     Malignancy:  - neg malignancy ROS     Other:  - neg other ROS          Physical Exam    Airway        Mallampati: II   TM distance: > 3 FB   Neck ROM: full   Mouth opening: > 3 cm    Respiratory Devices and Support         Dental  no notable dental  history         Cardiovascular   cardiovascular exam normal       Rhythm and rate: regular and normal     Pulmonary   pulmonary exam normal        breath sounds clear to auscultation           OUTSIDE LABS:  CBC:   Lab Results   Component Value Date    WBC 3.3 (L) 12/01/2008    HGB 15.1 12/01/2008    HCT 43.8 12/01/2008     12/01/2008     BMP:   Lab Results   Component Value Date     09/13/2020     01/13/2015    POTASSIUM 3.9 09/13/2020    POTASSIUM 3.6 01/13/2015    CHLORIDE 109 09/13/2020    CHLORIDE 106 01/13/2015    CO2 30 09/13/2020    CO2 29 01/13/2015    BUN 21 09/13/2020    BUN 14 01/13/2015    CR 1.09 09/13/2020    CR 0.97 01/13/2015    GLC 94 09/13/2020    GLC 95 01/13/2015     COAGS: No results found for: PTT, INR, FIBR  POC: No results found for: BGM, HCG, HCGS  HEPATIC:   Lab Results   Component Value Date    ALBUMIN 4.2 12/01/2008    PROTTOTAL 7.1 12/01/2008    ALT 28 12/01/2008    AST 46 12/01/2008    ALKPHOS 66 12/01/2008    BILITOTAL 2.0 (H) 12/01/2008     OTHER:   Lab Results   Component Value Date    RC 9.1 09/13/2020    TSH 1.16 11/25/2013       Anesthesia Plan    ASA Status:  2   NPO Status:  NPO Appropriate    Anesthesia Type: MAC.     - Reason for MAC: straight local not clinically adequate   Induction: Intravenous, Propofol.   Maintenance: TIVA.        Consents    Anesthesia Plan(s) and associated risks, benefits, and realistic alternatives discussed. Questions answered and patient/representative(s) expressed understanding.     - Discussed with:  Patient      - Extended Intubation/Ventilatory Support Discussed: No.      - Patient is DNR/DNI Status: No    Use of blood products discussed: No .     Postoperative Care            Comments:    Dr Ni performed the H&P pre-op and I listened to this.  No change from my exam            WALKER Andre CRNA

## 2021-03-22 NOTE — ANESTHESIA POSTPROCEDURE EVALUATION
Patient: Duane W Dahlstrom    Procedure(s):  COLONOSCOPY, WITH POLYPECTOMY by [facility]    Diagnosis:Colon cancer screening [Z12.11]  Diagnosis Additional Information: No value filed.    Anesthesia Type:  MAC    Note:  Disposition: Outpatient   Postop Pain Control: Uneventful            Sign Out: Well controlled pain   PONV: No   Neuro/Psych: Uneventful            Sign Out: Acceptable/Baseline neuro status   Airway/Respiratory: Uneventful            Sign Out: Acceptable/Baseline resp. status   CV/Hemodynamics: Uneventful            Sign Out: Acceptable CV status   Other NRE: NONE   DID A NON-ROUTINE EVENT OCCUR? No    Event details/Postop Comments:  Pt was happy with anesthesia care.  No complications.  I will follow up with the pt if needed.         Last vitals:  Vitals:    03/22/21 0840 03/22/21 0850 03/22/21 0900   BP: 114/71 120/68 118/69   Pulse: 73 71 71   Resp: 14  17   Temp:   97.5  F (36.4  C)   SpO2: 98% 98% 98%       Last vitals prior to Anesthesia Care Transfer:  CRNA VITALS  3/22/2021 0752 - 3/22/2021 0852      3/22/2021             Resp Rate (observed):  (!) 5          Electronically Signed By: WALKER Andre CRNA  March 22, 2021  9:14 AM

## 2021-03-22 NOTE — H&P
"Pre-Endoscopy History and Physical     Duane W Dahlstrom MRN# [id number]   YOB: 1950 Age: 70 year old     Date of Procedure: 3/22/2021  Primary care provider: Riccardo Levy  Type of Endoscopy: colonoscopy  Type of Anesthesia Anticipated: MAC     HPI:    Duane is a 70 year old male who will be undergoing screening or surveillance colonoscopy.    Duane is feeling well today. Can get up a single flight of stairs without dyspnea. Estimated METS > 4.     Patient Active Problem List   Diagnosis     Varicose veins     CARDIOVASCULAR SCREENING; LDL GOAL LESS THAN 160     Advanced directives, counseling/discussion     Erectile dysfunction, unspecified erectile dysfunction type     Peyronie's disease          REVIEW OF SYSTEMS:     5 point ROS negative except as noted above in HPI, including Gen., Resp., CV, GI &  system review.      PHYSICAL EXAM:   BP (!) 145/85   Temp 97.7  F (36.5  C) (Oral)   Resp 20   SpO2 99%    Estimated body mass index is 24.18 kg/m  as calculated from the following:    Height as of 9/16/20: 1.778 m (5' 10\").    Weight as of 9/16/20: 76.4 kg (168 lb 8 oz).    GENERAL APPEARANCE: alert and no distress  RESP: lungs clear and unlabored  CV: regular  ABD: soft, nt/nd    DIAGNOSTICS:    Not indicated  COVID-19 PCR Results    COVID-19 PCR Results 3/18/21 3/18/21    0734 0734   COVID-19 Virus PCR to [facility] - Result Test received-See reflex to IDDL test SARS CoV2 (COVID-19) Virus RT-PCR    COVID-19 Virus PCR to [facility] - Source Nasopharyngeal    SARS-CoV-2 Virus Specimen Source  Nasopharyngeal   SARS-CoV-2 PCR Result  NEGATIVE      Comments are available for some flowsheets but are not being displayed.         COVID-19 Antibody Results, Testing for Immunity    COVID-19 Antibody Results, Testing for Immunity   No data to display.              IMPRESSION   ASA Class 1 - Healthy patient, no medical problems          PLAN:     Plan for colonoscopy. No medical contraindications to proceed, " or further work up needed. The risks and benefits of the procedure and the sedation options and risks were discussed with the patient. These include infection, bleeding, and small risk of colon perforation (1/1000 to 1/65466 depending on patient characteristics and type of procedure). Duane was also explained to alternatives for colo-rectal screening. All questions were answered and informed consent was obtained.      The above has been forwarded to the consulting provider.      Signed Electronically by: Jose Ni MD  March 22, 2021

## 2021-03-22 NOTE — ANESTHESIA CARE TRANSFER NOTE
Patient: Duane W Dahlstrom    Procedure(s):  COLONOSCOPY, WITH POLYPECTOMY by [facility]    Diagnosis: Colon cancer screening [Z12.11]  Diagnosis Additional Information: No value filed.    Anesthesia Type:   MAC     Note:    Oropharynx: oropharynx clear of all foreign objects and spontaneously breathing  Level of Consciousness: drowsy  Oxygen Supplementation: face mask    Independent Airway: airway patency satisfactory and stable  Dentition: dentition unchanged  Vital Signs Stable: post-procedure vital signs reviewed and stable  Report to RN Given: handoff report given  Patient transferred to: Phase II    Handoff Report: Identifed the Patient, Identified the Reponsible Provider, Reviewed the pertinent medical history, Discussed the surgical course, Reviewed Intra-OP anesthesia mangement and issues during anesthesia, Set expectations for post-procedure period and Allowed opportunity for questions and acknowledgement of understanding      Vitals: (Last set prior to Anesthesia Care Transfer)  CRNA VITALS  3/22/2021 0752 - 3/22/2021 0834      3/22/2021             Resp Rate (observed):  (!) 5        Electronically Signed By: WALKER Andre CRNA  March 22, 2021  8:34 AM

## 2021-03-23 LAB — COPATH REPORT: NORMAL

## 2021-03-26 ENCOUNTER — OFFICE VISIT (OUTPATIENT)
Dept: DERMATOLOGY | Facility: CLINIC | Age: 71
End: 2021-03-26
Payer: COMMERCIAL

## 2021-03-26 DIAGNOSIS — Z86.018 HISTORY OF DYSPLASTIC NEVUS: Primary | ICD-10-CM

## 2021-03-26 DIAGNOSIS — L57.0 ACTINIC KERATOSES: ICD-10-CM

## 2021-03-26 DIAGNOSIS — D48.5 NEOPLASM OF UNCERTAIN BEHAVIOR OF SKIN: ICD-10-CM

## 2021-03-26 DIAGNOSIS — D22.9 MULTIPLE BENIGN NEVI: ICD-10-CM

## 2021-03-26 PROCEDURE — 11103 TANGNTL BX SKIN EA SEP/ADDL: CPT | Performed by: DERMATOLOGY

## 2021-03-26 PROCEDURE — 17000 DESTRUCT PREMALG LESION: CPT | Mod: XS | Performed by: DERMATOLOGY

## 2021-03-26 PROCEDURE — 99213 OFFICE O/P EST LOW 20 MIN: CPT | Mod: 25 | Performed by: DERMATOLOGY

## 2021-03-26 PROCEDURE — 88305 TISSUE EXAM BY PATHOLOGIST: CPT | Performed by: PATHOLOGY

## 2021-03-26 PROCEDURE — 88342 IMHCHEM/IMCYTCHM 1ST ANTB: CPT | Performed by: PATHOLOGY

## 2021-03-26 PROCEDURE — 11102 TANGNTL BX SKIN SINGLE LES: CPT | Performed by: DERMATOLOGY

## 2021-03-26 ASSESSMENT — PAIN SCALES - GENERAL: PAINLEVEL: NO PAIN (0)

## 2021-03-26 NOTE — PROGRESS NOTES
[facility] Dermatology Note  Encounter Date: Mar 26, 2021  Office Visit     Dermatology Problem List:  1. Actinic Keratosis   -s/p cryotherapy  2. History of atypical/dysplastic nevi  -Lentiginous compound melanocytic nevus with moderate to severe atypia, central posterior neck s/p biopsy 10/16/2019 s/p excision 12/02/2019  -Junctional nevus with atypical features, right (lateral) thigh, s/p excision 12/20/2016  -Junctional nevus with moderate dysplasia, lower paraspinal back, s/p biopsy 11/14/2016  -Junctional dysplastic nevus with mild atypia, left chest, s/p biopsy 4/10/2015  -Lentiginous compound melanocytic nevus with mild atypia, upper back, s/p biopsy 4/10/2015  -Patient reports two prior atypical lesions in 1980  3. Neurofibroma, upper mid back, s/p biopsy 11/9/2015  4. Folliculitis  5. NUB - left mid paraspinal back, left scapula - bx 3/26/21    ____________________________________________    Assessment & Plan:    # History of atypical/dysplastic nevi   - Continue annual skin exams and report new or changing lesions    -ABCD's of melanoma were reviewed with patient and handout provided.   -Recommend sunscreens SPF #30 or greater, protective clothing and avoidance of tanning beds.      #  Clinically benign nevi   - No further intervention required. Patient to report changes    # Neoplasm of uncertain behavior on the left mid paraspinal back. The differential diagnosis includes DN vs other.   - See procedure note.     # Neoplasm of uncertain behavior on the left scapula . The differential diagnosis includes DN vs other. .   - See procedure note     # Actinic keratosis - right nasal sidewall   - See procedure note     # 5 scaly papules with crusting on right knee, right shin (lower lesion which patient does not recall) and left knee - reported after a fall   - Patient to return to clinic if not resolved in 3 weeks. Will make visit      Procedures Performed:   -Shave biopsy: Location(s) left  mid paraspinal back, left scapula.  After discussion of benefits and risks including but not limited to bleeding/bruising, pain/swelling, infection, scar, incomplete removal, nerve damage/numbness, recurrence, and non-diagnostic biopsy, written consent, verbal consent and photographs were obtained. Time-out was performed. The area was cleaned with isopropyl alcohol. 0.5mL of 1% lidocaine with epinephrine was injected to obtain adequate anesthesia of each lesion. Shave biopsy was performed. Hemostasis was achieved with aluminium chloride. Vaseline and a sterile dressing were applied. The patient tolerated the procedure and no complications were noted. The patient was provided with verbal and written post care instructions.   -Cryotherapy procedure note: After verbal consent and discussion of risks and benefits including but no limited to dyspigmentation/scar, blister, and pain, 1 AKwas(were) treated with 1-2mm freeze border for 2 cycles with liquid nitrogen. Post cryotherapy instructions were provided.       Follow-up: 1 year(s) in-person, or earlier for new or changing lesions    Staff and Scribe:     Scribe Disclosure:   I, Spencer Jacob, am serving as a scribe to document services personally performed by this physician, Dr. Lulú Fish, based on data collection and the provider's statements to me.     Favian Hendrix, MS2    Staff Physician:  I was present with the medical student who participated in the service and in the documentation of the note. I have verified the history and personally performed the physical exam and medical decision making. I agree with the assessment and plan of care as documented in the note.     Provider Disclosure:   The documentation recorded by the scribe accurately reflects the services I personally performed and the decisions made by me.    I performed the procedures  Lulú Fish MD    Department of Dermatology  [facility]: Phone: [phone], Fax:[phone]  [facility]: Phone: [phone], Fax: [phone]        Lulú Fish MD    Department of Dermatology  [facility]: Phone: [phone], Fax:[phone]  [facility]: Phone: [phone], Fax: [phone]  3/26/2021      ____________________________________________    CC: Skin Check (areas of concern: none hx atypical mole)    HPI:  Mr. Duane W Dahlstrom is a(n) 70 year old male who presents today as a return patient for routine annual skin exam with no particular concerns today. He notes his scar on his central posterior neck from his biopsy one year ago has healed well. No family history of melanoma.    Last seen by Dr. Conrad on 12/02/2020 for the excision of a lentiginous compound melanocytic nevus with moderate to severe atypia on the central posterior neck.    Patient is otherwise feeling well, without additional skin concerns.    Labs Reviewed:  SPECIMEN(S):   Ellipse, central posterior neck     FINAL DIAGNOSIS:   Skin, central posterior neck, excision:   - Small focus of residual atypical junctional nevus, adjacent to scar,   completely excised - (see description)     Physical Exam:  Vitals: There were no vitals taken for this visit.  SKIN: Total skin excluding the undergarment areas was performed. The exam included the head/face, neck, both arms, chest, back, abdomen, both legs, digits and/or nails.   - Left mid paraspinal back: 5 mm pigmented lesion  - Left scapula: 3 mm pigmented macule with erythema  -There are well healed surgical scarswithout erythema, nodularity or telangiectasias at the sites of previous DN.  - Multiple regular brown pigmented macules and papules are identified on the chest, back, and extremities.   - Erythema overlying upper back   - There is no  erythema, telangectasias, nodularity, or pigmentation on the sites of prior biopsy.   - There is an erythematous macule with overyling adherent scale on the right nasal sidewall.  - Five scaly papules with crusting on right knee, right shin and left knee  - No other lesions of concern on areas examined.     Medications:  Current Outpatient Medications   Medication     IBUPROFEN 200 MG OR CAPS     TYLENOL 325 MG OR TABS     No current facility-administered medications for this visit.       Past Medical History:   Patient Active Problem List   Diagnosis     Varicose veins     CARDIOVASCULAR SCREENING; LDL GOAL LESS THAN 160     Advanced directives, counseling/discussion     Erectile dysfunction, unspecified erectile dysfunction type     Peyronie's disease     Past Medical History:   Diagnosis Date     Allergic rhinitis, cause unspecified     Seasonal rhinitis     Syncope and collapse 1998     Varicose veins of leg         CC No referring provider defined for this encounter. on close of this encounter.

## 2021-03-26 NOTE — NURSING NOTE
Duane W Dahlstrom's goals for this visit include:   Chief Complaint   Patient presents with     Skin Check     areas of concern: none hx atypical mole       He requests these members of his care team be copied on today's visit information:     PCP: Riccardo Levy    Referring Provider:  No referring provider defined for this encounter.    There were no vitals taken for this visit.    Do you need any medication refills at today's visit? Uma Woody LPN

## 2021-03-26 NOTE — NURSING NOTE
The following medication was given:     MEDICATION:  Lidocaine with epinephrine 1% 1:[id number]  ROUTE: SQ  SITE: see procedure note  DOSE: 1cc  LOT #: [id number]  : Crystal  EXPIRATION DATE: 2/2022  NDC#: 5780-9258-86   Was there drug waste? 1cc  Multi-dose vial: Yes    Lidya Woody LPN  March 26, 2021

## 2021-03-26 NOTE — LETTER
3/26/2021         RE: Duane W Dahlstrom  [address]  [place] [address]        Dear Colleague,    Thank you for referring your patient, Duane W Dahlstrom, to the [facility]. Please see a copy of my visit note below.    [facility] Dermatology Note  Encounter Date: Mar 26, 2021  Office Visit     Dermatology Problem List:  1. Actinic Keratosis   -s/p cryotherapy  2. History of atypical/dysplastic nevi  -Lentiginous compound melanocytic nevus with moderate to severe atypia, central posterior neck s/p biopsy 10/16/2019 s/p excision 12/02/2019  -Junctional nevus with atypical features, right (lateral) thigh, s/p excision 12/20/2016  -Junctional nevus with moderate dysplasia, lower paraspinal back, s/p biopsy 11/14/2016  -Junctional dysplastic nevus with mild atypia, left chest, s/p biopsy 4/10/2015  -Lentiginous compound melanocytic nevus with mild atypia, upper back, s/p biopsy 4/10/2015  -Patient reports two prior atypical lesions in 1980  3. Neurofibroma, upper mid back, s/p biopsy 11/9/2015  4. Folliculitis  5. NUB - left mid paraspinal back, left scapula - bx 3/26/21    ____________________________________________    Assessment & Plan:    # History of atypical/dysplastic nevi   - Continue annual skin exams and report new or changing lesions    -ABCD's of melanoma were reviewed with patient and handout provided.   -Recommend sunscreens SPF #30 or greater, protective clothing and avoidance of tanning beds.      #  Clinically benign nevi   - No further intervention required. Patient to report changes    # Neoplasm of uncertain behavior on the left mid paraspinal back. The differential diagnosis includes DN vs other.   - See procedure note.     # Neoplasm of uncertain behavior on the left scapula . The differential diagnosis includes DN vs other. .   - See procedure note     # Actinic keratosis - right nasal sidewall   - See procedure note     # 5 scaly papules with  crusting on right knee, right shin (lower lesion which patient does not recall) and left knee - reported after a fall   - Patient to return to clinic if not resolved in 3 weeks. Will make visit      Procedures Performed:   -Shave biopsy: Location(s) left mid paraspinal back, left scapula.  After discussion of benefits and risks including but not limited to bleeding/bruising, pain/swelling, infection, scar, incomplete removal, nerve damage/numbness, recurrence, and non-diagnostic biopsy, written consent, verbal consent and photographs were obtained. Time-out was performed. The area was cleaned with isopropyl alcohol. 0.5mL of 1% lidocaine with epinephrine was injected to obtain adequate anesthesia of each lesion. Shave biopsy was performed. Hemostasis was achieved with aluminium chloride. Vaseline and a sterile dressing were applied. The patient tolerated the procedure and no complications were noted. The patient was provided with verbal and written post care instructions.   -Cryotherapy procedure note: After verbal consent and discussion of risks and benefits including but no limited to dyspigmentation/scar, blister, and pain, 1 AKwas(were) treated with 1-2mm freeze border for 2 cycles with liquid nitrogen. Post cryotherapy instructions were provided.       Follow-up: 1 year(s) in-person, or earlier for new or changing lesions    Staff and Scribe:     Scribe Disclosure:   I, Spencer Jacob, am serving as a scribe to document services personally performed by this physician, Dr. Lulú Fish, based on data collection and the provider's statements to me.     Favian Hendrix, MS2    Staff Physician:  I was present with the medical student who participated in the service and in the documentation of the note. I have verified the history and personally performed the physical exam and medical decision making. I agree with the assessment and plan of care as documented in the note.     Provider Disclosure:   The documentation  recorded by the scribe accurately reflects the services I personally performed and the decisions made by me.    I performed the procedures  Lulú Fish MD    Department of Dermatology  [facility]: Phone: [phone], Fax:[phone]  [facility]: Phone: [phone], Fax: [phone]        Lulú Fish MD    Department of Dermatology  [facility]: Phone: [phone], Fax:[phone]  [facility]: Phone: [phone], Fax: [phone]  3/26/2021      ____________________________________________    CC: Skin Check (areas of concern: none hx atypical mole)    HPI:  Mr. Duane W Dahlstrom is a(n) 70 year old male who presents today as a return patient for routine annual skin exam with no particular concerns today. He notes his scar on his central posterior neck from his biopsy one year ago has healed well. No family history of melanoma.    Last seen by Dr. Conrad on 12/02/2020 for the excision of a lentiginous compound melanocytic nevus with moderate to severe atypia on the central posterior neck.    Patient is otherwise feeling well, without additional skin concerns.    Labs Reviewed:  SPECIMEN(S):   Ellipse, central posterior neck     FINAL DIAGNOSIS:   Skin, central posterior neck, excision:   - Small focus of residual atypical junctional nevus, adjacent to scar,   completely excised - (see description)     Physical Exam:  Vitals: There were no vitals taken for this visit.  SKIN: Total skin excluding the undergarment areas was performed. The exam included the head/face, neck, both arms, chest, back, abdomen, both legs, digits and/or nails.   - Left mid paraspinal back: 5 mm pigmented lesion  - Left scapula: 3 mm pigmented macule with erythema  -There are well  healed surgical scarswithout erythema, nodularity or telangiectasias at the sites of previous DN.  - Multiple regular brown pigmented macules and papules are identified on the chest, back, and extremities.   - Erythema overlying upper back   - There is no erythema, telangectasias, nodularity, or pigmentation on the sites of prior biopsy.   - There is an erythematous macule with overyling adherent scale on the right nasal sidewall.  - Five scaly papules with crusting on right knee, right shin and left knee  - No other lesions of concern on areas examined.     Medications:  Current Outpatient Medications   Medication     IBUPROFEN 200 MG OR CAPS     TYLENOL 325 MG OR TABS     No current facility-administered medications for this visit.       Past Medical History:   Patient Active Problem List   Diagnosis     Varicose veins     CARDIOVASCULAR SCREENING; LDL GOAL LESS THAN 160     Advanced directives, counseling/discussion     Erectile dysfunction, unspecified erectile dysfunction type     Peyronie's disease     Past Medical History:   Diagnosis Date     Allergic rhinitis, cause unspecified     Seasonal rhinitis     Syncope and collapse 1998     Varicose veins of leg         CC No referring provider defined for this encounter. on close of this encounter.      Again, thank you for allowing me to participate in the care of your patient.        Sincerely,        Lulú Fish MD

## 2021-03-26 NOTE — PATIENT INSTRUCTIONS
Wound Care After a Biopsy    What is a skin biopsy?  A skin biopsy allows the doctor to examine a very small piece of tissue under the microscope to determine the diagnosis and the best treatment for the skin condition. A local anesthetic (numbing medicine)  is injected with a very small needle into the skin area to be tested. A small piece of skin is taken from the area. Sometimes a suture (stitch) is used.     What are the risks of a skin biopsy?  I will experience scar, bleeding, swelling, pain, crusting and redness. I may experience incomplete removal or recurrence. Risks of this procedure are excessive bleeding, bruising, infection, nerve damage, numbness, thick (hypertrophic or keloidal) scar and non-diagnostic biopsy.    How should I care for my wound for the first 24 hours?    Keep the wound dry and covered for 24 hours    If it bleeds, hold direct pressure on the area for 15 minutes. If bleeding does not stop then go to the emergency room    Avoid strenuous exercise the first 1-2 days or as your doctor instructs you    How should I care for the wound after 24 hours?    After 24 hours, remove the bandage    You may bathe or shower as normal    If you had a scalp biopsy, you can shampoo as usual and can use shower water to clean the biopsy site daily    Clean the wound twice a day with gentle soap and water    Do not scrub, be gentle    Apply white petroleum/Vaseline after cleaning the wound with a cotton swab or a clean finger, and keep the site covered with a Bandaid /bandage. Bandages are not necessary with a scalp biopsy    If you are unable to cover the site with a Bandaid /bandage, re-apply ointment 2-3 times a day to keep the site moist. Moisture will help with healing    Avoid strenuous activity for first 1-2 days    Avoid lakes, rivers, pools, and oceans until the stitches are removed or the site is healed    How do I clean my wound?    Wash hands thoroughly with soap or use hand  before all  wound care    Clean the wound with gentle soap and water    Apply white petroleum/Vaseline  to wound after it is clean    Replace the Bandaid /bandage to keep the wound covered for the first few days or as instructed by your doctor    If you had a scalp biopsy, warm shower water to the area on a daily basis should suffice    What should I use to clean my wound?     Cotton-tipped applicators (Qtips )    White petroleum jelly (Vaseline ). Use a clean new container and use Q-tips to apply.    Bandaids   as needed    Gentle soap     How should I care for my wound long term?    Do not get your wound dirty    Keep up with wound care for one week or until the area is healed.    A small scab will form and fall off by itself when the area is completely healed. The area will be red and will become pink in color as it heals. Sun protection is very important for how your scar will turn out. Sunscreen with an SPF 30 or greater is recommended once the area is healed.    You should have some soreness but it should be mild and slowly go away over several days. Talk to your doctor about using tylenol for pain,    When should I call my doctor?  If you have increased:     Pain or swelling    Pus or drainage (clear or slightly yellow drainage is ok)    Temperature over 100F    Spreading redness or warmth around wound    When will I hear about my results?  The biopsy results can take 2-3 weeks to come back. The clinic will call you with the results, send you a Ebixt message, or have you schedule a follow-up clinic or phone time to discuss the results. Contact our clinics if you do not hear from us in 3 weeks.     Who should I call with questions?    [facility]: [phone]     [facility]: [phone]    For urgent needs outside of business hours call the [facility] at [phone] and ask for the dermatology resident on call      Cryotherapy    What is it?    Use  of a very cold liquid, such as liquid nitrogen, to freeze and destroy abnormal skin cells that need to be removed    What should I expect?    Tenderness and redness    A small blister that might grow and fill with dark purple blood. There may be crusting.    More than one treatment may be needed if the lesions do not go away.    How do I care for the treated area?    Gently wash the area with your hands when bathing.    Use a thin layer of Vaseline to help with healing. You may use a Band-Aid.     The area should heal within 7-10 days and may leave behind a pink or lighter color.     Do not use an antibiotic or Neosporin ointment.     You may take acetaminophen (Tylenol) for pain.     Call your Doctor if you have:    Severe pain    Signs of infection (warmth, redness, cloudy yellow drainage, and or a bad smell)    Questions or concerns    Who should I call with questions?       [facility]: [phone]       [facility]: [phone]       For urgent needs outside of business hours call the [facility] at [phone]        and ask for the dermatology resident on call      Patient Education     Checking for Skin Cancer  You can find cancer early by checking your skin each month. There are 3 kinds of skin cancer. They are melanoma, basal cell carcinoma, and squamous cell carcinoma. Doing monthly skin checks is the best way to find new marks or skin changes. Follow the instructions below for checking your skin.   The ABCDEs of checking moles for melanoma   Check your moles or growths for signs of melanoma using ABCDE:     Asymmetry: the sides of the mole or growth don t match    Border: the edges are ragged, notched, or blurred    Color: the color within the mole or growth varies    Diameter: the mole or growth is larger than 6 mm (size of a pencil eraser)    Evolving: the size, shape, or color of the mole or growth is changing (evolving is not  shown in the images below)    Checking for other types of skin cancer  Basal cell carcinoma or squamous cell carcinoma have symptoms such as:       A spot or mole that looks different from all other marks on your skin    Changes in how an area feels, such as itching, tenderness, or pain    Changes in the skin's surface, such as oozing, bleeding, or scaliness    A sore that does not heal    New swelling or redness beyond the border of a mole    Who s at risk?  Anyone can get skin cancer. But you are at greater risk if you have:     Fair skin, light-colored hair, or light-colored eyes    Many moles or abnormal moles on your skin    A history of sunburns from sunlight or tanning beds    A family history of skin cancer    A history of exposure to radiation or chemicals    A weakened immune system  If you have had skin cancer in the past, you are at risk for recurring skin cancer.   How to check your skin  Do your monthly skin checkups in front of a full-length mirror. Check all parts of your body, including your:     Head (ears, face, neck, and scalp)    Torso (front, back, and sides)    Arms (tops, undersides, upper, and lower armpits)    Hands (palms, backs, and fingers, including under the nails)    Buttocks and genitals    Legs (front, back, and sides)    Feet (tops, soles, toes, including under the nails, and between toes)  If you have a lot of moles, take digital photos of them each month. Make sure to take photos both up close and from a distance. These can help you see if any moles change over time.   Most skin changes are not cancer. But if you see any changes in your skin, call your doctor right away. Only he or she can diagnose a problem. If you have skin cancer, seeing your doctor can be the first step toward getting the treatment that could save your life.   [employer] last reviewed this educational content on 4/1/2019 2000-2020 [employer]. [address] [place] [address], [place] [address]. All  rights reserved. This information is not intended as a substitute for professional medical care. Always follow your healthcare professional's instructions.       When should I call my doctor?    If you are worsening or not improving, please, contact us or seek urgent care as noted below.     Who should I call with questions (adults)?    [facility] (adult and pediatric): [phone]     [facility] (adult): [phone]    For urgent needs outside of business hours call the [facility] at [phone] and ask for the dermatology resident on call    If this is a medical emergency and you are unable to reach an ER, Call 911      Who should I call with questions (pediatric)?  [facility]- Pediatric Dermatology  Dr. Cecilia Honeycutt, Dr. Bob Mejia, Dr. Re Payne, Marilyn Ramirez, OANH Meeks, Dr. Sheron Goodman & Dr. Zi Lou  Non Urgent  Nurse Triage Line; [phone]- [employer] Care Coordinatorroshan Pickens (/Complex ) [phone]    If you need a prescription refill, please contact your pharmacy. Refills are approved or denied by our Physicians during normal business hours, Monday through Fridays  Per office policy, refills will not be granted if you have not been seen within the past year (or sooner depending on your child's condition)    Scheduling Information:  Pediatric Appointment Scheduling and Call Center [phone]  Radiology Scheduling- [phone]  Sedation Unit Scheduling- [phone]  [facility] Scheduling- General [phone]; Pediatric Dermatology [phone]  Main  Services: [phone]  Uzbek: [phone]  Bruneian: [phone]  Hmong/Argentine/Tomasz: [phone]  Preadmission Nursing Department Fax Number: [phone] (Fax all pre-operative paperwork to this number)    For urgent matters arising during evenings, weekends, or  holidays that cannot wait for normal business hours please call [phone] and ask for the Dermatology Resident On-Call to be paged.

## 2021-03-30 LAB — COPATH REPORT: NORMAL

## 2021-09-01 ENCOUNTER — DOCUMENTATION ONLY (OUTPATIENT)
Dept: OTHER | Facility: CLINIC | Age: 71
End: 2021-09-01

## 2021-09-21 ENCOUNTER — DOCUMENTATION ONLY (OUTPATIENT)
Dept: OTHER | Facility: CLINIC | Age: 71
End: 2021-09-21

## 2021-10-01 ENCOUNTER — MYC MEDICAL ADVICE (OUTPATIENT)
Dept: FAMILY MEDICINE | Facility: CLINIC | Age: 71
End: 2021-10-01

## 2021-10-01 DIAGNOSIS — Z13.6 CARDIOVASCULAR SCREENING; LDL GOAL LESS THAN 160: ICD-10-CM

## 2021-10-01 DIAGNOSIS — Z12.5 SCREENING FOR PROSTATE CANCER: Primary | ICD-10-CM

## 2021-10-01 DIAGNOSIS — Z00.00 ENCOUNTER FOR MEDICARE ANNUAL WELLNESS EXAM: ICD-10-CM

## 2021-10-01 NOTE — TELEPHONE ENCOUNTER
Please place future orders if needed. No lab work is due for health maintenance.  Farzana Tello MA     10/1/2021

## 2021-10-08 ASSESSMENT — ENCOUNTER SYMPTOMS
SORE THROAT: 0
EYE PAIN: 0
FREQUENCY: 0
JOINT SWELLING: 0
DYSURIA: 0
HEARTBURN: 0
FEVER: 0
HEMATOCHEZIA: 0
NERVOUS/ANXIOUS: 0
ABDOMINAL PAIN: 0
COUGH: 0
MYALGIAS: 0
DIARRHEA: 0
CONSTIPATION: 0
HEADACHES: 0
DIZZINESS: 0
NAUSEA: 0
WEAKNESS: 0
CHILLS: 0
PALPITATIONS: 0
PARESTHESIAS: 0
HEMATURIA: 0
ARTHRALGIAS: 0
SHORTNESS OF BREATH: 0

## 2021-10-08 ASSESSMENT — ACTIVITIES OF DAILY LIVING (ADL): CURRENT_FUNCTION: NO ASSISTANCE NEEDED

## 2021-10-11 ENCOUNTER — LAB (OUTPATIENT)
Dept: LAB | Facility: CLINIC | Age: 71
End: 2021-10-11
Payer: COMMERCIAL

## 2021-10-11 DIAGNOSIS — Z00.00 ENCOUNTER FOR MEDICARE ANNUAL WELLNESS EXAM: ICD-10-CM

## 2021-10-11 DIAGNOSIS — Z13.6 CARDIOVASCULAR SCREENING; LDL GOAL LESS THAN 160: ICD-10-CM

## 2021-10-11 DIAGNOSIS — Z12.5 SCREENING FOR PROSTATE CANCER: ICD-10-CM

## 2021-10-11 LAB
ANION GAP SERPL CALCULATED.3IONS-SCNC: 3 MMOL/L (ref 3–14)
BUN SERPL-MCNC: 15 MG/DL (ref 7–30)
CALCIUM SERPL-MCNC: 8.7 MG/DL (ref 8.5–10.1)
CHLORIDE BLD-SCNC: 110 MMOL/L (ref 94–109)
CHOLEST SERPL-MCNC: 189 MG/DL
CO2 SERPL-SCNC: 28 MMOL/L (ref 20–32)
CREAT SERPL-MCNC: 1.12 MG/DL (ref 0.66–1.25)
FASTING STATUS PATIENT QL REPORTED: NO
GFR SERPL CREATININE-BSD FRML MDRD: 67 ML/MIN/1.73M2
GLUCOSE BLD-MCNC: 102 MG/DL (ref 70–99)
HDLC SERPL-MCNC: 82 MG/DL
LDLC SERPL CALC-MCNC: 96 MG/DL
NONHDLC SERPL-MCNC: 107 MG/DL
POTASSIUM BLD-SCNC: 4.2 MMOL/L (ref 3.4–5.3)
PSA SERPL-MCNC: 2.39 UG/L (ref 0–4)
SODIUM SERPL-SCNC: 141 MMOL/L (ref 133–144)
TRIGL SERPL-MCNC: 57 MG/DL

## 2021-10-11 PROCEDURE — 80061 LIPID PANEL: CPT

## 2021-10-11 PROCEDURE — G0103 PSA SCREENING: HCPCS

## 2021-10-11 PROCEDURE — 36415 COLL VENOUS BLD VENIPUNCTURE: CPT

## 2021-10-11 PROCEDURE — 80048 BASIC METABOLIC PNL TOTAL CA: CPT

## 2021-10-14 ENCOUNTER — OFFICE VISIT (OUTPATIENT)
Dept: FAMILY MEDICINE | Facility: CLINIC | Age: 71
End: 2021-10-14
Payer: COMMERCIAL

## 2021-10-14 VITALS
DIASTOLIC BLOOD PRESSURE: 82 MMHG | RESPIRATION RATE: 16 BRPM | WEIGHT: 173.2 LBS | SYSTOLIC BLOOD PRESSURE: 136 MMHG | HEART RATE: 80 BPM | TEMPERATURE: 97.7 F | BODY MASS INDEX: 24.79 KG/M2 | OXYGEN SATURATION: 99 % | HEIGHT: 70 IN

## 2021-10-14 DIAGNOSIS — Z00.00 ENCOUNTER FOR MEDICARE ANNUAL WELLNESS EXAM: ICD-10-CM

## 2021-10-14 DIAGNOSIS — Z23 NEED FOR PROPHYLACTIC VACCINATION AND INOCULATION AGAINST INFLUENZA: Primary | ICD-10-CM

## 2021-10-14 PROCEDURE — G0008 ADMIN INFLUENZA VIRUS VAC: HCPCS | Performed by: FAMILY MEDICINE

## 2021-10-14 PROCEDURE — 99397 PER PM REEVAL EST PAT 65+ YR: CPT | Mod: 25 | Performed by: FAMILY MEDICINE

## 2021-10-14 PROCEDURE — 90662 IIV NO PRSV INCREASED AG IM: CPT | Performed by: FAMILY MEDICINE

## 2021-10-14 ASSESSMENT — ENCOUNTER SYMPTOMS
ABDOMINAL PAIN: 0
FEVER: 0
NERVOUS/ANXIOUS: 0
FREQUENCY: 0
DIZZINESS: 0
NAUSEA: 0
DIARRHEA: 0
HEADACHES: 0
CONSTIPATION: 0
SHORTNESS OF BREATH: 0
EYE PAIN: 0
WEAKNESS: 0
MYALGIAS: 0
PARESTHESIAS: 0
HEMATURIA: 0
HEMATOCHEZIA: 0
COUGH: 0
PALPITATIONS: 0
ARTHRALGIAS: 0
CHILLS: 0
HEARTBURN: 0
SORE THROAT: 0
DYSURIA: 0
JOINT SWELLING: 0

## 2021-10-14 ASSESSMENT — MIFFLIN-ST. JEOR: SCORE: 1561.88

## 2021-10-14 ASSESSMENT — ACTIVITIES OF DAILY LIVING (ADL): CURRENT_FUNCTION: NO ASSISTANCE NEEDED

## 2021-10-14 ASSESSMENT — PAIN SCALES - GENERAL: PAINLEVEL: NO PAIN (0)

## 2021-10-14 NOTE — PROGRESS NOTES
"SUBJECTIVE:   Duane W Dahlstrom is a 70 year old male who presents for Preventive Visit.    Patient has been advised of split billing requirements and indicates understanding: Yes   Are you in the first 12 months of your Medicare coverage?  No    Healthy Habits:     In general, how would you rate your overall health?  Excellent    Frequency of exercise:  None    Do you usually eat at least 4 servings of fruit and vegetables a day, include whole grains    & fiber and avoid regularly eating high fat or \"junk\" foods?  No    Taking medications regularly:  Yes    Medication side effects:  Not applicable and None    Ability to successfully perform activities of daily living:  No assistance needed    Home Safety:  No safety concerns identified    Hearing Impairment:  No hearing concerns    In the past 6 months, have you been bothered by leaking of urine?  No    In general, how would you rate your overall mental or emotional health?  Excellent      PHQ-2 Total Score: 0    Additional concerns today:  No    Do you feel safe in your environment? Yes    Have you ever done Advance Care Planning? (For example, a Health Directive, POLST, or a discussion with a medical provider or your loved ones about your wishes): Yes, advance care planning is on file.     Fall risk  Fallen 2 or more times in the past year?: No  Any fall with injury in the past year?: No    Cognitive Screening   1) Repeat 3 items (Leader, Season, Table)    2) Clock draw: NORMAL  3) 3 item recall: Recalls 3 objects  Results: 3 items recalled: COGNITIVE IMPAIRMENT LESS LIKELY    Mini-CogTM Copyright S Elizabeth. Licensed by the author for use in [facility]; reprinted with permission (marleen@.[email or web address]). All rights reserved.      Do you have sleep apnea, excessive snoring or daytime drowsiness?: no    Reviewed and updated as needed this visit by clinical staff  Tobacco  Allergies  Meds  Problems  Med Hx  Surg Hx  Fam Hx  Soc Hx          Reviewed and " updated as needed this visit by Provider  Tobacco  Allergies  Meds  Problems  Med Hx  Surg Hx  Fam Hx         Social History     Tobacco Use     Smoking status: Never Smoker     Smokeless tobacco: Never Used   Substance Use Topics     Alcohol use: Yes     Comment: social      If you drink alcohol do you typically have >3 drinks per day or >7 drinks per week? No    Alcohol Use 10/14/2021   Prescreen: >3 drinks/day or >7 drinks/week? -   Prescreen: >3 drinks/day or >7 drinks/week? No               Current providers sharing in care for this patient include:   Patient Care Team:  Riccardo Levy MD as PCP - General (Family Practice)  Riccardo Levy MD as Assigned PCP  Lulú Fish MD as Assigned Surgical Provider    The following health maintenance items are reviewed in Epic and correct as of today:  Health Maintenance Due   Topic Date Due     INFLUENZA VACCINE (1) 09/01/2021     FALL RISK ASSESSMENT  09/16/2021     Labs reviewed in EPIC  BP Readings from Last 3 Encounters:   10/14/21 136/82   03/22/21 118/69   09/16/20 134/84    Wt Readings from Last 3 Encounters:   10/14/21 78.6 kg (173 lb 3.2 oz)   09/16/20 76.4 kg (168 lb 8 oz)   06/20/18 81.2 kg (179 lb)                  Patient Active Problem List   Diagnosis     Varicose veins     CARDIOVASCULAR SCREENING; LDL GOAL LESS THAN 160     Erectile dysfunction, unspecified erectile dysfunction type     Peyronie's disease     Past Surgical History:   Procedure Laterality Date     COLONOSCOPY  12/01/08     COLONOSCOPY N/A 11/19/2015    Procedure: COLONOSCOPY;  Surgeon: Alonso Gaitan MD;  Location:  GI     COLONOSCOPY N/A 3/22/2021    Procedure: COLONOSCOPY, WITH POLYPECTOMY by forcep;  Surgeon: Jose Ni MD;  Location:  GI     ENDOSCOPIC LIGATION VEIN(S)       [facility] COLONOSCOPY THRU STOMA WITH BIOPSY  1996       Social History     Tobacco Use     Smoking status: Never Smoker     Smokeless tobacco: Never Used   Substance Use Topics      "Alcohol use: Yes     Comment: social      Family History   Problem Relation Age of Onset     Cancer - colorectal Father      Prostate Cancer Father      Heart Disease Father         by-pass surgery in 1993         No current outpatient medications on file.     Allergies   Allergen Reactions     No Known Drug Allergies      Recent Labs   Lab Test 10/11/21  0828 09/13/20  0817 01/13/15  0828 01/13/15  0828 11/25/13  0839 11/25/13  0839   LDL 96 110*  --  94   < > 120   HDL 82 79  --  49   < > 62   TRIG 57 61  --  75   < > 77   CR 1.12 1.09   < > 0.97   < > 1.03   GFRESTIMATED 67 69   < > 78   < > 74   GFRESTBLACK  --  81  --  >90   GFR Calc     < > 89   POTASSIUM 4.2 3.9   < > 3.6   < > 4.4   TSH  --   --   --   --   --  1.16    < > = values in this interval not displayed.              Review of Systems   Constitutional: Negative for chills and fever.   HENT: Negative for congestion, ear pain, hearing loss and sore throat.    Eyes: Negative for pain and visual disturbance.   Respiratory: Negative for cough and shortness of breath.    Cardiovascular: Negative for chest pain, palpitations and peripheral edema.   Gastrointestinal: Negative for abdominal pain, constipation, diarrhea, heartburn, hematochezia and nausea.   Genitourinary: Positive for impotence. Negative for discharge, dysuria, frequency, genital sores, hematuria and urgency.   Musculoskeletal: Negative for arthralgias, joint swelling and myalgias.   Skin: Negative for rash.   Neurological: Negative for dizziness, weakness, headaches and paresthesias.   Psychiatric/Behavioral: Negative for mood changes. The patient is not nervous/anxious.          OBJECTIVE:   /82 (BP Location: Right arm, Patient Position: Sitting, Cuff Size: Adult Regular)   Pulse 80   Temp 97.7  F (36.5  C) (Temporal)   Resp 16   Ht 1.778 m (5' 10\")   Wt 78.6 kg (173 lb 3.2 oz)   SpO2 99%   BMI 24.85 kg/m   Estimated body mass index is 24.85 kg/m  as calculated " "from the following:    Height as of this encounter: 1.778 m (5' 10\").    Weight as of this encounter: 78.6 kg (173 lb 3.2 oz).  Physical Exam  GENERAL: healthy, alert and no distress  EYES: Eyes grossly normal to inspection, PERRL and conjunctivae and sclerae normal  HENT: ear canals and TM's normal, nose and mouth without ulcers or lesions  NECK: no adenopathy, no asymmetry, masses, or scars and thyroid normal to palpation  RESP: lungs clear to auscultation - no rales, rhonchi or wheezes  CV: regular rate and rhythm, normal S1 S2, no S3 or S4, no murmur, click or rub, no peripheral edema and peripheral pulses strong  ABDOMEN: soft, nontender, no hepatosplenomegaly, no masses and bowel sounds normal  MS: no gross musculoskeletal defects noted, no edema  SKIN: no suspicious lesions or rashes  NEURO: Normal strength and tone, mentation intact and speech normal  PSYCH: mentation appears normal, affect normal/bright  LYMPH: no cervical, supraclavicular, axillary, or inguinal adenopathy    Diagnostic Test Results:  Labs reviewed in Epic    ASSESSMENT / PLAN:       ICD-10-CM    1. Need for prophylactic vaccination and inoculation against influenza  Z23 INFLUENZA, QUAD, HIGH DOSE, PF, 65YR + (FLUZONE HD)   2. Encounter for Medicare annual wellness exam  Z00.00        Patient has been advised of split billing requirements and indicates understanding: Yes  COUNSELING:  Reviewed preventive health counseling, as reflected in patient instructions       Regular exercise       Healthy diet/nutrition       Vision screening       Hearing screening       Immunizations    Vaccinated for: Influenza             Colon cancer screening       Prostate cancer screening    Estimated body mass index is 24.85 kg/m  as calculated from the following:    Height as of this encounter: 1.778 m (5' 10\").    Weight as of this encounter: 78.6 kg (173 lb 3.2 oz).        He reports that he has never smoked. He has never used smokeless " tobacco.      Appropriate preventive services were discussed with this patient, including applicable screening as appropriate for cardiovascular disease, diabetes, osteopenia/osteoporosis, and glaucoma.  As appropriate for age/gender, discussed screening for colorectal cancer, prostate cancer, breast cancer, and cervical cancer. Checklist reviewing preventive services available has been given to the patient.    Reviewed patients plan of care and provided an AVS. The Basic Care Plan (routine screening as documented in Health Maintenance) for Duane meets the Care Plan requirement. This Care Plan has been established and reviewed with the Patient.    Counseling Resources:  ATP IV Guidelines  Pooled Cohorts Equation Calculator  Breast Cancer Risk Calculator  Breast Cancer: Medication to Reduce Risk  FRAX Risk Assessment  ICSI Preventive Guidelines  Dietary Guidelines for Americans, 2010  [employer] MyPlate  ASA Prophylaxis  Lung CA Screening    Riccardo Levy MD  [facility]    Identified Health Risks:

## 2021-10-14 NOTE — PATIENT INSTRUCTIONS
Patient Education   Personalized Prevention Plan  You are due for the preventive services outlined below.  Your care team is available to assist you in scheduling these services.  If you have already completed any of these items, please share that information with your care team to update in your medical record.  Health Maintenance Due   Topic Date Due     Flu Vaccine (1) 09/01/2021     FALL RISK ASSESSMENT  09/16/2021       Exercise for a Healthier Heart  You may wonder how you can improve the health of your heart. If you re thinking about exercise, you re on the right track. You don t need to become an athlete. But you do need a certain amount of brisk exercise to help strengthen your heart. If you have been diagnosed with a heart condition, your healthcare provider may advise exercise to help stabilize your condition. To help make exercise a habit, choose safe, fun activities.      Exercise with a friend. When activity is fun, you're more likely to stick with it.   Before you start  Check with your healthcare provider before starting an exercise program. This is especially important if you have not been active for a while. It's also important if you have a long-term (chronic) health problem such as heart disease, diabetes, or obesity. Or if you are at high risk for having these problems.   Why exercise?  Exercising regularly offers many healthy rewards. It can help you do all of the following:     Improve your blood cholesterol level to help prevent further heart trouble    Lower your blood pressure to help prevent a stroke or heart attack    Control diabetes, or reduce your risk of getting this disease    Improve your heart and lung function    Reach and stay at a healthy weight    Make your muscles stronger so you can stay active    Prevent falls and fractures by slowing the loss of bone mass (osteoporosis)    Manage stress better    Reduce your blood pressure    Improve your sense of self and your body  image  Exercise tips      Ease into your routine. Set small goals. Then build on them. If you are not sure what your activity level should be, talk with your healthcare provider first before starting an exercise routine.    Exercise on most days. Aim for a total of 150 minutes (2 hours and 30 minutes) or more of moderate-intensity aerobic activity each week. Or 75 minutes (1 hour and 15 minutes) or more of vigorous-intensity aerobic activity each week. Or try for a combination of both. Moderate activity means that you breathe heavier and your heart rate increases but you can still talk. Think about doing 40 minutes of moderate exercise, 3 to 4 times a week. For best results, activity should last for about 40 minutes to lower blood pressure and cholesterol. It's OK to work up to the 40-minute period over time. Examples of moderate-intensity activity are walking 1 mile in 15 minutes. Or doing 30 to 45 minutes of yard work.    Step up your daily activity level.  Along with your exercise program, try being more active the whole day. Walk instead of drive. Or park further away so that you take more steps each day. Do more household tasks or yard work. You may not be able to meet the advised mount of physical activity. But doing some moderate- or vigorous-intensity aerobic activity can help reduce your risk for heart disease. Your healthcare provider can help you figure out what is best for you.    Choose 1 or more activities you enjoy.  Walking is one of the easiest things you can do. You can also try swimming, riding a bike, dancing, or taking an exercise class.    When to call your healthcare provider  Call your healthcare provider if you have any of these:     Chest pain or feel dizzy or lightheaded    Burning, tightness, pressure, or heaviness in your chest, neck, shoulders, back, or arms    Abnormal shortness of breath    More joint or muscle pain    A very fast or irregular heartbeat (palpitations)  [employer] last  reviewed this educational content on 7/1/2019 2000-2021 [employer]. All rights reserved. This information is not intended as a substitute for professional medical care. Always follow your healthcare professional's instructions.          Understanding USDA MyPlate  The USDA has guidelines to help you make healthy food choices. These are called MyPlate. MyPlate shows the food groups that make up healthy meals using the image of a place setting. Before you eat, think about the healthiest choices for what to put on your plate or in your cup or bowl. To learn more about building a healthy plate, visit www.choosemyplate.gov.    The food groups    Fruits. Any fruit or 100% fruit juice counts as part of the Fruit Group. Fruits may be fresh, canned, frozen, or dried, and may be whole, cut-up, or pureed. Make 1/2 of your plate fruits and vegetables.    Vegetables. Any vegetable or 100% vegetable juice counts as a member of the Vegetable Group. Vegetables may be fresh, frozen, canned, or dried. They can be served raw or cooked and may be whole, cut-up, or mashed. Make 1/2 of your plate fruits and vegetables.    Grains. All foods made from grains are part of the Grains Group. These include wheat, rice, oats, cornmeal, and barley. Grains are often used to make foods such as bread, pasta, oatmeal, cereal, tortillas, and grits. Grains should be no more than 1/4 of your plate. At least half of your grains should be whole grains.    Protein. This group includes meat, poultry, seafood, beans and peas, eggs, processed soy products (such as tofu), nuts (including nut butters), and seeds. Make protein choices no more than 1/4 of your plate. Meat and poultry choices should be lean or low fat.    Dairy. The Dairy Group includes all fluid milk products and foods made from milk that contain calcium, such as yogurt and cheese. (Foods that have little calcium, such as cream, butter, and cream cheese, are not part of this  group.) Most dairy choices should be low-fat or fat-free.    Oils. Oils aren't a food group, but they do contain essential nutrients. However it's important to watch your intake of oils. These are fats that are liquid at room temperature. They include canola, corn, olive, soybean, vegetable, and sunflower oil. Foods that are mainly oil include mayonnaise, certain salad dressings, and soft margarines. You likely already get your daily oil allowance from the foods you eat.  Things to limit  Eating healthy also means limiting these things in your diet:       Salt (sodium). Many processed foods have a lot of sodium. To keep sodium intake down, eat fresh vegetables, meats, poultry, and seafood when possible. Purchase low-sodium, reduced-sodium, or no-salt-added food products at the store. And don't add salt to your meals at home. Instead, season them with herbs and spices such as dill, oregano, cumin, and paprika. Or try adding flavor with lemon or lime zest and juice.    Saturated fat. Saturated fats are most often found in animal products such as beef, pork, and chicken. They are often solid at room temperature, such as butter. To reduce your saturated fat intake, choose leaner cuts of meat and poultry. And try healthier cooking methods such as grilling, broiling, roasting, or baking. For a simple lower-fat swap, use plain nonfat yogurt instead of mayonnaise when making potato salad or macaroni salad.    Added sugars. These are sugars added to foods. They are in foods such as ice cream, candy, soda, fruit drinks, sports drinks, energy drinks, cookies, pastries, jams, and syrups. Cut down on added sugars by sharing sweet treats with a family member or friend. You can also choose fruit for dessert, and drink water or other unsweetened beverages.     [employer] last reviewed this educational content on 6/1/2020 2000-2021 [employer]. All rights reserved. This information is not intended as a substitute for  professional medical care. Always follow your healthcare professional's instructions.

## 2022-03-31 NOTE — PROGRESS NOTES
[facility] Dermatology Note  Encounter Date: Apr 1, 2022  Office Visit     Dermatology Problem List:  Skin check 4/1/22, recommend yearly  0. NUB - left mid helix - bx 4/1/22  1. Actinic Keratosis   -s/p cryotherapy  2. History of atypical/dysplastic nevi  -Junctional dysplastic nevus with mild atypia, left scapula, bx 3/26/21  -Lentiginous compound melanocytic nevus with moderate to severe atypia, central posterior neck s/p biopsy 10/16/2019 s/p excision 12/02/2019  -Junctional nevus with atypical features, right (lateral) thigh, s/p excision 12/20/2016  -Junctional nevus with moderate dysplasia, lower paraspinal back, s/p biopsy 11/14/2016  -Junctional dysplastic nevus with mild atypia, left chest, s/p biopsy 4/10/2015  -Lentiginous compound melanocytic nevus with mild atypia, upper back, s/p biopsy 4/10/2015  -Patient reports two prior atypical lesions in 1980  3. Neurofibroma, upper mid back, s/p biopsy 11/9/2015  4. Folliculitis  5. Benign biopsies  - Macular seborrheic keratosis, left mid paraspinal back, bx 3/26/21    ____________________________________________    Assessment & Plan:    # History of atypical/dysplastic nevi   - Continue annual skin exams and report new or changing lesions     - ABCD's of melanoma were reviewed with patient and handout provided.    - Recommend sunscreens SPF #30 or greater, protective clothing and avoidance of tanning beds.      # 5 scaly papules with crusting on right knee, right shin (lower lesion which patient does not recall) and left knee - reported after a fall   - Resolved.    # Neoplasm of uncertain behavior on the left mid helix. The differential diagnosis includes AK vs SK.    - See procedure note.     # Actinic keratosis - right temple, right cheek, left cheek x 2, left temple   - See cryo procedure note.    Procedures Performed:   - Shave biopsy procedure note, location(s): left mid helix. After discussion of benefits and risks including but not  limited to bleeding, infection, scar, incomplete removal, recurrence, and non-diagnostic biopsy, written consent and photographs were obtained. The area was cleaned with isopropyl alcohol. 0.5mL of 1% lidocaine with epinephrine was injected to obtain adequate anesthesia of lesion(s). Shave biopsy at site(s) performed. Hemostasis was achieved with aluminium chloride. Petrolatum ointment and a sterile dressing were applied. The patient tolerated the procedure and no complications were noted. The patient was provided with verbal and written post care instructions.     - Cryotherapy procedure note, location(s): right temple, right cheek, left cheek x 2, left temple. After verbal consent and discussion of risks and benefits including, but not limited to, dyspigmentation/scar, blister, and pain, 5 AKs was(were) treated with 1-2 mm freeze border for 1-2 cycles with liquid nitrogen. Post cryotherapy instructions were provided.    Follow-up: pending path results, 1 year for a skin check, earlier for new or changing lesions    Staff and Scribe:     Scribe Disclosure:   I, Spencer Jacob, am serving as a scribe to document services personally performed by this physician, Dr. Lulú Fish, based on data collection and the provider's statements to me.     Provider Disclosure:   The documentation recorded by the scribe accurately reflects the services I personally performed and the decisions made by me.    Lulú Fish MD    Department of Dermatology  [facility]: Phone: [phone], Fax:[phone]  [facility]: Phone: [phone], Fax: [phone]      ____________________________________________    CC: Skin Check (FBSE. No area of concern. Hx of DN. No family hx of SC)    HPI:  Mr. Duane W Dahlstrom is a(n) 71 year old male who presents today as a return patient for a skin check.    Last seen  3/26/21. At that time, biopsies were taken from the left mid paraspinal back (macular seborrheic keratosis) and left scapula (junctional DN with mild atypia).  An AK was treated with cryo.    Today, he has no areas of concern. He does note a lesion on the left ear that has been crusty. This site has been treated with cryo in the past. This area has been crusting. Nothing bleeding, or changing.     Patient is otherwise feeling well, without additional skin concerns.    Labs Reviewed:  N/A    Physical Exam:  Vitals: There were no vitals taken for this visit.  SKIN: Total skin excluding the undergarment areas was performed. The exam included the head/face, neck, both arms, chest, back, abdomen, both legs, digits and/or nails. Declines genital  - Well healed scars in areas of past DN.  - 4 mm skin colored, scaly papule on the left mid helix  - There are erythematous macules with overyling adherent scale on the right temple, right cheek, left cheek x 2, left temple.    - No other lesions of concern on areas examined.     Medications:  No current outpatient medications on file.     No current facility-administered medications for this visit.      Past Medical History:   Patient Active Problem List   Diagnosis     Varicose veins     CARDIOVASCULAR SCREENING; LDL GOAL LESS THAN 160     Erectile dysfunction, unspecified erectile dysfunction type     Peyronie's disease     Past Medical History:   Diagnosis Date     Allergic rhinitis, cause unspecified     Seasonal rhinitis     Syncope and collapse 1998     Varicose veins of leg         CC No referring provider defined for this encounter. on close of this encounter.

## 2022-04-01 ENCOUNTER — OFFICE VISIT (OUTPATIENT)
Dept: DERMATOLOGY | Facility: CLINIC | Age: 72
End: 2022-04-01
Payer: COMMERCIAL

## 2022-04-01 DIAGNOSIS — D48.5 NEOPLASM OF UNCERTAIN BEHAVIOR OF SKIN: ICD-10-CM

## 2022-04-01 DIAGNOSIS — Z86.018 HISTORY OF DYSPLASTIC NEVUS: Primary | ICD-10-CM

## 2022-04-01 DIAGNOSIS — L57.0 ACTINIC KERATOSES: ICD-10-CM

## 2022-04-01 PROCEDURE — 88305 TISSUE EXAM BY PATHOLOGIST: CPT | Performed by: PATHOLOGY

## 2022-04-01 PROCEDURE — 17000 DESTRUCT PREMALG LESION: CPT | Mod: XS | Performed by: DERMATOLOGY

## 2022-04-01 PROCEDURE — 17003 DESTRUCT PREMALG LES 2-14: CPT | Performed by: DERMATOLOGY

## 2022-04-01 PROCEDURE — 11102 TANGNTL BX SKIN SINGLE LES: CPT | Performed by: DERMATOLOGY

## 2022-04-01 NOTE — PATIENT INSTRUCTIONS
Wound Care After a Biopsy    What is a skin biopsy?  A skin biopsy allows the doctor to examine a very small piece of tissue under the microscope to determine the diagnosis and the best treatment for the skin condition. A local anesthetic (numbing medicine)  is injected with a very small needle into the skin area to be tested. A small piece of skin is taken from the area. Sometimes a suture (stitch) is used.     What are the risks of a skin biopsy?  I will experience scar, bleeding, swelling, pain, crusting and redness. I may experience incomplete removal or recurrence. Risks of this procedure are excessive bleeding, bruising, infection, nerve damage, numbness, thick (hypertrophic or keloidal) scar and non-diagnostic biopsy.    How should I care for my wound for the first 24 hours?    Keep the wound dry and covered for 24 hours    If it bleeds, hold direct pressure on the area for 15 minutes. If bleeding does not stop then go to the emergency room    Avoid strenuous exercise the first 1-2 days or as your doctor instructs you    How should I care for the wound after 24 hours?    After 24 hours, remove the bandage    You may bathe or shower as normal    If you had a scalp biopsy, you can shampoo as usual and can use shower water to clean the biopsy site daily    Clean the wound twice a day with gentle soap and water    Do not scrub, be gentle    Apply white petroleum/Vaseline after cleaning the wound with a cotton swab or a clean finger, and keep the site covered with a Bandaid /bandage. Bandages are not necessary with a scalp biopsy    If you are unable to cover the site with a Bandaid /bandage, re-apply ointment 2-3 times a day to keep the site moist. Moisture will help with healing    Avoid strenuous activity for first 1-2 days    Avoid lakes, rivers, pools, and oceans until the stitches are removed or the site is healed    How do I clean my wound?    Wash hands thoroughly with soap or use hand  before all  wound care    Clean the wound with gentle soap and water    Apply white petroleum/Vaseline  to wound after it is clean    Replace the Bandaid /bandage to keep the wound covered for the first few days or as instructed by your doctor    If you had a scalp biopsy, warm shower water to the area on a daily basis should suffice    What should I use to clean my wound?     Cotton-tipped applicators (Qtips )    White petroleum jelly (Vaseline ). Use a clean new container and use Q-tips to apply.    Bandaids   as needed    Gentle soap     How should I care for my wound long term?    Do not get your wound dirty    Keep up with wound care for one week or until the area is healed.    A small scab will form and fall off by itself when the area is completely healed. The area will be red and will become pink in color as it heals. Sun protection is very important for how your scar will turn out. Sunscreen with an SPF 30 or greater is recommended once the area is healed.    You should have some soreness but it should be mild and slowly go away over several days. Talk to your doctor about using tylenol for pain,    When should I call my doctor?  If you have increased:     Pain or swelling    Pus or drainage (clear or slightly yellow drainage is ok)    Temperature over 100F    Spreading redness or warmth around wound    When will I hear about my results?  The biopsy results can take 2 weeks to come back.  Your results will automatically release to [employer] before your provider has even reviewed them.  The clinic will call you with the results, send you a yavalu message, or have you schedule a follow-up clinic or phone time to discuss the results.  Contact our clinics if you do not hear from us in 2 weeks.    Who should I call with questions?    [facility]: [phone]    [facility]: [phone]    For urgent needs outside of business hours call the [facility] [place] at  [phone] and ask for the dermatology resident on call    Cryotherapy    What is it?    Use of a very cold liquid, such as liquid nitrogen, to freeze and destroy abnormal skin cells that need to be removed    What should I expect?    Tenderness and redness    A small blister that might grow and fill with dark purple blood. There may be crusting.    More than one treatment may be needed if the lesions do not go away.    How do I care for the treated area?    Gently wash the area with your hands when bathing.    Use a thin layer of Vaseline to help with healing. You may use a Band-Aid.     The area should heal within 7-10 days and may leave behind a pink or lighter color.     Do not use an antibiotic or Neosporin ointment.     You may take acetaminophen (Tylenol) for pain.     Call your doctor if you have:    Severe pain    Signs of infection (warmth, redness, cloudy yellow drainage, and or a bad smell)    Questions or concerns    Who should I call with questions?       [facility]: [phone]       [facility]: [phone]       For urgent needs outside of business hours call the [facility] at [phone] and ask for the dermatology resident on call

## 2022-04-01 NOTE — NURSING NOTE
The following medication was given:     MEDICATION:  Lidocaine with epinephrine 1% [id number]  ROUTE: SQ  SITE: see procedure note  DOSE: see procedure note  LOT #: -EV  : Crystal  EXPIRATION DATE: 6/1/22  NDC#: 8048-5707-92  Was there drug waste?   Multi-dose vial: Yes        Nicol Ortiz on 4/1/2022 at 1:30 PM

## 2022-04-01 NOTE — NURSING NOTE
Duane W Dahlstrom's goals for this visit include:   Chief Complaint   Patient presents with     Skin Check     FBSE. No area of concern. Hx of DN. No family hx of SC       He requests these members of his care team be copied on today's visit information:     PCP: Riccardo Levy    Referring Provider:  No referring provider defined for this encounter.    There were no vitals taken for this visit.    Do you need any medication refills at today's visit? Uma Ortiz on 4/1/2022 at 9:22 AM

## 2022-04-01 NOTE — LETTER
4/1/2022         RE: Duane W Dahlstrom  [address]  [facility] [address]        Dear Colleague,    Thank you for referring your patient, Duane W Dahlstrom, to the [facility]. Please see a copy of my visit note below.    [facility] Dermatology Note  Encounter Date: Apr 1, 2022  Office Visit     Dermatology Problem List:  Skin check 4/1/22, recommend yearly  0. NUB - left mid helix - bx 4/1/22  1. Actinic Keratosis   -s/p cryotherapy  2. History of atypical/dysplastic nevi  -Junctional dysplastic nevus with mild atypia, left scapula, bx 3/26/21  -Lentiginous compound melanocytic nevus with moderate to severe atypia, central posterior neck s/p biopsy 10/16/2019 s/p excision 12/02/2019  -Junctional nevus with atypical features, right (lateral) thigh, s/p excision 12/20/2016  -Junctional nevus with moderate dysplasia, lower paraspinal back, s/p biopsy 11/14/2016  -Junctional dysplastic nevus with mild atypia, left chest, s/p biopsy 4/10/2015  -Lentiginous compound melanocytic nevus with mild atypia, upper back, s/p biopsy 4/10/2015  -Patient reports two prior atypical lesions in 1980  3. Neurofibroma, upper mid back, s/p biopsy 11/9/2015  4. Folliculitis  5. Benign biopsies  - Macular seborrheic keratosis, left mid paraspinal back, bx 3/26/21    ____________________________________________    Assessment & Plan:    # History of atypical/dysplastic nevi   - Continue annual skin exams and report new or changing lesions     - ABCD's of melanoma were reviewed with patient and handout provided.    - Recommend sunscreens SPF #30 or greater, protective clothing and avoidance of tanning beds.      # 5 scaly papules with crusting on right knee, right shin (lower lesion which patient does not recall) and left knee - reported after a fall   - Resolved.    # Neoplasm of uncertain behavior on the left mid helix. The differential diagnosis includes AK vs SK.    - See procedure note.      # Actinic keratosis - right temple, right cheek, left cheek x 2, left temple   - See cryo procedure note.    Procedures Performed:   - Shave biopsy procedure note, location(s): left mid helix. After discussion of benefits and risks including but not limited to bleeding, infection, scar, incomplete removal, recurrence, and non-diagnostic biopsy, written consent and photographs were obtained. The area was cleaned with isopropyl alcohol. 0.5mL of 1% lidocaine with epinephrine was injected to obtain adequate anesthesia of lesion(s). Shave biopsy at site(s) performed. Hemostasis was achieved with aluminium chloride. Petrolatum ointment and a sterile dressing were applied. The patient tolerated the procedure and no complications were noted. The patient was provided with verbal and written post care instructions.     - Cryotherapy procedure note, location(s): right temple, right cheek, left cheek x 2, left temple. After verbal consent and discussion of risks and benefits including, but not limited to, dyspigmentation/scar, blister, and pain, 5 AKs was(were) treated with 1-2 mm freeze border for 1-2 cycles with liquid nitrogen. Post cryotherapy instructions were provided.    Follow-up: pending path results, 1 year for a skin check, earlier for new or changing lesions    Staff and Scribe:     Scribe Disclosure:   I, Spencer Jacob, am serving as a scribe to document services personally performed by this physician, Dr. Lulú Fish, based on data collection and the provider's statements to me.     Provider Disclosure:   The documentation recorded by the scribe accurately reflects the services I personally performed and the decisions made by me.    Lulú Fish MD    Department of Dermatology  [facility]: Phone: [phone], Fax:[phone]  [facility]: Phone: [phone], Fax:  [phone]      ____________________________________________    CC: Skin Check (FBSE. No area of concern. Hx of DN. No family hx of SC)    HPI:  Mr. Duane W Dahlstrom is a(n) 71 year old male who presents today as a return patient for a skin check.    Last seen 3/26/21. At that time, biopsies were taken from the left mid paraspinal back (macular seborrheic keratosis) and left scapula (junctional DN with mild atypia).  An AK was treated with cryo.    Today, he has no areas of concern. He does note a lesion on the left ear that has been crusty. This site has been treated with cryo in the past. This area has been crusting. Nothing bleeding, or changing.     Patient is otherwise feeling well, without additional skin concerns.    Labs Reviewed:  N/A    Physical Exam:  Vitals: There were no vitals taken for this visit.  SKIN: Total skin excluding the undergarment areas was performed. The exam included the head/face, neck, both arms, chest, back, abdomen, both legs, digits and/or nails. Declines genital  - Well healed scars in areas of past DN.  - 4 mm skin colored, scaly papule on the left mid helix  - There are erythematous macules with overyling adherent scale on the right temple, right cheek, left cheek x 2, left temple.    - No other lesions of concern on areas examined.     Medications:  No current outpatient medications on file.     No current facility-administered medications for this visit.      Past Medical History:   Patient Active Problem List   Diagnosis     Varicose veins     CARDIOVASCULAR SCREENING; LDL GOAL LESS THAN 160     Erectile dysfunction, unspecified erectile dysfunction type     Peyronie's disease     Past Medical History:   Diagnosis Date     Allergic rhinitis, cause unspecified     Seasonal rhinitis     Syncope and collapse 1998     Varicose veins of leg         CC No referring provider defined for this encounter. on close of this encounter.      Again, thank you for allowing me to participate  in the care of your patient.        Sincerely,        Lulú Fish MD

## 2022-04-05 ENCOUNTER — TELEPHONE (OUTPATIENT)
Dept: DERMATOLOGY | Facility: CLINIC | Age: 72
End: 2022-04-05
Payer: COMMERCIAL

## 2022-04-05 LAB
PATH REPORT.COMMENTS IMP SPEC: NORMAL
PATH REPORT.COMMENTS IMP SPEC: NORMAL
PATH REPORT.FINAL DX SPEC: NORMAL
PATH REPORT.GROSS SPEC: NORMAL
PATH REPORT.MICROSCOPIC SPEC OTHER STN: NORMAL
PATH REPORT.RELEVANT HX SPEC: NORMAL

## 2022-04-05 NOTE — TELEPHONE ENCOUNTER
"Associated Results      Dermatological Path Order and Indications: [id number]  Order: [id number]   Status: Final result     Visible to patient: Yes (not seen)     Dx: Neoplasm of uncertain behavior of skin     2 Result Notes    Component  Resulting Agency   Case Report   Surgical Pathology Report                         Case: [id number]                                   Authorizing Provider:  Lulú Fish MD           Collected:           04/01/2022 09:37 AM           Ordering Location:     [facility]   Received:            04/01/2022 02:41 PM                                  [place]                                                                   Pathologist:           Blair Hester MD                                                       Specimen:    Skin, Left mid helix                                                                       Final Diagnosis   A(1).  Skin, left mid helix, shave:  - Actinic keratosis - (see description)      Electronically signed by Blair Hester MD on 4/5/2022 at 12:09 PM   Clinical Information  [facility]   The patient is a 71 year old male.    Gross Description  [facility]   A(1). Skin, Left mid helix:  The specimen is received in formalin with proper patient identification, labeled \"L mid helix\".  The specimen consists of a 0.5 x 0.2 cm white-tan skin shave remarkable for diffuse brown-tan discoloration.  The subcutaneous surface is inked blue, and the specimen is bisected and entirely submitted in cassette A1.                   Microscopic Description  [facility]   The specimen exhibits intermittent parakeratosis with follicular sparing, and mild epidermal hyperplasia with mild keratinocyte atypia accentuated in its lower half.    Performing Labs  [facility]   The technical component of this testing was completed at [facility]              Specimen Collected: 04/01/22  9:37 AM Last Resulted: 04/05/22 " 12:09 PM        Order Details      View Encounter      Lab and Collection Details      Routing      Result History             Scans on Order [id number]    Document on 4/5/2022 12:09 PM by Blair Hester MD         Result Care Coordination        Result Notes     Milady Pereyra LPN   4/5/2022  1:25 PM CDT Back to Top        Writer called and spoke with patient and reviewed results. Patient verbalized understanding. Cryo follow up scheduled on 726/22 at 1:15pm.     MATEUS Solo MD   4/5/2022  1:12 PM CDT         Precancer, please schedule cryo within 6 months. This Is on the ear.   Thanks           Milady Pereyra LPN

## 2022-07-21 NOTE — PROGRESS NOTES
[facility] Dermatology Note  Encounter Date: Jul 26, 2022  Office Visit     Dermatology Problem List:  Skin check 4/1/22, recommend yearly  1. Actinic Keratosis   AK - left mid helix - bx 4/1/22  -s/p cryotherapy  2. History of atypical/dysplastic nevi  -Junctional dysplastic nevus with mild atypia, left scapula, bx 3/26/21  -Lentiginous compound melanocytic nevus with moderate to severe atypia, central posterior neck s/p biopsy 10/16/2019 s/p excision 12/02/2019  -Junctional nevus with atypical features, right (lateral) thigh, s/p excision 12/20/2016  -Junctional nevus with moderate dysplasia, lower paraspinal back, s/p biopsy 11/14/2016  -Junctional dysplastic nevus with mild atypia, left chest, s/p biopsy 4/10/2015  -Lentiginous compound melanocytic nevus with mild atypia, upper back, s/p biopsy 4/10/2015  -Patient reports two prior atypical lesions in 1980  3. Neurofibroma, upper mid back, s/p biopsy 11/9/2015  4. Folliculitis  5. Benign biopsies  - Macular seborrheic keratosis, left mid paraspinal back, bx 3/26/21  ____________________________________________    Assessment & Plan:    # Actinic keratosis - left mid helix - bx 4/1/22  - See cryo note.       Procedures Performed:   - Cryotherapy procedure note, location(s): left mid helix. After verbal consent and discussion of risks and benefits including, but not limited to, dyspigmentation/scar, blister, and pain, 1 lesion(s) was(were) treated with 1-2 mm freeze border for 1-2 cycles with liquid nitrogen. Post cryotherapy instructions were provided.      Follow-up: 4/2023 for a skin check.     Staff and Scribe:     Scribe Disclosure:   Jos WARE, am serving as a scribe to document services personally performed by this physician, Dr. Lulú Fish, based on data collection and the provider's statements to me.     Provider Disclosure:   The documentation recorded by the scribe accurately reflects the services I personally performed and the  decisions made by me.    Lulú Fish MD    Department of Dermatology  [facility]: Phone: [phone], Fax:[phone]  [facility]: Phone: [phone], Fax: [phone]      ____________________________________________    CC: Actinic Keratosis (Cryo on left mid helix)    HPI:  Mr. Duane W Dahlstrom is a(n) 71 year old male who presents today as a return patient for a spot check.     Last seen on 4/1/22 for a skin check. At that time, AKs were treated with cryo. A bx was performed on the left mid helix.     Today, patient presents for cryo on the left helix.     Patient is otherwise feeling well, without additional skin concerns.    Labs Reviewed:  A(1).  Skin, left mid helix, shave:  - Actinic keratosis - (see description)       Physical Exam:  Vitals: There were no vitals taken for this visit.  SKIN: Focused examination of left helix was performed.  - There is an erythematous macule with overyling adherent scale on the left mid helix..   - No other lesions of concern on areas examined.     Medications:  No current outpatient medications on file.     No current facility-administered medications for this visit.      Past Medical History:   Patient Active Problem List   Diagnosis     Varicose veins     CARDIOVASCULAR SCREENING; LDL GOAL LESS THAN 160     Erectile dysfunction, unspecified erectile dysfunction type     Peyronie's disease     History of dysplastic nevus     Past Medical History:   Diagnosis Date     Allergic rhinitis, cause unspecified     Seasonal rhinitis     Syncope and collapse 1998     Varicose veins of leg         CC No referring provider defined for this encounter. on close of this encounter.

## 2022-07-26 ENCOUNTER — OFFICE VISIT (OUTPATIENT)
Dept: DERMATOLOGY | Facility: CLINIC | Age: 72
End: 2022-07-26
Payer: COMMERCIAL

## 2022-07-26 DIAGNOSIS — L57.0 ACTINIC KERATOSIS: Primary | ICD-10-CM

## 2022-07-26 PROCEDURE — 17000 DESTRUCT PREMALG LESION: CPT | Performed by: DERMATOLOGY

## 2022-07-26 ASSESSMENT — PAIN SCALES - GENERAL: PAINLEVEL: NO PAIN (0)

## 2022-07-26 NOTE — LETTER
7/26/2022         RE: Duane W Dahlstrom  [address]  [facility] [address]        Dear Colleague,    Thank you for referring your patient, Duane W Dahlstrom, to the [facility]. Please see a copy of my visit note below.    [facility] Dermatology Note  Encounter Date: Jul 26, 2022  Office Visit     Dermatology Problem List:  Skin check 4/1/22, recommend yearly  1. Actinic Keratosis   AK - left mid helix - bx 4/1/22  -s/p cryotherapy  2. History of atypical/dysplastic nevi  -Junctional dysplastic nevus with mild atypia, left scapula, bx 3/26/21  -Lentiginous compound melanocytic nevus with moderate to severe atypia, central posterior neck s/p biopsy 10/16/2019 s/p excision 12/02/2019  -Junctional nevus with atypical features, right (lateral) thigh, s/p excision 12/20/2016  -Junctional nevus with moderate dysplasia, lower paraspinal back, s/p biopsy 11/14/2016  -Junctional dysplastic nevus with mild atypia, left chest, s/p biopsy 4/10/2015  -Lentiginous compound melanocytic nevus with mild atypia, upper back, s/p biopsy 4/10/2015  -Patient reports two prior atypical lesions in 1980  3. Neurofibroma, upper mid back, s/p biopsy 11/9/2015  4. Folliculitis  5. Benign biopsies  - Macular seborrheic keratosis, left mid paraspinal back, bx 3/26/21  ____________________________________________    Assessment & Plan:    # Actinic keratosis - left mid helix - bx 4/1/22  - See cryo note.       Procedures Performed:   - Cryotherapy procedure note, location(s): left mid helix. After verbal consent and discussion of risks and benefits including, but not limited to, dyspigmentation/scar, blister, and pain, 1 lesion(s) was(were) treated with 1-2 mm freeze border for 1-2 cycles with liquid nitrogen. Post cryotherapy instructions were provided.      Follow-up: 4/2023 for a skin check.     Staff and Scribe:     Scribe Disclosure:   Jos WARE, am serving as a scribe to document  services personally performed by this physician, Dr. Lulú Fish, based on data collection and the provider's statements to me.     Provider Disclosure:   The documentation recorded by the scribe accurately reflects the services I personally performed and the decisions made by me.    Lulú Fish MD    Department of Dermatology  [facility]: Phone: [phone], Fax:[phone]  [facility]: Phone: [phone], Fax: [phone]      ____________________________________________    CC: Actinic Keratosis (Cryo on left mid helix)    HPI:  Mr. Duane W Dahlstrom is a(n) 71 year old male who presents today as a return patient for a spot check.     Last seen on 4/1/22 for a skin check. At that time, AKs were treated with cryo. A bx was performed on the left mid helix.     Today, patient presents for cryo on the left helix.     Patient is otherwise feeling well, without additional skin concerns.    Labs Reviewed:  A(1).  Skin, left mid helix, shave:  - Actinic keratosis - (see description)       Physical Exam:  Vitals: There were no vitals taken for this visit.  SKIN: Focused examination of left helix was performed.  - There is an erythematous macule with overyling adherent scale on the left mid helix..   - No other lesions of concern on areas examined.     Medications:  No current outpatient medications on file.     No current facility-administered medications for this visit.      Past Medical History:   Patient Active Problem List   Diagnosis     Varicose veins     CARDIOVASCULAR SCREENING; LDL GOAL LESS THAN 160     Erectile dysfunction, unspecified erectile dysfunction type     Peyronie's disease     History of dysplastic nevus     Past Medical History:   Diagnosis Date     Allergic rhinitis, cause unspecified     Seasonal rhinitis     Syncope and collapse 1998     Varicose veins of leg          CC No referring provider defined for this encounter. on close of this encounter.      Again, thank you for allowing me to participate in the care of your patient.        Sincerely,        Lulú Fish MD

## 2022-07-26 NOTE — NURSING NOTE
Duane W Dahlstrom's chief complaint for this visit includes:  Chief Complaint   Patient presents with     Actinic Keratosis     Cryo on left mid helix     PCP: Riccardo Levy    Referring Provider:  No referring provider defined for this encounter.    There were no vitals taken for this visit.  No Pain (0)        Allergies   Allergen Reactions     No Known Drug Allergies          Do you need any medication refills at today's visit? No    Taty Still CMA

## 2022-10-06 ENCOUNTER — MYC MEDICAL ADVICE (OUTPATIENT)
Dept: FAMILY MEDICINE | Facility: CLINIC | Age: 72
End: 2022-10-06

## 2022-10-06 ASSESSMENT — ENCOUNTER SYMPTOMS
JOINT SWELLING: 0
PALPITATIONS: 0
PARESTHESIAS: 0
CHILLS: 0
HEARTBURN: 0
ABDOMINAL PAIN: 0
EYE PAIN: 0
DIARRHEA: 0
SORE THROAT: 0
MYALGIAS: 0
HEMATURIA: 0
WEAKNESS: 0
SHORTNESS OF BREATH: 0
COUGH: 0
NERVOUS/ANXIOUS: 0
DYSURIA: 0
CONSTIPATION: 0
NAUSEA: 0
ARTHRALGIAS: 0
FEVER: 0
DIZZINESS: 0
HEADACHES: 0
HEMATOCHEZIA: 0
FREQUENCY: 0

## 2022-10-06 ASSESSMENT — ACTIVITIES OF DAILY LIVING (ADL): CURRENT_FUNCTION: NO ASSISTANCE NEEDED

## 2022-10-09 ENCOUNTER — HEALTH MAINTENANCE LETTER (OUTPATIENT)
Age: 72
End: 2022-10-09

## 2022-10-13 ENCOUNTER — OFFICE VISIT (OUTPATIENT)
Dept: FAMILY MEDICINE | Facility: CLINIC | Age: 72
End: 2022-10-13
Payer: COMMERCIAL

## 2022-10-13 VITALS
RESPIRATION RATE: 16 BRPM | BODY MASS INDEX: 24.21 KG/M2 | HEIGHT: 70 IN | DIASTOLIC BLOOD PRESSURE: 78 MMHG | WEIGHT: 169.1 LBS | OXYGEN SATURATION: 98 % | SYSTOLIC BLOOD PRESSURE: 138 MMHG | HEART RATE: 85 BPM | TEMPERATURE: 97.6 F

## 2022-10-13 DIAGNOSIS — Z00.00 ENCOUNTER FOR MEDICARE ANNUAL WELLNESS EXAM: Primary | ICD-10-CM

## 2022-10-13 PROCEDURE — G0439 PPPS, SUBSEQ VISIT: HCPCS | Performed by: FAMILY MEDICINE

## 2022-10-13 ASSESSMENT — ENCOUNTER SYMPTOMS
PALPITATIONS: 0
SHORTNESS OF BREATH: 0
HEMATURIA: 0
FEVER: 0
ARTHRALGIAS: 0
HEMATOCHEZIA: 0
HEARTBURN: 0
EYE PAIN: 0
HEADACHES: 0
WEAKNESS: 0
COUGH: 0
DYSURIA: 0
FREQUENCY: 0
NAUSEA: 0
SORE THROAT: 0
JOINT SWELLING: 0
CHILLS: 0
DIZZINESS: 0
PARESTHESIAS: 0
NERVOUS/ANXIOUS: 0
ABDOMINAL PAIN: 0
DIARRHEA: 0
MYALGIAS: 0
CONSTIPATION: 0

## 2022-10-13 ASSESSMENT — PAIN SCALES - GENERAL: PAINLEVEL: NO PAIN (0)

## 2022-10-13 ASSESSMENT — ACTIVITIES OF DAILY LIVING (ADL): CURRENT_FUNCTION: NO ASSISTANCE NEEDED

## 2022-10-13 NOTE — PROGRESS NOTES
"SUBJECTIVE:   Duane is a 71 year old who presents for Preventive Visit.      Patient has been advised of split billing requirements and indicates understanding: Yes  Are you in the first 12 months of your Medicare coverage?  No    Healthy Habits:     In general, how would you rate your overall health?  Excellent    Frequency of exercise:  None    Do you usually eat at least 4 servings of fruit and vegetables a day, include whole grains    & fiber and avoid regularly eating high fat or \"junk\" foods?  No    Taking medications regularly:  Yes    Medication side effects:  Not applicable    Ability to successfully perform activities of daily living:  No assistance needed    Home Safety:  No safety concerns identified    Hearing Impairment:  No hearing concerns    In the past 6 months, have you been bothered by leaking of urine?  No    In general, how would you rate your overall mental or emotional health?  Excellent      PHQ-2 Total Score: 0    Additional concerns today:  No    Do you feel safe in your environment? { :524342}    Have you ever done Advance Care Planning? (For example, a Health Directive, POLST, or a discussion with a medical provider or your loved ones about your wishes):        Fall risk  Fallen 2 or more times in the past year?: No  Any fall with injury in the past year?: No    Cognitive Screening { :053397}    {Do you have sleep apnea, excessive snoring or daytime drowsiness? (Optional):115655}    Reviewed and updated as needed this visit by clinical staff                  Reviewed and updated as needed this visit by Provider                 Social History     Tobacco Use     Smoking status: Never     Smokeless tobacco: Never   Substance Use Topics     Alcohol use: Yes     Comment: social          Alcohol Use 10/6/2022   Prescreen: >3 drinks/day or >7 drinks/week? No   Prescreen: >3 drinks/day or >7 drinks/week? -           {additional problems to add (Optional):629732}    Current providers sharing in " care for this patient include: {Rooming staff:  Please update Care Team in Rooming Activity, refresh this note and then delete this statement}  Patient Care Team:  Riccardo Levy MD as PCP - General (Family Practice)  Riccardo Levy MD as Assigned PCP  Lulú Fish MD as Assigned Surgical Provider    The following health maintenance items are reviewed in Epic and correct as of today:  Health Maintenance   Topic Date Due     HEPATITIS B IMMUNIZATION (1 of 3 - 3-dose series) Never done     COVID-19 Vaccine (5 - Booster for Moderna series) 07/11/2022     INFLUENZA VACCINE (1) 09/01/2022     MEDICARE ANNUAL WELLNESS VISIT  10/14/2022     ANNUAL REVIEW OF HM ORDERS  10/14/2022     FALL RISK ASSESSMENT  10/13/2023     COLORECTAL CANCER SCREENING  03/22/2026     LIPID  10/11/2026     ADVANCE CARE PLANNING  10/14/2026     DTAP/TDAP/TD IMMUNIZATION (5 - Td or Tdap) 10/12/2030     HEPATITIS C SCREENING  Completed     PHQ-2 (once per calendar year)  Completed     Pneumococcal Vaccine: 65+ Years  Completed     ZOSTER IMMUNIZATION  Completed     AORTIC ANEURYSM SCREENING (SYSTEM ASSIGNED)  Completed     IPV IMMUNIZATION  Aged Out     MENINGITIS IMMUNIZATION  Aged Out     {Chronicprobdata (optional):680666}  {Decision Support (Optional):381867}        Review of Systems   Constitutional: Negative for chills and fever.   HENT: Negative for congestion, ear pain, hearing loss and sore throat.    Eyes: Negative for pain and visual disturbance.   Respiratory: Negative for cough and shortness of breath.    Cardiovascular: Negative for chest pain, palpitations and peripheral edema.   Gastrointestinal: Negative for abdominal pain, constipation, diarrhea, heartburn, hematochezia and nausea.   Genitourinary: Negative for dysuria, frequency, genital sores, hematuria, impotence, penile discharge and urgency.   Musculoskeletal: Negative for arthralgias, joint swelling and myalgias.   Skin: Negative for rash.   Neurological: Negative for  "dizziness, weakness, headaches and paresthesias.   Psychiatric/Behavioral: Negative for mood changes. The patient is not nervous/anxious.      {ROS COMP (Optional):451903}    OBJECTIVE:   There were no vitals taken for this visit. Estimated body mass index is 24.85 kg/m  as calculated from the following:    Height as of 10/14/21: 1.778 m (5' 10\").    Weight as of 10/14/21: 78.6 kg (173 lb 3.2 oz).  Physical Exam  {Exam (Optional) :671703}    {Diagnostic Test Results (Optional):007984::\"Diagnostic Test Results:\",\"Labs reviewed in Epic\"}    ASSESSMENT / PLAN:   {Diag Picklist:291839}    {Patient advised of split billing (Optional):096765}    COUNSELING:  {Medicare Counselin}    Estimated body mass index is 24.85 kg/m  as calculated from the following:    Height as of 10/14/21: 1.778 m (5' 10\").    Weight as of 10/14/21: 78.6 kg (173 lb 3.2 oz).    {Weight Management Plan (ACO) Complete if BMI is abnormal-  Ages 18-64  BMI >24.9.  Age 65+ with BMI <23 or >30 (Optional):579804}    He reports that he has never smoked. He has never used smokeless tobacco.      Appropriate preventive services were discussed with this patient, including applicable screening as appropriate for cardiovascular disease, diabetes, osteopenia/osteoporosis, and glaucoma.  As appropriate for age/gender, discussed screening for colorectal cancer, prostate cancer, breast cancer, and cervical cancer. Checklist reviewing preventive services available has been given to the patient.    Reviewed patients plan of care and provided an AVS. The {CarePlan:166650} for Duane meets the Care Plan requirement. This Care Plan has been established and reviewed with the {PATIENT, FAMILY MEMBER, CAREGIVER:997354}.    Counseling Resources:  ATP IV Guidelines  Pooled Cohorts Equation Calculator  Breast Cancer Risk Calculator  Breast Cancer: Medication to Reduce Risk  FRAX Risk Assessment  ICSI Preventive Guidelines  Dietary Guidelines for Americans, 2010  USDA's " MyPlate  ASA Prophylaxis  Lung CA Screening    Riccardo Levy MD  [facility]    Identified Health Risks:

## 2022-10-13 NOTE — PATIENT INSTRUCTIONS
Patient Education   Personalized Prevention Plan  You are due for the preventive services outlined below.  Your care team is available to assist you in scheduling these services.  If you have already completed any of these items, please share that information with your care team to update in your medical record.  Health Maintenance Due   Topic Date Due     Hepatitis B Vaccine (1 of 3 - 3-dose series) Never done     COVID-19 Vaccine (5 - Booster for Moderna series) 07/11/2022     Flu Vaccine (1) 09/01/2022       Exercise for a Healthier Heart  You may wonder how you can improve the health of your heart. If you re thinking about exercise, you re on the right track. You don t need to become an athlete. But you do need a certain amount of brisk exercise to help strengthen your heart. If you have been diagnosed with a heart condition, your healthcare provider may advise exercise to help stabilize your condition. To help make exercise a habit, choose safe, fun activities.      Exercise with a friend. When activity is fun, you're more likely to stick with it.   Before you start  Check with your healthcare provider before starting an exercise program. This is especially important if you have not been active for a while. It's also important if you have a long-term (chronic) health problem such as heart disease, diabetes, or obesity. Or if you are at high risk for having these problems.   Why exercise?  Exercising regularly offers many healthy rewards. It can help you do all of the following:     Improve your blood cholesterol level to help prevent further heart trouble    Lower your blood pressure to help prevent a stroke or heart attack    Control diabetes, or reduce your risk of getting this disease    Improve your heart and lung function    Reach and stay at a healthy weight    Make your muscles stronger so you can stay active    Prevent falls and fractures by slowing the loss of bone mass (osteoporosis)    Manage  stress better    Reduce your blood pressure    Improve your sense of self and your body image  Exercise tips      Ease into your routine. Set small goals. Then build on them. If you are not sure what your activity level should be, talk with your healthcare provider first before starting an exercise routine.    Exercise on most days. Aim for a total of 150 minutes (2 hours and 30 minutes) or more of moderate-intensity aerobic activity each week. Or 75 minutes (1 hour and 15 minutes) or more of vigorous-intensity aerobic activity each week. Or try for a combination of both. Moderate activity means that you breathe heavier and your heart rate increases but you can still talk. Think about doing 40 minutes of moderate exercise, 3 to 4 times a week. For best results, activity should last for about 40 minutes to lower blood pressure and cholesterol. It's OK to work up to the 40-minute period over time. Examples of moderate-intensity activity are walking 1 mile in 15 minutes. Or doing 30 to 45 minutes of yard work.    Step up your daily activity level.  Along with your exercise program, try being more active the whole day. Walk instead of drive. Or park further away so that you take more steps each day. Do more household tasks or yard work. You may not be able to meet the advised mount of physical activity. But doing some moderate- or vigorous-intensity aerobic activity can help reduce your risk for heart disease. Your healthcare provider can help you figure out what is best for you.    Choose 1 or more activities you enjoy.  Walking is one of the easiest things you can do. You can also try swimming, riding a bike, dancing, or taking an exercise class.    When to call your healthcare provider  Call your healthcare provider if you have any of these:     Chest pain or feel dizzy or lightheaded    Burning, tightness, pressure, or heaviness in your chest, neck, shoulders, back, or arms    Abnormal shortness of breath    More  joint or muscle pain    A very fast or irregular heartbeat (palpitations)  [employer] last reviewed this educational content on 7/1/2019 2000-2021 [employer]. All rights reserved. This information is not intended as a substitute for professional medical care. Always follow your healthcare professional's instructions.          Understanding USDA MyPlate  The USDA has guidelines to help you make healthy food choices. These are called MyPlate. MyPlate shows the food groups that make up healthy meals using the image of a place setting. Before you eat, think about the healthiest choices for what to put on your plate or in your cup or bowl. To learn more about building a healthy plate, visit www.choosemyplate.gov.    The food groups    Fruits. Any fruit or 100% fruit juice counts as part of the Fruit Group. Fruits may be fresh, canned, frozen, or dried, and may be whole, cut-up, or pureed. Make 1/2 of your plate fruits and vegetables.    Vegetables. Any vegetable or 100% vegetable juice counts as a member of the Vegetable Group. Vegetables may be fresh, frozen, canned, or dried. They can be served raw or cooked and may be whole, cut-up, or mashed. Make 1/2 of your plate fruits and vegetables.    Grains. All foods made from grains are part of the Grains Group. These include wheat, rice, oats, cornmeal, and barley. Grains are often used to make foods such as bread, pasta, oatmeal, cereal, tortillas, and grits. Grains should be no more than 1/4 of your plate. At least half of your grains should be whole grains.    Protein. This group includes meat, poultry, seafood, beans and peas, eggs, processed soy products (such as tofu), nuts (including nut butters), and seeds. Make protein choices no more than 1/4 of your plate. Meat and poultry choices should be lean or low fat.    Dairy. The Dairy Group includes all fluid milk products and foods made from milk that contain calcium, such as yogurt and cheese. (Foods  that have little calcium, such as cream, butter, and cream cheese, are not part of this group.) Most dairy choices should be low-fat or fat-free.    Oils. Oils aren't a food group, but they do contain essential nutrients. However it's important to watch your intake of oils. These are fats that are liquid at room temperature. They include canola, corn, olive, soybean, vegetable, and sunflower oil. Foods that are mainly oil include mayonnaise, certain salad dressings, and soft margarines. You likely already get your daily oil allowance from the foods you eat.  Things to limit  Eating healthy also means limiting these things in your diet:       Salt (sodium). Many processed foods have a lot of sodium. To keep sodium intake down, eat fresh vegetables, meats, poultry, and seafood when possible. Purchase low-sodium, reduced-sodium, or no-salt-added food products at the store. And don't add salt to your meals at home. Instead, season them with herbs and spices such as dill, oregano, cumin, and paprika. Or try adding flavor with lemon or lime zest and juice.    Saturated fat. Saturated fats are most often found in animal products such as beef, pork, and chicken. They are often solid at room temperature, such as butter. To reduce your saturated fat intake, choose leaner cuts of meat and poultry. And try healthier cooking methods such as grilling, broiling, roasting, or baking. For a simple lower-fat swap, use plain nonfat yogurt instead of mayonnaise when making potato salad or macaroni salad.    Added sugars. These are sugars added to foods. They are in foods such as ice cream, candy, soda, fruit drinks, sports drinks, energy drinks, cookies, pastries, jams, and syrups. Cut down on added sugars by sharing sweet treats with a family member or friend. You can also choose fruit for dessert, and drink water or other unsweetened beverages.     [employer] last reviewed this educational content on 6/1/2020 2000-2021 [employer]. All rights reserved. This information is not intended as a substitute for professional medical care. Always follow your healthcare professional's instructions.

## 2022-10-13 NOTE — PROGRESS NOTES
"SUBJECTIVE:   Duane is a 71 year old who presents for Preventive Visit.      Patient has been advised of split billing requirements and indicates understanding: Yes  Are you in the first 12 months of your Medicare coverage?  No    Healthy Habits:     In general, how would you rate your overall health?  Excellent    Frequency of exercise:  None    Do you usually eat at least 4 servings of fruit and vegetables a day, include whole grains    & fiber and avoid regularly eating high fat or \"junk\" foods?  No    Taking medications regularly:  Yes    Medication side effects:  Not applicable    Ability to successfully perform activities of daily living:  No assistance needed    Home Safety:  No safety concerns identified    Hearing Impairment:  No hearing concerns    In the past 6 months, have you been bothered by leaking of urine?  No    In general, how would you rate your overall mental or emotional health?  Excellent      PHQ-2 Total Score: 0    Additional concerns today:  No    Do you feel safe in your environment? Yes    Have you ever done Advance Care Planning? (For example, a Health Directive, POLST, or a discussion with a medical provider or your loved ones about your wishes): Yes, advance care planning is on file.       Fall risk  Fallen 2 or more times in the past year?: No  Any fall with injury in the past year?: No    Cognitive Screening   1) Repeat 3 items (Leader, Season, Table)    2) Clock draw: NORMAL  3) 3 item recall: Recalls 2 objects   Results: NORMAL clock, 1-2 items recalled: COGNITIVE IMPAIRMENT LESS LIKELY    Mini-CogTM Copyright MIGNON Johnson. Licensed by the author for use in [facility]; reprinted with permission (marleen@.[facility]). All rights reserved.      Do you have sleep apnea, excessive snoring or daytime drowsiness?: no    Reviewed and updated as needed this visit by clinical staff   Tobacco  Allergies  Meds  Problems  Med Hx  Surg Hx  Fam Hx  Soc   Hx        Reviewed and updated " as needed this visit by Provider   Tobacco  Allergies  Meds  Problems  Med Hx  Surg Hx  Fam Hx         Social History     Tobacco Use     Smoking status: Never     Smokeless tobacco: Never   Substance Use Topics     Alcohol use: Yes     Comment: social          Alcohol Use 10/6/2022   Prescreen: >3 drinks/day or >7 drinks/week? No   Prescreen: >3 drinks/day or >7 drinks/week? -               Current providers sharing in care for this patient include:   Patient Care Team:  Riccardo Levy MD as PCP - General (Family Practice)  Riccardo Levy MD as Assigned PCP  Lulú Fish MD as Assigned Surgical Provider    The following health maintenance items are reviewed in Epic and correct as of today:  Health Maintenance   Topic Date Due     HEPATITIS B IMMUNIZATION (1 of 3 - 3-dose series) Never done     COVID-19 Vaccine (5 - Booster for Moderna series) 07/11/2022     INFLUENZA VACCINE (1) 09/01/2022     MEDICARE ANNUAL WELLNESS VISIT  10/13/2023     ANNUAL REVIEW OF HM ORDERS  10/13/2023     FALL RISK ASSESSMENT  10/13/2023     COLORECTAL CANCER SCREENING  03/22/2026     LIPID  10/11/2026     ADVANCE CARE PLANNING  10/13/2027     DTAP/TDAP/TD IMMUNIZATION (5 - Td or Tdap) 10/12/2030     HEPATITIS C SCREENING  Completed     PHQ-2 (once per calendar year)  Completed     Pneumococcal Vaccine: 65+ Years  Completed     ZOSTER IMMUNIZATION  Completed     AORTIC ANEURYSM SCREENING (SYSTEM ASSIGNED)  Completed     IPV IMMUNIZATION  Aged Out     MENINGITIS IMMUNIZATION  Aged Out     Labs reviewed in EPIC  BP Readings from Last 3 Encounters:   10/13/22 (!) 142/82   10/14/21 136/82   03/22/21 118/69    Wt Readings from Last 3 Encounters:   10/13/22 76.7 kg (169 lb 1.6 oz)   10/14/21 78.6 kg (173 lb 3.2 oz)   09/16/20 76.4 kg (168 lb 8 oz)                  Patient Active Problem List   Diagnosis     Varicose veins     CARDIOVASCULAR SCREENING; LDL GOAL LESS THAN 160     Erectile dysfunction, unspecified erectile dysfunction  "type     Peyronie's disease     History of dysplastic nevus     Past Surgical History:   Procedure Laterality Date     COLONOSCOPY  12/01/08     COLONOSCOPY N/A 11/19/2015    Procedure: COLONOSCOPY;  Surgeon: Alonso Gaitan MD;  Location: [facility]     COLONOSCOPY N/A 3/22/2021    Procedure: COLONOSCOPY, WITH POLYPECTOMY by forcep;  Surgeon: Jose Ni MD;  Location:  GI     ENDOSCOPIC LIGATION VEIN(S)       ZZHC COLONOSCOPY THRU STOMA WITH BIOPSY  1996       Social History     Tobacco Use     Smoking status: Never     Smokeless tobacco: Never   Substance Use Topics     Alcohol use: Yes     Comment: social      Family History   Problem Relation Age of Onset     Cancer - colorectal Father      Prostate Cancer Father      Heart Disease Father         by-pass surgery in 1993                 Review of Systems   Constitutional: Negative for chills and fever.   HENT: Negative for congestion, ear pain, hearing loss and sore throat.    Eyes: Negative for pain and visual disturbance.   Respiratory: Negative for cough and shortness of breath.    Cardiovascular: Negative for chest pain, palpitations and peripheral edema.   Gastrointestinal: Negative for abdominal pain, constipation, diarrhea, heartburn, hematochezia and nausea.   Genitourinary: Negative for dysuria, frequency, genital sores, hematuria, impotence, penile discharge and urgency.   Musculoskeletal: Negative for arthralgias, joint swelling and myalgias.   Skin: Negative for rash.   Neurological: Negative for dizziness, weakness, headaches and paresthesias.   Psychiatric/Behavioral: Negative for mood changes. The patient is not nervous/anxious.          OBJECTIVE:   /78   Pulse 85   Temp 97.6  F (36.4  C) (Temporal)   Resp 16   Ht 1.768 m (5' 9.61\")   Wt 76.7 kg (169 lb 1.6 oz)   SpO2 98%   BMI 24.54 kg/m   Estimated body mass index is 24.54 kg/m  as calculated from the following:    Height as of this encounter: 1.768 m (5' 9.61\").    " "Weight as of this encounter: 76.7 kg (169 lb 1.6 oz).  Physical Exam  GENERAL: healthy, alert and no distress  EYES: Eyes grossly normal to inspection, PERRL and conjunctivae and sclerae normal  HENT: ear canals and TM's normal, nose and mouth without ulcers or lesions  NECK: no adenopathy, no asymmetry, masses, or scars and thyroid normal to palpation  RESP: lungs clear to auscultation - no rales, rhonchi or wheezes  CV: regular rate and rhythm, normal S1 S2, no S3 or S4, no murmur, click or rub, no peripheral edema and peripheral pulses strong  ABDOMEN: soft, nontender, no hepatosplenomegaly, no masses and bowel sounds normal  MS: no gross musculoskeletal defects noted, no edema  NEURO: Normal strength and tone, mentation intact and speech normal  PSYCH: mentation appears normal, affect normal/bright  LYMPH: no cervical, supraclavicular, axillary, or inguinal adenopathy    Diagnostic Test Results:  Labs reviewed in Epic    ASSESSMENT / PLAN:       ICD-10-CM    1. Encounter for Medicare annual wellness exam  Z00.00 Basic metabolic panel  (Ca, Cl, CO2, Creat, Gluc, K, Na, BUN)     Lipid panel reflex to direct LDL Fasting     PSA, screen          Patient has been advised of split billing requirements and indicates understanding: Yes    COUNSELING:  Reviewed preventive health counseling, as reflected in patient instructions       Regular exercise       Healthy diet/nutrition       Vision screening       Dental care       Immunizations    Declined: Hepatitis B, COVID and Influenza due to Other will obtain and [facility]               Colon cancer screening       Prostate cancer screening    Estimated body mass index is 24.54 kg/m  as calculated from the following:    Height as of this encounter: 1.768 m (5' 9.61\").    Weight as of this encounter: 76.7 kg (169 lb 1.6 oz).        He reports that he has never smoked. He has never used smokeless tobacco.      Appropriate preventive services were discussed with " this patient, including applicable screening as appropriate for cardiovascular disease, diabetes, osteopenia/osteoporosis, and glaucoma.  As appropriate for age/gender, discussed screening for colorectal cancer, prostate cancer, breast cancer, and cervical cancer. Checklist reviewing preventive services available has been given to the patient.    Reviewed patients plan of care and provided an AVS. The Basic Care Plan (routine screening as documented in Health Maintenance) for Duane meets the Care Plan requirement. This Care Plan has been established and reviewed with the Patient.    Counseling Resources:  ATP IV Guidelines  Pooled Cohorts Equation Calculator  Breast Cancer Risk Calculator  Breast Cancer: Medication to Reduce Risk  FRAX Risk Assessment  ICSI Preventive Guidelines  Dietary Guidelines for Americans, 2010  USDA's MyPlate  ASA Prophylaxis  Lung CA Screening    Riccardo Levy MD  [facility]    Identified Health Risks:

## 2022-10-17 ENCOUNTER — LAB (OUTPATIENT)
Dept: LAB | Facility: CLINIC | Age: 72
End: 2022-10-17
Payer: COMMERCIAL

## 2022-10-17 DIAGNOSIS — Z00.00 ENCOUNTER FOR MEDICARE ANNUAL WELLNESS EXAM: ICD-10-CM

## 2022-10-17 LAB
ANION GAP SERPL CALCULATED.3IONS-SCNC: 2 MMOL/L (ref 3–14)
BUN SERPL-MCNC: 18 MG/DL (ref 7–30)
CALCIUM SERPL-MCNC: 8.4 MG/DL (ref 8.5–10.1)
CHLORIDE BLD-SCNC: 109 MMOL/L (ref 94–109)
CHOLEST SERPL-MCNC: 180 MG/DL
CO2 SERPL-SCNC: 30 MMOL/L (ref 20–32)
CREAT SERPL-MCNC: 1.12 MG/DL (ref 0.66–1.25)
FASTING STATUS PATIENT QL REPORTED: YES
GFR SERPL CREATININE-BSD FRML MDRD: 71 ML/MIN/1.73M2
GLUCOSE BLD-MCNC: 106 MG/DL (ref 70–99)
HDLC SERPL-MCNC: 76 MG/DL
LDLC SERPL CALC-MCNC: 91 MG/DL
NONHDLC SERPL-MCNC: 104 MG/DL
POTASSIUM BLD-SCNC: 4 MMOL/L (ref 3.4–5.3)
PSA SERPL-MCNC: 2.35 UG/L (ref 0–4)
SODIUM SERPL-SCNC: 141 MMOL/L (ref 133–144)
TRIGL SERPL-MCNC: 67 MG/DL

## 2022-10-17 PROCEDURE — 80048 BASIC METABOLIC PNL TOTAL CA: CPT

## 2022-10-17 PROCEDURE — 80061 LIPID PANEL: CPT

## 2022-10-17 PROCEDURE — 36415 COLL VENOUS BLD VENIPUNCTURE: CPT

## 2022-10-17 PROCEDURE — G0103 PSA SCREENING: HCPCS

## 2023-05-11 ENCOUNTER — OFFICE VISIT (OUTPATIENT)
Dept: DERMATOLOGY | Facility: CLINIC | Age: 73
End: 2023-05-11
Payer: COMMERCIAL

## 2023-05-11 DIAGNOSIS — Z86.018 HISTORY OF DYSPLASTIC NEVUS: ICD-10-CM

## 2023-05-11 DIAGNOSIS — L57.0 AK (ACTINIC KERATOSIS): Primary | ICD-10-CM

## 2023-05-11 DIAGNOSIS — L81.4 SOLAR LENTIGINOSIS: ICD-10-CM

## 2023-05-11 DIAGNOSIS — D23.9 DERMATOFIBROMA: ICD-10-CM

## 2023-05-11 PROCEDURE — 99213 OFFICE O/P EST LOW 20 MIN: CPT | Mod: 25 | Performed by: DERMATOLOGY

## 2023-05-11 PROCEDURE — 17003 DESTRUCT PREMALG LES 2-14: CPT | Performed by: DERMATOLOGY

## 2023-05-11 PROCEDURE — 17000 DESTRUCT PREMALG LESION: CPT | Performed by: DERMATOLOGY

## 2023-05-11 NOTE — NURSING NOTE
Duane W Dahlstrom's goals for this visit include:   Chief Complaint   Patient presents with     Skin Check     Patient here for a skin check, areas of concern none        He requests these members of his care team be copied on today's visit information:     PCP: Riccardo Levy    Referring Provider:  No referring provider defined for this encounter.    There were no vitals taken for this visit.    Do you need any medication refills at today's visit? No       Carola Rangel EMT

## 2023-05-11 NOTE — PATIENT INSTRUCTIONS
Patient Education     Checking for Skin Cancer  You can find cancer early by checking your skin each month. There are 3 kinds of skin cancer. They are melanoma, basal cell carcinoma, and squamous cell carcinoma. Doing monthly skin checks is the best way to find new marks or skin changes. Follow the instructions below for checking your skin.   The ABCDEs of checking moles for melanoma   Check your moles or growths for signs of melanoma using ABCDE:   Asymmetry: the sides of the mole or growth don t match  Border: the edges are ragged, notched, or blurred  Color: the color within the mole or growth varies  Diameter: the mole or growth is larger than 6 mm (size of a pencil eraser)  Evolving: the size, shape, or color of the mole or growth is changing (evolving is not shown in the images below)    Checking for other types of skin cancer  Basal cell carcinoma or squamous cell carcinoma have symptoms such as:     A spot or mole that looks different from all other marks on your skin  Changes in how an area feels, such as itching, tenderness, or pain  Changes in the skin's surface, such as oozing, bleeding, or scaliness  A sore that does not heal  New swelling or redness beyond the border of a mole    Who s at risk?  Anyone can get skin cancer. But you are at greater risk if you have:   Fair skin, light-colored hair, or light-colored eyes  Many moles or abnormal moles on your skin  A history of sunburns from sunlight or tanning beds  A family history of skin cancer  A history of exposure to radiation or chemicals  A weakened immune system  If you have had skin cancer in the past, you are at risk for recurring skin cancer.   How to check your skin  Do your monthly skin checkups in front of a full-length mirror. Check all parts of your body, including your:   Head (ears, face, neck, and scalp)  Torso (front, back, and sides)  Arms (tops, undersides, upper, and lower armpits)  Hands (palms, backs, and fingers, including  under the nails)  Buttocks and genitals  Legs (front, back, and sides)  Feet (tops, soles, toes, including under the nails, and between toes)  If you have a lot of moles, take digital photos of them each month. Make sure to take photos both up close and from a distance. These can help you see if any moles change over time.   Most skin changes are not cancer. But if you see any changes in your skin, call your doctor right away. Only he or she can diagnose a problem. If you have skin cancer, seeing your doctor can be the first step toward getting the treatment that could save your life.   [employer] last reviewed this educational content on 4/1/2019 2000-2020 [employer]. [address] [place] [address], [place] [address]. All rights reserved. This information is not intended as a substitute for professional medical care. Always follow your healthcare professional's instructions.       When should I call my doctor?  If you are worsening or not improving, please, contact us or seek urgent care as noted below.     Who should I call with questions (adults)?  [facility] (adult and pediatric): [phone]  [facility] (adult): [phone]  For urgent needs outside of business hours call the [facility] at [phone] and ask for the dermatology resident on call to be paged  If this is a medical emergency and you are unable to reach an ER, Call [phone]    Who should I call with questions (pediatric)?  [facility]- Pediatric Dermatology  Dr. Cecilia Honeycutt, Dr. Bob Mejia, Dr. Re Payne, OANH Hernandez, Dr. Taylor Meeks, Dr. Sheron Goodman & Dr. Zi Lou  Non-urgent nurse triage line; [phone]- [employer] Care Coordinatorroshan Pickens (/Complex ) [phone]    If you need a prescription refill, please contact your pharmacy. Refills are approved or denied by our  Physicians during normal business hours, Monday through Fridays  Per office policy, refills will not be granted if you have not been seen within the past year (or sooner depending on your child's condition)    Scheduling Information:  Pediatric Appointment Scheduling and Call Center [phone]  Radiology Scheduling- [phone]  Sedation Unit Scheduling- [phone]  [place] Scheduling- General [phone]; Pediatric Dermatology [phone]  Main  Services: [phone]  Turkmen: [phone]  Citizen of [place]: [phone]  Hmong/[place]/Yoruba: [phone]  Preadmission Nursing Department Fax Number: [phone] (Fax all pre-operative paperwork to this number)    For urgent matters arising during evenings, weekends, or holidays that cannot wait for normal business hours please call [phone] and ask for the dermatology resident on call to be paged.       Cryotherapy    What is it?  Use of a very cold liquid, such as liquid nitrogen, to freeze and destroy abnormal skin cells that need to be removed    What should I expect?  Tenderness and redness  A small blister that might grow and fill with dark purple blood. There may be crusting.  More than one treatment may be needed if the lesions do not go away.    How do I care for the treated area?  Gently wash the area with your hands when bathing.  Use a thin layer of Vaseline to help with healing. You may use a Band-Aid.   The area should heal within 7-10 days and may leave behind a pink or lighter color.   Do not use an antibiotic or Neosporin ointment.   You may take acetaminophen (Tylenol) for pain.     Call your doctor if you have:  Severe pain  Signs of infection (warmth, redness, cloudy yellow drainage, and or a bad smell)  Questions or concerns    Who should I call with questions?      [facility]: [phone]      [facility]: [phone]      For urgent needs outside of business  hours call the [facility] at [phone] and ask for the dermatology resident on call

## 2023-05-11 NOTE — PROGRESS NOTES
[facility] Dermatology Note  Encounter Date: May 11, 2023  Office Visit     Dermatology Problem List:  Skin check 5/11/23, recommend yearly  1. Actinic Keratosis   AK - left mid helix - bx 4/1/22  -s/p cryotherapy  2. History of atypical/dysplastic nevi  -Junctional dysplastic nevus with mild atypia, left scapula, bx 3/26/21  -Lentiginous compound melanocytic nevus with moderate to severe atypia, central posterior neck s/p biopsy 10/16/2019 s/p excision 12/02/2019  -Junctional nevus with atypical features, right (lateral) thigh, s/p excision 12/20/2016  -Junctional nevus with moderate dysplasia, lower paraspinal back, s/p biopsy 11/14/2016  -Junctional dysplastic nevus with mild atypia, left chest, s/p biopsy 4/10/2015  -Lentiginous compound melanocytic nevus with mild atypia, upper back, s/p biopsy 4/10/2015  -Patient reports two prior atypical lesions in 1980  3. Neurofibroma, upper mid back, s/p biopsy 11/9/2015  4. Folliculitis  5. Benign biopsies  - Macular seborrheic keratosis, left mid paraspinal back, bx 3/26/21  ____________________________________________     Assessment & Plan:     # History of dysplastic nevi, no clinical evidence of recurrence.  - ABCDEs: Counseled ABCDEs of melanoma: Asymmetry, Border (irregularity), Color (not uniform, changes in color), Diameter (greater than 6 mm which is about the size of a pencil eraser), and Evolving (any changes in preexisting moles).  - Sun protection: Counseled SPF30+ sunscreen, UPF clothing, sun avoidance, tanning bed avoidance.  - Recommended regular skin checks.     # Actinic keratosis - left helix, left antihelix x 2. Based on the location and chronic irritation to this lesion, treatment with cryotherapy is warranted.   - See cryo procedure note.     # Solar lentigines on the sun exposed skin areas. Benign nature was discussed. No further intervention required at this time.       # Dermatofibroma - left thigh. These are usually caused by  trauma, and do not have any evidence of malignancy. No intervention is necessary.      Procedures Performed:   - Cryotherapy procedure note, location(s): left helix, left antihelix. After verbal consent and discussion of risks and benefits including, but not limited to, dyspigmentation/scar, blister, and pain, 2 AK(s) was(were) treated with 1-2 mm freeze border for 1-2 cycles with liquid nitrogen. Post cryotherapy instructions were provided.      Follow-up: 1 year(s) in-person for a skin check, or earlier for new or changing lesions    Staff and Scribe:     Scribe Disclosure:   I, Richard Quintero, am serving as a scribe to document services personally performed by this physician, Dr. Lulú Fish, based on data collection and the provider's statements to me.     Provider Disclosure:   The documentation recorded by the scribe accurately reflects the services I personally performed and the decisions made by me.    Lulú Fish MD    Department of Dermatology  [facility]: Phone: [phone], Fax:[phone]  [facility]: Phone: [phone], Fax: [phone]    ____________________________________________    CC: Skin Check (Patient here for a skin check, areas of concern none )    HPI:  Mr. Duane W Dahlstrom is a(n) 72 year old male who presents today as a return patient for a skin check.    Last seen 7/26/22 for a biopsy follow up. At that time, 1 AK was treated with cryo.    Today, he has no areas of concern.    Patient is otherwise feeling well, without additional skin concerns.    Labs Reviewed:  N/A    Physical Exam:  Vitals: There were no vitals taken for this visit.  SKIN: Total skin excluding the undergarment areas was performed. The exam included the head/face, neck, both arms, chest, back, abdomen, both legs, digits and/or nails.   - Well healed scars at sites of previous DN, no  repigmentation or nodularity noted.   - There are erythematous macules with overyling adherent scale on the left helix, left antihelix x 2.    - Scattered brown macules on sun exposed areas.   - There is a firm tan/flesh colored papule that dimples with lateral pressure on the left thigh.   - No other lesions of concern on areas examined.     Medications:  No current outpatient medications on file.     No current facility-administered medications for this visit.      Past Medical History:   Patient Active Problem List   Diagnosis     Varicose veins     CARDIOVASCULAR SCREENING; LDL GOAL LESS THAN 160     Erectile dysfunction, unspecified erectile dysfunction type     Peyronie's disease     History of dysplastic nevus     Past Medical History:   Diagnosis Date     Allergic rhinitis, cause unspecified     Seasonal rhinitis     Syncope and collapse 1998     Varicose veins of leg         CC No referring provider defined for this encounter. on close of this encounter.

## 2023-05-11 NOTE — LETTER
5/11/2023         RE: Duane W Dahlstrom  [address]  [place] [facility] [address]        Dear Colleague,    Thank you for referring your patient, Duane W Dahlstrom, to the [facility]. Please see a copy of my visit note below.    [facility] Dermatology Note  Encounter Date: May 11, 2023  Office Visit     Dermatology Problem List:  Skin check 5/11/23, recommend yearly  1. Actinic Keratosis   AK - left mid helix - bx 4/1/22  -s/p cryotherapy  2. History of atypical/dysplastic nevi  -Junctional dysplastic nevus with mild atypia, left scapula, bx 3/26/21  -Lentiginous compound melanocytic nevus with moderate to severe atypia, central posterior neck s/p biopsy 10/16/2019 s/p excision 12/02/2019  -Junctional nevus with atypical features, right (lateral) thigh, s/p excision 12/20/2016  -Junctional nevus with moderate dysplasia, lower paraspinal back, s/p biopsy 11/14/2016  -Junctional dysplastic nevus with mild atypia, left chest, s/p biopsy 4/10/2015  -Lentiginous compound melanocytic nevus with mild atypia, upper back, s/p biopsy 4/10/2015  -Patient reports two prior atypical lesions in 1980  3. Neurofibroma, upper mid back, s/p biopsy 11/9/2015  4. Folliculitis  5. Benign biopsies  - Macular seborrheic keratosis, left mid paraspinal back, bx 3/26/21  ____________________________________________     Assessment & Plan:     # History of dysplastic nevi, no clinical evidence of recurrence.  - ABCDEs: Counseled ABCDEs of melanoma: Asymmetry, Border (irregularity), Color (not uniform, changes in color), Diameter (greater than 6 mm which is about the size of a pencil eraser), and Evolving (any changes in preexisting moles).  - Sun protection: Counseled SPF30+ sunscreen, UPF clothing, sun avoidance, tanning bed avoidance.  - Recommended regular skin checks.     # Actinic keratosis - left helix, left antihelix x 2. Based on the location and chronic irritation to this lesion, treatment  with cryotherapy is warranted.   - See cryo procedure note.     # Solar lentigines on the sun exposed skin areas. Benign nature was discussed. No further intervention required at this time.       # Dermatofibroma - left thigh. These are usually caused by trauma, and do not have any evidence of malignancy. No intervention is necessary.      Procedures Performed:   - Cryotherapy procedure note, location(s): left helix, left antihelix. After verbal consent and discussion of risks and benefits including, but not limited to, dyspigmentation/scar, blister, and pain, 2 AK(s) was(were) treated with 1-2 mm freeze border for 1-2 cycles with liquid nitrogen. Post cryotherapy instructions were provided.      Follow-up: 1 year(s) in-person for a skin check, or earlier for new or changing lesions    Staff and Scribe:     Scribe Disclosure:   I, Richard Quintero, am serving as a scribe to document services personally performed by this physician, Dr. Lulú Fish, based on data collection and the provider's statements to me.     Provider Disclosure:   The documentation recorded by the scribe accurately reflects the services I personally performed and the decisions made by me.    Lulú Fish MD    Department of Dermatology  [facility]: Phone: [phone], Fax:[phone]  [facility]: Phone: [phone], Fax: [phone]    ____________________________________________    CC: Skin Check (Patient here for a skin check, areas of concern none )    HPI:  Mr. Duane W Dahlstrom is a(n) 72 year old male who presents today as a return patient for a skin check.    Last seen 7/26/22 for a biopsy follow up. At that time, 1 AK was treated with cryo.    Today, he has no areas of concern.    Patient is otherwise feeling well, without additional skin concerns.    Labs Reviewed:  N/A    Physical Exam:  Vitals: There were no  vitals taken for this visit.  SKIN: Total skin excluding the undergarment areas was performed. The exam included the head/face, neck, both arms, chest, back, abdomen, both legs, digits and/or nails.   - Well healed scars at sites of previous DN, no repigmentation or nodularity noted.   - There are erythematous macules with overyling adherent scale on the left helix, left antihelix x 2.    - Scattered brown macules on sun exposed areas.   - There is a firm tan/flesh colored papule that dimples with lateral pressure on the left thigh.   - No other lesions of concern on areas examined.     Medications:  No current outpatient medications on file.     No current facility-administered medications for this visit.      Past Medical History:   Patient Active Problem List   Diagnosis     Varicose veins     CARDIOVASCULAR SCREENING; LDL GOAL LESS THAN 160     Erectile dysfunction, unspecified erectile dysfunction type     Peyronie's disease     History of dysplastic nevus     Past Medical History:   Diagnosis Date     Allergic rhinitis, cause unspecified     Seasonal rhinitis     Syncope and collapse 1998     Varicose veins of leg         CC No referring provider defined for this encounter. on close of this encounter.     Again, thank you for allowing me to participate in the care of your patient.        Sincerely,        Lulú Fish MD

## 2023-10-12 NOTE — PATIENT INSTRUCTIONS
Cryotherapy    What is it?  Use of a very cold liquid, such as liquid nitrogen, to freeze and destroy abnormal skin cells that need to be removed    What should I expect?  Tenderness and redness  A small blister that might grow and fill with dark purple blood. There may be crusting.  More than one treatment may be needed if the lesions do not go away.    How do I care for the treated area?  Gently wash the area with your hands when bathing.  Use a thin layer of Vaseline to help with healing. You may use a Band-Aid.   The area should heal within 7-10 days and may leave behind a pink or lighter color.   Do not use an antibiotic or Neosporin ointment.   You may take acetaminophen (Tylenol) for pain.     Call your doctor if you have:  Severe pain  Signs of infection (warmth, redness, cloudy yellow drainage, and or a bad smell)  Questions or concerns    Who should I call with questions?      [facility]: [phone]      [facility]: [phone]      For urgent needs outside of business hours call the [facility] at [phone] and ask for the dermatology resident on call    I would recommend patient discontinue Citalopram. She could try Caplyta 10.5 mg PO daily to improve mood and paranoia. As for energy, I would recommend increasing activity for a natural boost as any medications that boost energy are likely to worsen irritability and anxiety.

## 2023-10-15 ASSESSMENT — ENCOUNTER SYMPTOMS
CONSTIPATION: 0
DIZZINESS: 0
JOINT SWELLING: 0
EYE PAIN: 0
NAUSEA: 0
DIARRHEA: 0
FEVER: 0
HEADACHES: 0
FREQUENCY: 0
ABDOMINAL PAIN: 0
SORE THROAT: 0
WEAKNESS: 0
NERVOUS/ANXIOUS: 0
HEMATURIA: 0
DYSURIA: 0
SHORTNESS OF BREATH: 0
HEMATOCHEZIA: 0
PARESTHESIAS: 0
CHILLS: 0
HEARTBURN: 0
PALPITATIONS: 0
ARTHRALGIAS: 0
COUGH: 0
MYALGIAS: 0

## 2023-10-15 ASSESSMENT — ACTIVITIES OF DAILY LIVING (ADL): CURRENT_FUNCTION: NO ASSISTANCE NEEDED

## 2023-10-16 ENCOUNTER — TELEPHONE (OUTPATIENT)
Dept: FAMILY MEDICINE | Facility: CLINIC | Age: 73
End: 2023-10-16

## 2023-10-16 ENCOUNTER — OFFICE VISIT (OUTPATIENT)
Dept: FAMILY MEDICINE | Facility: CLINIC | Age: 73
End: 2023-10-16
Payer: COMMERCIAL

## 2023-10-16 VITALS
HEIGHT: 70 IN | HEART RATE: 78 BPM | BODY MASS INDEX: 24.15 KG/M2 | DIASTOLIC BLOOD PRESSURE: 80 MMHG | SYSTOLIC BLOOD PRESSURE: 132 MMHG | OXYGEN SATURATION: 98 % | TEMPERATURE: 98.7 F | WEIGHT: 168.7 LBS | RESPIRATION RATE: 14 BRPM

## 2023-10-16 DIAGNOSIS — Z12.5 SCREENING FOR PROSTATE CANCER: ICD-10-CM

## 2023-10-16 DIAGNOSIS — H10.33 ACUTE CONJUNCTIVITIS OF BOTH EYES, UNSPECIFIED ACUTE CONJUNCTIVITIS TYPE: Primary | ICD-10-CM

## 2023-10-16 DIAGNOSIS — H10.33 ACUTE CONJUNCTIVITIS OF BOTH EYES, UNSPECIFIED ACUTE CONJUNCTIVITIS TYPE: ICD-10-CM

## 2023-10-16 DIAGNOSIS — Z00.00 ENCOUNTER FOR MEDICARE ANNUAL WELLNESS EXAM: Primary | ICD-10-CM

## 2023-10-16 PROCEDURE — G0439 PPPS, SUBSEQ VISIT: HCPCS | Performed by: FAMILY MEDICINE

## 2023-10-16 RX ORDER — NEOMYCIN/POLYMYXIN B/HYDROCORT 3.5-10K-1
1-2 SUSPENSION, DROPS(FINAL DOSAGE FORM)(ML) OPHTHALMIC (EYE) 4 TIMES DAILY
Qty: 1.2 ML | Refills: 0 | Status: SHIPPED | OUTPATIENT
Start: 2023-10-16 | End: 2023-10-16

## 2023-10-16 RX ORDER — NEOMYCIN POLYMYXIN B SULFATES AND DEXAMETHASONE 3.5; 10000; 1 MG/ML; [USP'U]/ML; MG/ML
SUSPENSION/ DROPS OPHTHALMIC
Qty: 5 ML | Refills: 0 | Status: SHIPPED | OUTPATIENT
Start: 2023-10-16

## 2023-10-16 RX ORDER — RESPIRATORY SYNCYTIAL VIRUS VACCINE 120MCG/0.5
0.5 KIT INTRAMUSCULAR ONCE
Qty: 1 EACH | Refills: 0 | Status: CANCELLED | OUTPATIENT
Start: 2023-10-16 | End: 2023-10-16

## 2023-10-16 ASSESSMENT — ENCOUNTER SYMPTOMS
NAUSEA: 0
MYALGIAS: 0
CHILLS: 0
PALPITATIONS: 0
PARESTHESIAS: 0
FEVER: 0
HEMATOCHEZIA: 0
DYSURIA: 0
CONSTIPATION: 0
DIZZINESS: 0
WEAKNESS: 0
JOINT SWELLING: 0
HEADACHES: 0
ABDOMINAL PAIN: 0
DIARRHEA: 0
HEMATURIA: 0
COUGH: 0
EYE PAIN: 0
SORE THROAT: 0
ARTHRALGIAS: 0
FREQUENCY: 0
HEARTBURN: 0
SHORTNESS OF BREATH: 0
NERVOUS/ANXIOUS: 0

## 2023-10-16 ASSESSMENT — ACTIVITIES OF DAILY LIVING (ADL): CURRENT_FUNCTION: NO ASSISTANCE NEEDED

## 2023-10-16 NOTE — TELEPHONE ENCOUNTER
New Medication Request        What medication are you requesting?: Pharmacist calling to request Star-poly-dex eyedrops. Pharmacy does not carry currently ordered star-poly-hc drops at this time    Reason for medication request: Pharmacy does not have the current medication in stock      Preferred Pharmacy:   [id number] - [address] [address]  Phone: [phone] Fax: [phone]      Could we send this information to you in [facility] or would you prefer to receive a phone call?:   Call Shannon Solano Back

## 2023-10-16 NOTE — PATIENT INSTRUCTIONS
"Patient Education   Personalized Prevention Plan  You are due for the preventive services outlined below.  Your care team is available to assist you in scheduling these services.  If you have already completed any of these items, please share that information with your care team to update in your medical record.  Health Maintenance Due   Topic Date Due     RSV VACCINE 60+ (1 - 1-dose 60+ series) Never done     Flu Vaccine (1) 09/01/2023     COVID-19 Vaccine (5 - 2023-24 season) 09/01/2023     ANNUAL REVIEW OF HM ORDERS  10/13/2023     Learning About Being Physically Active  What is physical activity?     Being physically active means doing any kind of activity that gets your body moving.  The types of physical activity that can help you get fit and stay healthy include:  Aerobic or \"cardio\" activities. These make your heart beat faster and make you breathe harder, such as brisk walking, riding a bike, or running. They strengthen your heart and lungs and build up your endurance.  Strength training activities. These make your muscles work against, or \"resist,\" something. Examples include lifting weights or doing push-ups. These activities help tone and strengthen your muscles and bones.  Stretches. These let you move your joints and muscles through their full range of motion. Stretching helps you be more flexible.  Reaching a balance between these three types of physical activity is important because each one contributes to your overall fitness.  What are the benefits of being active?  Being active is one of the best things you can do for your health. It helps you to:  Feel stronger and have more energy to do all the things you like to do.  Focus better at school or work.  Feel, think, and sleep better.  Reach and stay at a healthy weight.  Lose fat and build lean muscle.  Lower your risk for serious health problems, including diabetes, heart attack, high blood pressure, and some cancers.  Keep your heart, lungs, " "bones, muscles, and joints strong and healthy.  How can you make being active part of your life?  Start slowly. Make it your long-term goal to get at least 30 minutes of exercise on most days of the week. Walking is a good choice. You also may want to do other activities, such as running, swimming, cycling, or playing tennis or team sports.  Pick activities that you like--ones that make your heart beat faster, your muscles stronger, and your muscles and joints more flexible. If you find more than one thing you like doing, do them all. You don't have to do the same thing every day.  Get your heart pumping every day. Any activity that makes your heart beat faster and keeps it at that rate for a while counts.  Here are some great ways to get your heart beating faster:  Go for a brisk walk, run, or bike ride.  Go for a hike or swim.  Go in-line skating.  Play a game of touch football, basketball, or soccer.  Ride a bike.  Play tennis or racquetball.  Climb stairs.  Even some household chores can be aerobic--just do them at a faster pace. Vacuuming, raking or mowing the lawn, sweeping the garage, and washing and waxing the car all can help get your heart rate up.  Strengthen your muscles during the week. You don't have to lift heavy weights or grow big, bulky muscles to get stronger. Doing a few simple activities that make your muscles work against, or \"resist,\" something can help you get stronger.  For example, you can:  Do push-ups or sit-ups, which use your own body weight as resistance.  Lift weights or dumbbells or use stretch bands at home or in a gym or community center.  Stretch your muscles often. Stretching will help you as you become more active. It can help you stay flexible, loosen tight muscles, and avoid injury. It can also help improve your balance and posture and can be a great way to relax.  Be sure to stretch the muscles you'll be using when you work out. It's best to warm your muscles slightly before " "you stretch them. Walk or do some other light aerobic activity for a few minutes, and then start stretching.  When you stretch your muscles:  Do it slowly. Stretching is not about going fast or making sudden movements.  Don't push or bounce during a stretch.  Hold each stretch for at least 15 to 30 seconds, if you can. You should feel a stretch in the muscle, but not pain.  Breathe out as you do the stretch. Then breathe in as you hold the stretch. Don't hold your breath.  If you're worried about how more activity might affect your health, have a checkup before you start. Follow any special advice your doctor gives you for getting a smart start.  Where can you learn more?  Go to https://www.[employer].net/patiented  Enter W332 in the search box to learn more about \"Learning About Being Physically Active.\"  Current as of: October 10, 2022               Content Version: 13.7    1269-5323 [employer].   Care instructions adapted under license by your healthcare professional. If you have questions about a medical condition or this instruction, always ask your healthcare professional. [employer] disclaims any warranty or liability for your use of this information.      Learning About Dietary Guidelines  What are the Dietary Guidelines for Americans?     Dietary Guidelines for Americans provide tips for eating well and staying healthy. This helps reduce the risk for long-term (chronic) diseases.  These guidelines recommend that you:  Eat and drink the right amount for you. The U.[place] government's food guide is called MyPlate. It can help you make your own well-balanced eating plan.  Try to balance your eating with your activity. This helps you stay at a healthy weight.  Drink alcohol in moderation, if at all.  Limit foods high in salt, saturated fat, trans fat, and added sugar.  These guidelines are from the U.S. Department of Agriculture and the U.S. Department of Health and Human Services. They " "are updated every 5 years.  What is MyPlate?  MyPlate is the [place] government's food guide. It can help you make your own well-balanced eating plan. A balanced eating plan means that you eat enough, but not too much, and that your food gives you the nutrients you need to stay healthy.  MyPlate focuses on eating plenty of whole grains, fruits, and vegetables, and on limiting fat and sugar. It is available online at www.ChooseMyPlate.gov.  How can you get started?  If you're trying to eat healthier, you can slowly change your eating habits over time. You don't have to make big changes all at once. Start by adding one or two healthy foods to your meals each day.  Grains  Choose whole-grain breads and cereals and whole-wheat pasta and whole-grain crackers.  Vegetables  Eat a variety of vegetables every day. They have lots of nutrients and are part of a heart-healthy diet.  Fruits  Eat a variety of fruits every day. Fruits contain lots of nutrients. Choose fresh fruit instead of fruit juice.  Protein foods  Choose fish and lean poultry more often. Eat red meat and fried meats less often. Dried beans, tofu, and nuts are also good sources of protein.  Dairy  Choose low-fat or fat-free products from this food group. If you have problems digesting milk, try eating cheese or yogurt instead.  Fats and oils  Limit fats and oils if you're trying to cut calories. Choose healthy fats when you cook. These include canola oil and olive oil.  Where can you learn more?  Go to https://www.[employer].net/patiented  Enter D676 in the search box to learn more about \"Learning About Dietary Guidelines.\"  Current as of: March 1, 2023               Content Version: 13.7    1405-1944 [employer].   Care instructions adapted under license by your healthcare professional. If you have questions about a medical condition or this instruction, always ask your healthcare professional. [employer] disclaims any warranty or " liability for your use of this information.

## 2023-10-16 NOTE — PROGRESS NOTES
"SUBJECTIVE:   Duane is a 72 year old who presents for Preventive Visit.      10/16/2023     3:56 PM   Additional Questions   Roomed by Janett espitia       Are you in the first 12 months of your Medicare coverage?      Healthy Habits:     In general, how would you rate your overall health?  Good    Frequency of exercise:  1 day/week    Duration of exercise:  Less than 15 minutes    Do you usually eat at least 4 servings of fruit and vegetables a day, include whole grains    & fiber and avoid regularly eating high fat or \"junk\" foods?  No    Taking medications regularly:  Yes    Barriers to taking medications:  None    Medication side effects:  None    Ability to successfully perform activities of daily living:  No assistance needed    Home Safety:  No safety concerns identified    Hearing Impairment:  No hearing concerns    In the past 6 months, have you been bothered by leaking of urine?  No    In general, how would you rate your overall mental or emotional health?  Excellent    Additional concerns today:  No      Today's PHQ-2 Score:       10/15/2023    12:03 PM   PHQ-2 ( 1999 Pf[employer])   Q1: Little interest or pleasure in doing things 0   Q2: Feeling down, depressed or hopeless 0   PHQ-2 Score 0   Q1: Little interest or pleasure in doing things Not at all   Q2: Feeling down, depressed or hopeless Not at all   PHQ-2 Score 0           Have you ever done Advance Care Planning? (For example, a Health Directive, POLST, or a discussion with a medical provider or your loved ones about your wishes): Yes, patient states has an Advance Care Planning document and will bring a copy to the clinic.       Fall risk  Fallen 2 or more times in the past year?: No  Any fall with injury in the past year?: No    Cognitive Screening   1) Repeat 3 items (Leader, Season, Table)    2) Clock draw: NORMAL  3) 3 item recall: Recalls 3 objects  Results: 3 items recalled: COGNITIVE IMPAIRMENT LESS LIKELY    Mini-CogTM Copyright S Elizabeth. Licensed by " the author for use in [facility]; reprinted with permission ([email or web address]). All rights reserved.      Do you have sleep apnea, excessive snoring or daytime drowsiness? : no    Reviewed and updated as needed this visit by clinical staff   Tobacco                Reviewed and updated as needed this visit by Provider                 Social History     Tobacco Use     Smoking status: Never     Smokeless tobacco: Never   Substance Use Topics     Alcohol use: Yes     Comment: social              10/15/2023    12:03 PM   Alcohol Use   Prescreen: >3 drinks/day or >7 drinks/week? No     Do you have a current opioid prescription? No  Do you use any other controlled substances or medications that are not prescribed by a provider? None              Current providers sharing in care for this patient include:   Patient Care Team:  Riccardo Levy MD as PCP - General (Family Practice)  Riccardo Levy MD as Assigned PCP  Lulú Fish MD as Assigned Surgical Provider    The following health maintenance items are reviewed in Epic and correct as of today:  Health Maintenance   Topic Date Due     RSV VACCINE 60+ (1 - 1-dose 60+ series) Never done     INFLUENZA VACCINE (1) 09/01/2023     COVID-19 Vaccine (5 - 2023-24 season) 09/01/2023     ANNUAL REVIEW OF HM ORDERS  10/13/2023     MEDICARE ANNUAL WELLNESS VISIT  10/13/2023     FALL RISK ASSESSMENT  10/16/2024     COLORECTAL CANCER SCREENING  03/22/2026     ADVANCE CARE PLANNING  10/13/2027     LIPID  10/17/2027     DTAP/TDAP/TD IMMUNIZATION (3 - Td or Tdap) 10/12/2030     HEPATITIS C SCREENING  Completed     PHQ-2 (once per calendar year)  Completed     Pneumococcal Vaccine: 65+ Years  Completed     ZOSTER IMMUNIZATION  Completed     AORTIC ANEURYSM SCREENING (SYSTEM ASSIGNED)  Completed     IPV IMMUNIZATION  Aged Out     HPV IMMUNIZATION  Aged Out     MENINGITIS IMMUNIZATION  Aged Out     Labs reviewed in EPIC  BP Readings from Last 3 Encounters:   10/16/23 (!) 142/90    10/13/22 138/78   10/14/21 136/82    Wt Readings from Last 3 Encounters:   10/16/23 76.5 kg (168 lb 11.2 oz)   10/13/22 76.7 kg (169 lb 1.6 oz)   10/14/21 78.6 kg (173 lb 3.2 oz)                  Patient Active Problem List   Diagnosis     Varicose veins     CARDIOVASCULAR SCREENING; LDL GOAL LESS THAN 160     Erectile dysfunction, unspecified erectile dysfunction type     Peyronie's disease     History of dysplastic nevus     Past Surgical History:   Procedure Laterality Date     COLONOSCOPY  12/01/08     COLONOSCOPY N/A 11/19/2015    Procedure: COLONOSCOPY;  Surgeon: Alonso Gaitan MD;  Location: [facility]     COLONOSCOPY N/A 3/22/2021    Procedure: COLONOSCOPY, WITH POLYPECTOMY by forcep;  Surgeon: Jose Ni MD;  Location: [facility]     ENDOSCOPIC LIGATION VEIN(S)       [facility] COLONOSCOPY THRU STOMA WITH BIOPSY  1996       Social History     Tobacco Use     Smoking status: Never     Smokeless tobacco: Never   Substance Use Topics     Alcohol use: Yes     Comment: social      Family History   Problem Relation Age of Onset     Cancer - colorectal Father      Prostate Cancer Father      Heart Disease Father         by-pass surgery in 1993         No current outpatient medications on file.     Allergies   Allergen Reactions     No Known Drug Allergy      Recent Labs   Lab Test 10/17/22  0747 10/11/21  0828 09/13/20  0817   LDL 91 96 110*   HDL 76 82 79   TRIG 67 57 61   CR 1.12 1.12 1.09   GFRESTIMATED 71 67 69   GFRESTBLACK  --   --  81   POTASSIUM 4.0 4.2 3.9              Review of Systems   Constitutional:  Negative for chills and fever.   HENT:  Negative for congestion, ear pain, hearing loss and sore throat.    Eyes:  Negative for pain and visual disturbance.   Respiratory:  Negative for cough and shortness of breath.    Cardiovascular:  Negative for chest pain, palpitations and peripheral edema.   Gastrointestinal:  Negative for abdominal pain, constipation, diarrhea, heartburn, hematochezia and  "nausea.   Genitourinary:  Negative for dysuria, frequency, genital sores, hematuria, impotence, penile discharge and urgency.   Musculoskeletal:  Negative for arthralgias, joint swelling and myalgias.   Skin:  Negative for rash.   Neurological:  Negative for dizziness, weakness, headaches and paresthesias.   Psychiatric/Behavioral:  Negative for mood changes. The patient is not nervous/anxious.          OBJECTIVE:   BP (!) 142/90   Pulse 78   Temp 98.7  F (37.1  C)   Resp 14   Ht 1.775 m (5' 9.88\")   Wt 76.5 kg (168 lb 11.2 oz)   SpO2 98%   BMI 24.29 kg/m   Estimated body mass index is 24.29 kg/m  as calculated from the following:    Height as of this encounter: 1.775 m (5' 9.88\").    Weight as of this encounter: 76.5 kg (168 lb 11.2 oz).  Physical Exam  GENERAL: healthy, alert and no distress  EYES: Eyes grossly normal to inspection, eyelids- normal, and no foreign body or corneal abrasion  HENT: ear canals and TM's normal, nose and mouth without ulcers or lesions  NECK: no adenopathy, no asymmetry, masses, or scars and thyroid normal to palpation  RESP: lungs clear to auscultation - no rales, rhonchi or wheezes  CV: regular rate and rhythm, normal S1 S2, no S3 or S4, no murmur, click or rub, no peripheral edema and peripheral pulses strong  ABDOMEN: soft, nontender, no hepatosplenomegaly, no masses and bowel sounds normal  MS: no gross musculoskeletal defects noted, no edema  NEURO: Normal strength and tone, mentation intact and speech normal  PSYCH: mentation appears normal, affect normal/bright  LYMPH: no cervical, supraclavicular, axillary, or inguinal adenopathy    Diagnostic Test Results:  Labs reviewed in Epic    ASSESSMENT / PLAN:       ICD-10-CM    1. Encounter for Medicare annual wellness exam  Z00.00 Basic metabolic panel  (Ca, Cl, CO2, Creat, Gluc, K, Na, BUN)     Lipid panel reflex to direct LDL Fasting      2. Acute conjunctivitis of both eyes, unspecified acute conjunctivitis type  H10.33 " neomycin-polymyxin-hydrocortisone (CORTISPORIN) 3.5-70928-1 ophthalmic suspension          Patient has been advised of split billing requirements and indicates understanding: Yes      COUNSELING:  Reviewed preventive health counseling, as reflected in patient instructions       Regular exercise       Healthy diet/nutrition       Vision screening       Fall risk prevention       Immunizations  Declined: Covid-19 and Influenza due to Other will consider at later date.             Prostate cancer screening        He reports that he has never smoked. He has never used smokeless tobacco.      Appropriate preventive services were discussed with this patient, including applicable screening as appropriate for fall prevention, nutrition, physical activity, Tobacco-use cessation, weight loss and cognition.  Checklist reviewing preventive services available has been given to the patient.    Reviewed patients plan of care and provided an AVS. The Basic Care Plan (routine screening as documented in Health Maintenance) for Duane meets the Care Plan requirement. This Care Plan has been established and reviewed with the Patient.          Riccardo Levy MD  [facility]    Identified Health Risks:  I have reviewed Opioid Use Disorder and Substance Use Disorder risk factors and made any needed referrals.

## 2023-10-16 NOTE — PROGRESS NOTES
He is at risk for lack of exercise and has been provided with information to increase physical activity for the benefit of his well-being.  The patient was counseled and encouraged to consider modifying their diet and eating habits. He was provided with information on recommended healthy diet options.

## 2023-10-20 ENCOUNTER — LAB (OUTPATIENT)
Dept: LAB | Facility: CLINIC | Age: 73
End: 2023-10-20
Payer: COMMERCIAL

## 2023-10-20 DIAGNOSIS — Z12.5 SCREENING FOR PROSTATE CANCER: ICD-10-CM

## 2023-10-20 DIAGNOSIS — Z00.00 ENCOUNTER FOR MEDICARE ANNUAL WELLNESS EXAM: ICD-10-CM

## 2023-10-20 LAB
ANION GAP SERPL CALCULATED.3IONS-SCNC: 9 MMOL/L (ref 7–15)
BUN SERPL-MCNC: 16.6 MG/DL (ref 8–23)
CALCIUM SERPL-MCNC: 9.1 MG/DL (ref 8.8–10.2)
CHLORIDE SERPL-SCNC: 104 MMOL/L (ref 98–107)
CHOLEST SERPL-MCNC: 178 MG/DL
CREAT SERPL-MCNC: 1.19 MG/DL (ref 0.67–1.17)
DEPRECATED HCO3 PLAS-SCNC: 25 MMOL/L (ref 22–29)
EGFRCR SERPLBLD CKD-EPI 2021: 66 ML/MIN/1.73M2
GLUCOSE SERPL-MCNC: 104 MG/DL (ref 70–99)
HDLC SERPL-MCNC: 67 MG/DL
LDLC SERPL CALC-MCNC: 96 MG/DL
NONHDLC SERPL-MCNC: 111 MG/DL
POTASSIUM SERPL-SCNC: 4.1 MMOL/L (ref 3.4–5.3)
PSA SERPL DL<=0.01 NG/ML-MCNC: 2.07 NG/ML (ref 0–6.5)
SODIUM SERPL-SCNC: 138 MMOL/L (ref 135–145)
TRIGL SERPL-MCNC: 73 MG/DL

## 2023-10-20 PROCEDURE — 36415 COLL VENOUS BLD VENIPUNCTURE: CPT

## 2023-10-20 PROCEDURE — 80048 BASIC METABOLIC PNL TOTAL CA: CPT

## 2023-10-20 PROCEDURE — 80061 LIPID PANEL: CPT

## 2023-10-20 PROCEDURE — G0103 PSA SCREENING: HCPCS

## 2024-01-25 ENCOUNTER — MYC MEDICAL ADVICE (OUTPATIENT)
Dept: FAMILY MEDICINE | Facility: CLINIC | Age: 74
End: 2024-01-25
Payer: COMMERCIAL

## 2024-01-25 NOTE — TELEPHONE ENCOUNTER
Patient notified last td was 2020.  He is at urgent care getting stiches for his cut.    Raven Ramirez RN on 1/25/2024 at 12:47 PM

## 2024-04-08 ENCOUNTER — OFFICE VISIT (OUTPATIENT)
Dept: FAMILY MEDICINE | Facility: CLINIC | Age: 74
End: 2024-04-08
Payer: COMMERCIAL

## 2024-04-08 VITALS
DIASTOLIC BLOOD PRESSURE: 80 MMHG | WEIGHT: 175 LBS | BODY MASS INDEX: 25.92 KG/M2 | SYSTOLIC BLOOD PRESSURE: 120 MMHG | OXYGEN SATURATION: 98 % | RESPIRATION RATE: 12 BRPM | TEMPERATURE: 98.2 F | HEIGHT: 69 IN | HEART RATE: 78 BPM

## 2024-04-08 DIAGNOSIS — M25.551 HIP PAIN, RIGHT: ICD-10-CM

## 2024-04-08 DIAGNOSIS — M25.552 HIP PAIN, LEFT: Primary | ICD-10-CM

## 2024-04-08 PROCEDURE — 99213 OFFICE O/P EST LOW 20 MIN: CPT | Performed by: FAMILY MEDICINE

## 2024-04-08 RX ORDER — RESPIRATORY SYNCYTIAL VIRUS VACCINE 120MCG/0.5
0.5 KIT INTRAMUSCULAR ONCE
Qty: 1 EACH | Refills: 0 | Status: CANCELLED | OUTPATIENT
Start: 2024-04-08 | End: 2024-04-08

## 2024-04-08 RX ORDER — MELOXICAM 15 MG/1
15 TABLET ORAL DAILY
Qty: 30 TABLET | Refills: 0 | Status: SHIPPED | OUTPATIENT
Start: 2024-04-08

## 2024-04-08 ASSESSMENT — PAIN SCALES - GENERAL: PAINLEVEL: MILD PAIN (3)

## 2024-04-08 NOTE — PROGRESS NOTES
Assessment & Plan     Hip pain, left  Duane is a 73-year-old male comes in today with bilateral anterior hip pain.  Symptoms began about a month ago after he was cutting brush with a gas powered .  This requires that he was holding the  in front of him in a flexed position with recurrent side-to-side rotation.  He was doing this for several hours per day.  In between he was bending over and picking up brush and stacking brush.  At the end of the week of doing brush clearing he then drove to [place].  When he got to [place] he noticed quite a bit of tenderness into his hip flexors bilaterally.  He has not taken anything for his symptoms.  He has not tried ice or heat.  He admits that his symptoms are improving slightly.  He made the appointment to see me when he was down in [place].    On exam today his hip joints show full range of motion.  He has tenderness with palpation of the insertion of the hip flexors on the anterior iliac crests bilaterally.  There is no tenderness over the greater trochanter or the trochanteric bursa.    Assessment: Bilateral hip flexor repetitive use strain.  Start with once daily Mobic taken with meals.  If not improving in 2 weeks would refer to physical therapy for stretching.  If not improved after that would consider further imaging.  - meloxicam (MOBIC) 15 MG tablet; Take 1 tablet (15 mg) by mouth daily    Hip pain, right  Duane is a 73-year-old male comes in today with bilateral anterior hip pain.  Symptoms began about a month ago after he was cutting brush with a gas powered .  This requires that he was holding the  in front of him in a flexed position with recurrent side-to-side rotation.  He was doing this for several hours per day.  In between he was bending over and picking up brush and stacking brush.  At the end of the week of doing brush clearing he then drove to [place].  When he got to [place] he noticed quite a bit of  "tenderness into his hip flexors bilaterally.  He has not taken anything for his symptoms.  He has not tried ice or heat.  He admits that his symptoms are improving slightly.  He made the appointment to see me when he was down in [place].    On exam today his hip joints show full range of motion.  He has tenderness with palpation of the insertion of the hip flexors on the anterior iliac crests bilaterally.  There is no tenderness over the greater trochanter or the trochanteric bursa.    Assessment: Bilateral hip flexor repetitive use strain.  Start with once daily Mobic taken with meals.  If not improving in 2 weeks would refer to physical therapy for stretching.  If not improved after that would consider further imaging.  - meloxicam (MOBIC) 15 MG tablet; Take 1 tablet (15 mg) by mouth daily    Prescription drug management        BMI  Estimated body mass index is 25.84 kg/m  as calculated from the following:    Height as of this encounter: 1.753 m (5' 9\").    Weight as of this encounter: 79.4 kg (175 lb).             Subjective Duane is a 73 year old, presenting for the following health issues:  Musculoskeletal Problem      4/8/2024     1:18 PM   Additional Questions   Roomed by Gene Ni CMA     Musculoskeletal Problem    History of Present Illness       Reason for visit:  Hip pain  Symptom onset:  More than a month  Symptoms include:  Pain side of hips  Symptom intensity:  Moderate  Symptom progression:  Staying the same  Had these symptoms before:  No  What makes it worse:  Walking  What makes it better:  No    He eats 0-1 servings of fruits and vegetables daily.He consumes 0 sweetened beverage(s) daily.He exercises with enough effort to increase his heart rate 20 to 29 minutes per day.  He exercises with enough effort to increase his heart rate 5 days per week.   He is taking medications regularly.         Patient Active Problem List   Diagnosis    Varicose veins    CARDIOVASCULAR SCREENING; LDL GOAL LESS " "THAN 160    Erectile dysfunction, unspecified erectile dysfunction type    Peyronie's disease    History of dysplastic nevus       Current Outpatient Medications   Medication Sig Dispense Refill    neomycin-polymixin-dexAMETHasone (MAXITROL) 0.1 % ophthalmic suspension 2 drops both eyes 4 times daily for 3 days. (Patient not taking: Reported on 4/8/2024) 5 mL 0             Review of Systems  CONSTITUTIONAL: NEGATIVE for fever, chills, change in weight  ENT/MOUTH: NEGATIVE for ear, mouth and throat problems  RESP: NEGATIVE for significant cough or SOB  CV: NEGATIVE for chest pain, palpitations or peripheral edema  MUSCULOSKELETAL: POSITIVE  for joint pain bilateral anterior hips  ROS otherwise negative      Objective    /80 (BP Location: Right arm, Patient Position: Chair, Cuff Size: Adult Regular)   Pulse 78   Temp 98.2  F (36.8  C) (Temporal)   Resp 12   Ht 1.753 m (5' 9\")   Wt 79.4 kg (175 lb)   SpO2 98%   BMI 25.84 kg/m    Body mass index is 25.84 kg/m .  Physical Exam   GENERAL: alert and no distress  NECK: no adenopathy, no asymmetry, masses, or scars  RESP: lungs clear to auscultation - no rales, rhonchi or wheezes  CV: regular rate and rhythm, normal S1 S2, no S3 or S4, no murmur, click or rub, no peripheral edema  ABDOMEN: soft, nontender, no hepatosplenomegaly, no masses and bowel sounds normal  MS: Bilateral hips show full range of motion without pain.  No tenderness over either left or right greater trochanter.  Tenderness with resisted flexion of the bilateral hip flexors on the's anterior superior iliac crest.  No SI joint tenderness and no tenderness into the buttocks.            Signed Electronically by: Riccardo Levy MD    "

## 2024-05-13 NOTE — PATIENT INSTRUCTIONS

## 2024-05-13 NOTE — PROGRESS NOTES
[facility] Dermatology Note  Encounter Date: May 16, 2024  Office Visit     Dermatology Problem List:  Skin check 05/16/24, recommend yearly  1. Actinic Keratosis   AK - left mid helix - bx 4/1/22  -s/p cryotherapy  2. History of atypical/dysplastic nevi  -Junctional dysplastic nevus with mild atypia, left scapula, bx 3/26/21  -Lentiginous compound melanocytic nevus with moderate to severe atypia, central posterior neck s/p biopsy 10/16/2019 s/p excision 12/02/2019  - Junctional nevus with atypical features, right (lateral) thigh, s/p excision 12/20/2016  - Junctional nevus with moderate dysplasia, lower paraspinal back, s/p biopsy 11/14/2016  - Junctional dysplastic nevus with mild atypia, left chest, s/p biopsy 4/10/2015  - Lentiginous compound melanocytic nevus with mild atypia, upper back, s/p biopsy 4/10/2015  - Patient reports two prior atypical lesions in 1980  3. Neurofibroma, upper mid back, s/p biopsy 11/9/2015  4. Folliculitis  5. Benign biopsies  - Macular seborrheic keratosis, left mid paraspinal back, bx 3/26/21    ____________________________________________    Assessment & Plan:    # History of dysplastic nevi, no clinical evidence of recurrence noted today.   - ABCDEs: Counseled ABCDEs of melanoma: Asymmetry, Border (irregularity), Color (not uniform, changes in color), Diameter (greater than 6 mm which is about the size of a pencil eraser), and Evolving (any changes in preexisting moles).  - Sun protection: Counseled SPF30+ sunscreen, UPF clothing, sun avoidance, tanning bed avoidance.  - Recommended regular skin exams.     # Multiple clinically benign nevi on the trunk and extremities.   - No treatment necessary unless lesions become symptomatic or exhibit changes.        Procedures Performed:   none    Follow-up: 1 year(s) in-person, or earlier for new or changing lesions    Staff and Scribe:     Scribe Disclosure:   JEANIE, Leila Agrawal, am serving as a scribe to document  services personally performed by Lulú Fish MD based on data collection and the provider's statements to me.     Provider Disclosure:   The documentation recorded by the scribe accurately reflects the services I personally performed and the decisions made by me.    Lulú Fish MD    Department of Dermatology  [facility]: Phone: [phone], Fax:[phone]  [facility]: Phone: [phone], Fax: [phone]   ____________________________________________    CC: Skin Check (Full body skin check. No spots of concern. Personal history of AK and atypical nevi/DN.)    HPI:  Mr. Duane W Dahlstrom is a(n) 73 year old male who presents today as a return patient for skin check.    Today, patient reports Nothing bleeding, crusting, or changing. No areas of concern.       Patient is otherwise feeling well, without additional skin concerns.    Labs Reviewed:  N/A    Physical Exam:  Vitals: There were no vitals taken for this visit.  SKIN: Total skin excluding the undergarment areas was performed. The exam included the head/face, neck, both arms, chest, back, abdomen, both legs, digits and/or nails. Declines undergarment exam. Mouna Collier EMT     - Multiple regular brown pigmented macules and papules are identified on the trunk and extremities.   - There is no erythema, telangectasias, nodularity, or pigmentation on the site of prior DNs..   - No other lesions of concern on areas examined.     Medications:  Current Outpatient Medications   Medication Sig Dispense Refill    meloxicam (MOBIC) 15 MG tablet Take 1 tablet (15 mg) by mouth daily 30 tablet 0    neomycin-polymixin-dexAMETHasone (MAXITROL) 0.1 % ophthalmic suspension 2 drops both eyes 4 times daily for 3 days. (Patient not taking: Reported on 4/8/2024) 5 mL 0     No current facility-administered medications for this visit.      Past Medical  History:   Patient Active Problem List   Diagnosis    Varicose veins    CARDIOVASCULAR SCREENING; LDL GOAL LESS THAN 160    Erectile dysfunction, unspecified erectile dysfunction type    Peyronie's disease    History of dysplastic nevus     Past Medical History:   Diagnosis Date    Allergic rhinitis, cause unspecified     Seasonal rhinitis    Syncope and collapse 1998    Varicose veins of leg         CC No referring provider defined for this encounter. on close of this encounter.

## 2024-05-16 ENCOUNTER — OFFICE VISIT (OUTPATIENT)
Dept: DERMATOLOGY | Facility: CLINIC | Age: 74
End: 2024-05-16
Payer: COMMERCIAL

## 2024-05-16 DIAGNOSIS — Z86.018 HISTORY OF DYSPLASTIC NEVUS: Primary | ICD-10-CM

## 2024-05-16 PROCEDURE — 99213 OFFICE O/P EST LOW 20 MIN: CPT | Performed by: DERMATOLOGY

## 2024-05-16 NOTE — LETTER
5/16/2024         RE: Duane W Dahlstrom  [address]  [facility] [address]        Dear Colleague,    Thank you for referring your patient, Duane W Dahlstrom, to the [facility]. Please see a copy of my visit note below.      [facility] Dermatology Note  Encounter Date: May 16, 2024  Office Visit     Dermatology Problem List:  Skin check 05/16/24, recommend yearly  1. Actinic Keratosis   AK - left mid helix - bx 4/1/22  -s/p cryotherapy  2. History of atypical/dysplastic nevi  -Junctional dysplastic nevus with mild atypia, left scapula, bx 3/26/21  -Lentiginous compound melanocytic nevus with moderate to severe atypia, central posterior neck s/p biopsy 10/16/2019 s/p excision 12/02/2019  - Junctional nevus with atypical features, right (lateral) thigh, s/p excision 12/20/2016  - Junctional nevus with moderate dysplasia, lower paraspinal back, s/p biopsy 11/14/2016  - Junctional dysplastic nevus with mild atypia, left chest, s/p biopsy 4/10/2015  - Lentiginous compound melanocytic nevus with mild atypia, upper back, s/p biopsy 4/10/2015  - Patient reports two prior atypical lesions in 1980  3. Neurofibroma, upper mid back, s/p biopsy 11/9/2015  4. Folliculitis  5. Benign biopsies  - Macular seborrheic keratosis, left mid paraspinal back, bx 3/26/21    ____________________________________________    Assessment & Plan:    # History of dysplastic nevi, no clinical evidence of recurrence noted today.   - ABCDEs: Counseled ABCDEs of melanoma: Asymmetry, Border (irregularity), Color (not uniform, changes in color), Diameter (greater than 6 mm which is about the size of a pencil eraser), and Evolving (any changes in preexisting moles).  - Sun protection: Counseled SPF30+ sunscreen, UPF clothing, sun avoidance, tanning bed avoidance.  - Recommended regular skin exams.     # Multiple clinically benign nevi on the trunk and extremities.   - No treatment necessary unless  lesions become symptomatic or exhibit changes.        Procedures Performed:   none    Follow-up: 1 year(s) in-person, or earlier for new or changing lesions    Staff and Scribe:     Scribe Disclosure:   I, Leila Agrawal, am serving as a scribe to document services personally performed by Lulú Fish MD based on data collection and the provider's statements to me.     Provider Disclosure:   The documentation recorded by the scribe accurately reflects the services I personally performed and the decisions made by me.    Lulú Fish MD    Department of Dermatology  [facility]: Phone: [phone], Fax:[phone]  [facility]: Phone: [phone], Fax: [phone]   ____________________________________________    CC: Skin Check (Full body skin check. No spots of concern. Personal history of AK and atypical nevi/DN.)    HPI:  Mr. Duane W Dahlstrom is a(n) 73 year old male who presents today as a return patient for skin check.    Today, patient reports Nothing bleeding, crusting, or changing. No areas of concern.       Patient is otherwise feeling well, without additional skin concerns.    Labs Reviewed:  N/A    Physical Exam:  Vitals: There were no vitals taken for this visit.  SKIN: Total skin excluding the undergarment areas was performed. The exam included the head/face, neck, both arms, chest, back, abdomen, both legs, digits and/or nails. Declines undergarment exam. Mouna Collier EMT     - Multiple regular brown pigmented macules and papules are identified on the trunk and extremities.   - There is no erythema, telangectasias, nodularity, or pigmentation on the site of prior DNs..   - No other lesions of concern on areas examined.     Medications:  Current Outpatient Medications   Medication Sig Dispense Refill     meloxicam (MOBIC) 15 MG tablet Take 1 tablet (15 mg) by mouth daily 30  tablet 0     neomycin-polymixin-dexAMETHasone (MAXITROL) 0.1 % ophthalmic suspension 2 drops both eyes 4 times daily for 3 days. (Patient not taking: Reported on 4/8/2024) 5 mL 0     No current facility-administered medications for this visit.      Past Medical History:   Patient Active Problem List   Diagnosis     Varicose veins     CARDIOVASCULAR SCREENING; LDL GOAL LESS THAN 160     Erectile dysfunction, unspecified erectile dysfunction type     Peyronie's disease     History of dysplastic nevus     Past Medical History:   Diagnosis Date     Allergic rhinitis, cause unspecified     Seasonal rhinitis     Syncope and collapse 1998     Varicose veins of leg         CC No referring provider defined for this encounter. on close of this encounter.      Again, thank you for allowing me to participate in the care of your patient.        Sincerely,        Lulú Fish MD

## 2024-05-16 NOTE — NURSING NOTE
Duane W Dahlstrom's goals for this visit include:   Chief Complaint   Patient presents with    Skin Check     Full body skin check. No spots of concern. Personal history of AK and atypical nevi/DN.       He requests these members of his care team be copied on today's visit information:     PCP: Riccardo Levy    Referring Provider:  No referring provider defined for this encounter.    There were no vitals taken for this visit.    Do you need any medication refills at today's visit? Uma Leary, [facility]

## 2024-11-06 ENCOUNTER — OFFICE VISIT (OUTPATIENT)
Dept: FAMILY MEDICINE | Facility: CLINIC | Age: 74
End: 2024-11-06
Payer: COMMERCIAL

## 2024-11-06 VITALS
OXYGEN SATURATION: 99 % | RESPIRATION RATE: 16 BRPM | SYSTOLIC BLOOD PRESSURE: 152 MMHG | BODY MASS INDEX: 25.03 KG/M2 | HEIGHT: 69 IN | WEIGHT: 169 LBS | DIASTOLIC BLOOD PRESSURE: 98 MMHG | HEART RATE: 89 BPM | TEMPERATURE: 98.4 F

## 2024-11-06 DIAGNOSIS — Z00.00 ENCOUNTER FOR MEDICARE ANNUAL WELLNESS EXAM: Primary | ICD-10-CM

## 2024-11-06 DIAGNOSIS — Z12.5 SCREENING FOR PROSTATE CANCER: ICD-10-CM

## 2024-11-06 LAB
ANION GAP SERPL CALCULATED.3IONS-SCNC: 13 MMOL/L (ref 7–15)
BUN SERPL-MCNC: 21.1 MG/DL (ref 8–23)
CALCIUM SERPL-MCNC: 9.4 MG/DL (ref 8.8–10.4)
CHLORIDE SERPL-SCNC: 102 MMOL/L (ref 98–107)
CHOLEST SERPL-MCNC: 180 MG/DL
CREAT SERPL-MCNC: 1.22 MG/DL (ref 0.67–1.17)
EGFRCR SERPLBLD CKD-EPI 2021: 63 ML/MIN/1.73M2
ERYTHROCYTE [DISTWIDTH] IN BLOOD BY AUTOMATED COUNT: 12.7 % (ref 10–15)
FASTING STATUS PATIENT QL REPORTED: NO
FASTING STATUS PATIENT QL REPORTED: NO
GLUCOSE SERPL-MCNC: 109 MG/DL (ref 70–99)
HCO3 SERPL-SCNC: 24 MMOL/L (ref 22–29)
HCT VFR BLD AUTO: 40.7 % (ref 40–53)
HDLC SERPL-MCNC: 75 MG/DL
HGB BLD-MCNC: 14.3 G/DL (ref 13.3–17.7)
LDLC SERPL CALC-MCNC: 92 MG/DL
MCH RBC QN AUTO: 33.3 PG (ref 26.5–33)
MCHC RBC AUTO-ENTMCNC: 35.1 G/DL (ref 31.5–36.5)
MCV RBC AUTO: 95 FL (ref 78–100)
NONHDLC SERPL-MCNC: 105 MG/DL
PLATELET # BLD AUTO: 238 10E3/UL (ref 150–450)
POTASSIUM SERPL-SCNC: 4.4 MMOL/L (ref 3.4–5.3)
PSA SERPL DL<=0.01 NG/ML-MCNC: 2.89 NG/ML (ref 0–6.5)
RBC # BLD AUTO: 4.29 10E6/UL (ref 4.4–5.9)
SODIUM SERPL-SCNC: 139 MMOL/L (ref 135–145)
TRIGL SERPL-MCNC: 65 MG/DL
WBC # BLD AUTO: 7.7 10E3/UL (ref 4–11)

## 2024-11-06 PROCEDURE — G0103 PSA SCREENING: HCPCS | Performed by: FAMILY MEDICINE

## 2024-11-06 PROCEDURE — 36415 COLL VENOUS BLD VENIPUNCTURE: CPT | Performed by: FAMILY MEDICINE

## 2024-11-06 PROCEDURE — 80048 BASIC METABOLIC PNL TOTAL CA: CPT | Performed by: FAMILY MEDICINE

## 2024-11-06 PROCEDURE — G0439 PPPS, SUBSEQ VISIT: HCPCS | Performed by: FAMILY MEDICINE

## 2024-11-06 PROCEDURE — 80061 LIPID PANEL: CPT | Performed by: FAMILY MEDICINE

## 2024-11-06 PROCEDURE — 85027 COMPLETE CBC AUTOMATED: CPT | Mod: GZ | Performed by: FAMILY MEDICINE

## 2024-11-06 SDOH — HEALTH STABILITY: PHYSICAL HEALTH: ON AVERAGE, HOW MANY MINUTES DO YOU ENGAGE IN EXERCISE AT THIS LEVEL?: 30 MIN

## 2024-11-06 SDOH — HEALTH STABILITY: PHYSICAL HEALTH: ON AVERAGE, HOW MANY DAYS PER WEEK DO YOU ENGAGE IN MODERATE TO STRENUOUS EXERCISE (LIKE A BRISK WALK)?: 3 DAYS

## 2024-11-06 ASSESSMENT — SOCIAL DETERMINANTS OF HEALTH (SDOH): HOW OFTEN DO YOU GET TOGETHER WITH FRIENDS OR RELATIVES?: TWICE A WEEK

## 2024-11-06 ASSESSMENT — PAIN SCALES - GENERAL: PAINLEVEL_OUTOF10: NO PAIN (0)

## 2024-11-06 NOTE — PROGRESS NOTES
Preventive Care Visit  [facility]  Riccardo Levy MD, Family Medicine  Nov 6, 2024      Assessment & Plan     Encounter for Medicare annual wellness exam  Duane W Dahlstrom is a 73-year-old male who presents to clinic for his Medicare annual wellness visit.  His past medical history is for the most part unremarkable.  He is on no chronic medications.  He recently developed some nasal congestion for which she is now taking an over-the-counter nasal decongestant, DayQuil.  We note that his blood pressure is elevated today most likely due to this.    All age and gender specific screening recommendations were reviewed.  His last colonoscopy was in March 2021.  He did have a isolated adenomatous polyp.  Recommendation for ongoing 5-year rescreening.    All vaccine recommendations and schedules were reviewed.  He is current with all recommended vaccines having received his seasonal COVID and influenza vaccine earlier.  He is up-to-date with his pneumonia vaccine as well as shingles vaccine and tetanus vaccine.  We discussed risks and benefits of RSV vaccination at some point in time.  He has no underlying chronic pulmonary condition so may not benefit from that vaccine at until later date.  If he chooses to have RSV vaccine he will obtain this through his local pharmacy.    On exam he is a well-developed well-nourished male in no acute distress.  His blood pressure is mildly elevated today at 152/98.  This was rechecked by me at 150/90.  Pulse is 89 and regular.  He is afebrile 98.4.  Respiratory rate 16 with oxygen saturations of 99% on room air.  His weight is stable and a body mass index of 24.92.    HEENT exam: Normocephalic atraumatic.  Pupils are equally round and reactive to light and accommodation.  TMs are clear bilaterally.  Oropharynx is clear, tongue projects midline and palate raises symmetrically.  Neck is supple without adenopathy, thyromegaly or carotid bruit.  Cardiac exam  is regular without murmur rub or gallop.  Lungs are clear to auscultation without wheezing rales or rhonchi.  Abdomen is soft, nontender, nondistended with good bowel sounds throughout.  No organomegaly.  Extremities no clubbing cyanosis or edema.  Neurologic exam is nonfocal.  Mood and affect are good.    Laboratories:  Results for orders placed or performed in visit on 11/06/24   Basic metabolic panel  (Ca, Cl, CO2, Creat, Gluc, K, Na, BUN)     Status: Abnormal   Result Value Ref Range    Sodium 139 135 - 145 mmol/L    Potassium 4.4 3.4 - 5.3 mmol/L    Chloride 102 98 - 107 mmol/L    Carbon Dioxide (CO2) 24 22 - 29 mmol/L    Anion Gap 13 7 - 15 mmol/L    Urea Nitrogen 21.1 8.0 - 23.0 mg/dL    Creatinine 1.22 (H) 0.67 - 1.17 mg/dL    GFR Estimate 63 >60 mL/min/1.73m2    Calcium 9.4 8.8 - 10.4 mg/dL    Glucose 109 (H) 70 - 99 mg/dL    Patient Fasting > 8hrs? No    Lipid panel reflex to direct LDL Fasting     Status: None   Result Value Ref Range    Cholesterol 180 <200 mg/dL    Triglycerides 65 <150 mg/dL    Direct Measure HDL 75 >=40 mg/dL    LDL Cholesterol Calculated 92 <100 mg/dL    Non HDL Cholesterol 105 <130 mg/dL    Patient Fasting > 8hrs? No     Narrative    Cholesterol  Desirable: < 200 mg/dL  Borderline High: 200 - 239 mg/dL  High: >= 240 mg/dL    Triglycerides  Normal: < 150 mg/dL  Borderline High: 150 - 199 mg/dL  High: 200-499 mg/dL  Very High: >= 500 mg/dL    Direct Measure HDL  Female: >= 50 mg/dL   Male: >= 40 mg/dL    LDL Cholesterol  Desirable: < 100 mg/dL  Above Desirable: 100 - 129 mg/dL   Borderline High: 130 - 159 mg/dL   High:  160 - 189 mg/dL   Very High: >= 190 mg/dL    Non HDL Cholesterol  Desirable: < 130 mg/dL  Above Desirable: 130 - 159 mg/dL  Borderline High: 160 - 189 mg/dL  High: 190 - 219 mg/dL  Very High: >= 220 mg/dL   PSA, screen     Status: Normal   Result Value Ref Range    Prostate Specific Antigen Screen 2.89 0.00 - 6.50 ng/mL    Narrative    This result is obtained using the  Roche Elecsys total PSA method on the tristan e601 immunoassay analyzer, which is an ultrasensitive method. Results obtained with different assay methods or kits cannot be used interchangeably.  This test is intended for initial prostate cancer screening. PSA values exceeding the age-specific limits are suspicious for prostate disease, but additional testing, such as prostate biopsy, is needed to diagnose prostate pathology. The American Cancer Society recommends annual examination with digital rectal examination and serum PSA beginning at age 50 and for men with a life expectancy of at least 10 years after detection of prostate cancer. For men in high-risk groups, such as  Americans or men with a first-degree relative diagnosed at a younger age, testing should begin at a younger age. It is generally recommended that information be provided to patients about the benefits and limitations of testing and treatment so they can make informed decisions.   CBC with platelets     Status: Abnormal   Result Value Ref Range    WBC Count 7.7 4.0 - 11.0 10e3/uL    RBC Count 4.29 (L) 4.40 - 5.90 10e6/uL    Hemoglobin 14.3 13.3 - 17.7 g/dL    Hematocrit 40.7 40.0 - 53.0 %    MCV 95 78 - 100 fL    MCH 33.3 (H) 26.5 - 33.0 pg    MCHC 35.1 31.5 - 36.5 g/dL    RDW 12.7 10.0 - 15.0 %    Platelet Count 238 150 - 450 10e3/uL     Lipid profile, PSA and hemoglobin are stable.  Renal profile shows slight increase in creatinine to 1.22.  He admits that he is not a good drinker of water.  At this point I would recommend continued attempts at hydration throughout the day.  Suggested 4 to 6 glasses of water daily.    Assessment: 73-year-old male who presents to clinic for Medicare annual wellness visit.  He is current and compliant with all screening recommendations as well as vaccinations.  His blood pressure is mildly elevated today.  He is currently on a over-the-counter decongestant for cold symptoms which are resolving.  Have asked  him to discontinue the over-the-counter cold preparation and then check his blood pressure 2 days later and report these back to me.    Plan: Continue current medications and plan.  Recheck blood pressure off of his over-the-counter decongestant cold medication.  [email or web address]  Continue regular exercise and diet with encouragement of daily fluids.  - Basic metabolic panel  (Ca, Cl, CO2, Creat, Gluc, K, Na, BUN); Future  - Lipid panel reflex to direct LDL Fasting; Future  - CBC with platelets; Future    Screening for prostate cancer  Stable.  - PSA, screen; Future    Patient has been advised of split billing requirements and indicates understanding: Yes    Results for orders placed or performed in visit on 11/06/24   Basic metabolic panel  (Ca, Cl, CO2, Creat, Gluc, K, Na, BUN)     Status: Abnormal   Result Value Ref Range    Sodium 139 135 - 145 mmol/L    Potassium 4.4 3.4 - 5.3 mmol/L    Chloride 102 98 - 107 mmol/L    Carbon Dioxide (CO2) 24 22 - 29 mmol/L    Anion Gap 13 7 - 15 mmol/L    Urea Nitrogen 21.1 8.0 - 23.0 mg/dL    Creatinine 1.22 (H) 0.67 - 1.17 mg/dL    GFR Estimate 63 >60 mL/min/1.73m2    Calcium 9.4 8.8 - 10.4 mg/dL    Glucose 109 (H) 70 - 99 mg/dL    Patient Fasting > 8hrs? No    Lipid panel reflex to direct LDL Fasting     Status: None   Result Value Ref Range    Cholesterol 180 <200 mg/dL    Triglycerides 65 <150 mg/dL    Direct Measure HDL 75 >=40 mg/dL    LDL Cholesterol Calculated 92 <100 mg/dL    Non HDL Cholesterol 105 <130 mg/dL    Patient Fasting > 8hrs? No     Narrative    Cholesterol  Desirable: < 200 mg/dL  Borderline High: 200 - 239 mg/dL  High: >= 240 mg/dL    Triglycerides  Normal: < 150 mg/dL  Borderline High: 150 - 199 mg/dL  High: 200-499 mg/dL  Very High: >= 500 mg/dL    Direct Measure HDL  Female: >= 50 mg/dL   Male: >= 40 mg/dL    LDL Cholesterol  Desirable: < 100 mg/dL  Above Desirable: 100 - 129 mg/dL   Borderline High: 130 - 159 mg/dL   High:  160 - 189  mg/dL   Very High: >= 190 mg/dL    Non HDL Cholesterol  Desirable: < 130 mg/dL  Above Desirable: 130 - 159 mg/dL  Borderline High: 160 - 189 mg/dL  High: 190 - 219 mg/dL  Very High: >= 220 mg/dL   PSA, screen     Status: Normal   Result Value Ref Range    Prostate Specific Antigen Screen 2.89 0.00 - 6.50 ng/mL    Narrative    This result is obtained using the Roche Elecsys total PSA method on the tristan e601 immunoassay analyzer, which is an ultrasensitive method. Results obtained with different assay methods or kits cannot be used interchangeably.  This test is intended for initial prostate cancer screening. PSA values exceeding the age-specific limits are suspicious for prostate disease, but additional testing, such as prostate biopsy, is needed to diagnose prostate pathology. The American Cancer Society recommends annual examination with digital rectal examination and serum PSA beginning at age 50 and for men with a life expectancy of at least 10 years after detection of prostate cancer. For men in high-risk groups, such as  Americans or men with a first-degree relative diagnosed at a younger age, testing should begin at a younger age. It is generally recommended that information be provided to patients about the benefits and limitations of testing and treatment so they can make informed decisions.   CBC with platelets     Status: Abnormal   Result Value Ref Range    WBC Count 7.7 4.0 - 11.0 10e3/uL    RBC Count 4.29 (L) 4.40 - 5.90 10e6/uL    Hemoglobin 14.3 13.3 - 17.7 g/dL    Hematocrit 40.7 40.0 - 53.0 %    MCV 95 78 - 100 fL    MCH 33.3 (H) 26.5 - 33.0 pg    MCHC 35.1 31.5 - 36.5 g/dL    RDW 12.7 10.0 - 15.0 %    Platelet Count 238 150 - 450 10e3/uL         Counseling  Appropriate preventive services were addressed with this patient via screening, questionnaire, or discussion as appropriate for fall prevention, nutrition, physical activity, Tobacco-use cessation, social engagement, weight loss and  cognition.  Checklist reviewing preventive services available has been given to the patient.  Reviewed patient's diet, addressing concerns and/or questions.   He is at risk for lack of exercise and has been provided with information to increase physical activity for the benefit of his well-being.       FUTURE APPOINTMENTS:       - MyChart message BP recheck after discontinuing Dayquil  SELF MONITORING:       - Please check blood pressure readings daily  Work on weight loss  Regular exercise   Follow-up Visit   Expected date:  Nov 13, 2025 (Approximate)      Follow Up Appointment Details:     Follow-up with whom?: PCP    Follow-Up for what?: Medicare Wellness    Welcome or Annual?: Annual Wellness    How?: In Person                 Subjective Duane is a 73 year old, presenting for the following:  Wellness Visit        11/6/2024     4:28 PM   Additional Questions   Roomed by Ginger ZUNIGA          Health Care Directive  Patient has a Health Care Directive on file  Advance care planning document is on file and is current.      11/6/2024   General Health   How would you rate your overall physical health? Good   Feel stress (tense, anxious, or unable to sleep) Not at all            11/6/2024   Nutrition   Diet: Regular (no restrictions)            11/6/2024   Exercise   Days per week of moderate/strenous exercise 3 days   Average minutes spent exercising at this level 30 min            11/6/2024   Social Factors   Frequency of gathering with friends or relatives Twice a week   Worry food won't last until get money to buy more No   Food not last or not have enough money for food? No   Do you have housing? (Housing is defined as stable permanent housing and does not include staying ouside in a car, in a tent, in an abandoned building, in an overnight shelter, or couch-surfing.) Yes   Are you worried about losing your housing? No   Lack of transportation? No   Unable to get utilities (heat,electricity)? No             11/6/2024   Fall Risk   Fallen 2 or more times in the past year? No    Trouble with walking or balance? No        Patient-reported          11/6/2024   Activities of Daily Living- Home Safety   Needs help with the following daily activites None of the above   Safety concerns in the home None of the above            11/6/2024   Dental   Dentist two times every year? Yes            11/6/2024   Hearing Screening   Hearing concerns? None of the above            11/6/2024   Driving Risk Screening   Patient/family members have concerns about driving No            11/6/2024   General Alertness/Fatigue Screening   Have you been more tired than usual lately? No            11/6/2024   Urinary Incontinence Screening   Bothered by leaking urine in past 6 months No            10/23/2024   TB Screening   Were you born outside of the [place]? No            Today's PHQ-2 Score:       11/6/2024     8:21 AM   PHQ-2 ( 1999 Pfizer)   Q1: Little interest or pleasure in doing things 0    Q2: Feeling down, depressed or hopeless 0    PHQ-2 Score 0    Q1: Little interest or pleasure in doing things Not at all   Q2: Feeling down, depressed or hopeless Not at all   PHQ-2 Score 0       Patient-reported           11/6/2024   Substance Use   Alcohol more than 3/day or more than 7/wk No   Do you have a current opioid prescription? No   How severe/bad is pain from 1 to 10? 0/10 (No Pain)   Do you use any other substances recreationally? No        Social History     Tobacco Use    Smoking status: Never    Smokeless tobacco: Never   Vaping Use    Vaping status: Never Used   Substance Use Topics    Alcohol use: Yes     Comment: social     Drug use: No           11/6/2024   AAA Screening   Family history of Abdominal Aortic Aneurysm (AAA)? No      ASCVD Risk   The 10-year ASCVD risk score (Alize ALANIZ, et al., 2019) is: 21.4%    Values used to calculate the score:      Age: 73 years      Sex: Male      Is Non- : No       Diabetic: No      Tobacco smoker: No      Systolic Blood Pressure: 152 mmHg      Is BP treated: No      HDL Cholesterol: 67 mg/dL      Total Cholesterol: 178 mg/dL            Reviewed and updated as needed this visit by Provider                    Lab work is in process  Labs reviewed in EPIC  BP Readings from Last 3 Encounters:   11/06/24 (!) 152/98   04/08/24 120/80   10/16/23 132/80    Wt Readings from Last 3 Encounters:   11/06/24 76.7 kg (169 lb)   04/08/24 79.4 kg (175 lb)   10/16/23 76.5 kg (168 lb 11.2 oz)                  Patient Active Problem List   Diagnosis    Varicose veins    CARDIOVASCULAR SCREENING; LDL GOAL LESS THAN 160    Erectile dysfunction, unspecified erectile dysfunction type    Peyronie's disease    History of dysplastic nevus     Past Surgical History:   Procedure Laterality Date    COLONOSCOPY  12/01/08    COLONOSCOPY N/A 11/19/2015    Procedure: COLONOSCOPY;  Surgeon: Alonso Gaitan MD;  Location: [facility]    COLONOSCOPY N/A 3/22/2021    Procedure: COLONOSCOPY, WITH POLYPECTOMY by forcep;  Surgeon: Jose Ni MD;  Location: [facility]    ENDOSCOPIC LIGATION VEIN(S)      [facility] COLONOSCOPY THRU STOMA WITH BIOPSY  1996       Social History     Tobacco Use    Smoking status: Never    Smokeless tobacco: Never   Substance Use Topics    Alcohol use: Yes     Comment: social      Family History   Problem Relation Age of Onset    Cancer - colorectal Father     Prostate Cancer Father     Heart Disease Father         by-pass surgery in 1993         Current Outpatient Medications   Medication Sig Dispense Refill    meloxicam (MOBIC) 15 MG tablet Take 1 tablet (15 mg) by mouth daily (Patient not taking: Reported on 11/6/2024) 30 tablet 0    neomycin-polymixin-dexAMETHasone (MAXITROL) 0.1 % ophthalmic suspension 2 drops both eyes 4 times daily for 3 days. (Patient not taking: Reported on 11/6/2024) 5 mL 0     Allergies   Allergen Reactions    No Known Drug Allergy      Current providers  "sharing in care for this patient include:  Patient Care Team:  Riccardo Levy MD as PCP - General (Family Practice)  Riccardo Levy MD as Assigned PCP  Lulú Fish MD as Assigned Surgical Provider    The following health maintenance items are reviewed in Epic and correct as of today:  Health Maintenance   Topic Date Due    ANNUAL REVIEW OF HM ORDERS  10/16/2024    MEDICARE ANNUAL WELLNESS VISIT  10/16/2024    FALL RISK ASSESSMENT  11/06/2025    COLORECTAL CANCER SCREENING  03/22/2026    GLUCOSE  10/20/2026    RSV VACCINE (1 - 1-dose 75+ series) 12/09/2027    ADVANCE CARE PLANNING  10/16/2028    LIPID  10/20/2028    DTAP/TDAP/TD IMMUNIZATION (3 - Td or Tdap) 10/12/2030    HEPATITIS C SCREENING  Completed    PHQ-2 (once per calendar year)  Completed    INFLUENZA VACCINE  Completed    Pneumococcal Vaccine: 65+ Years  Completed    ZOSTER IMMUNIZATION  Completed    COVID-19 Vaccine  Completed    HPV IMMUNIZATION  Aged Out    MENINGITIS IMMUNIZATION  Aged Out    RSV MONOCLONAL ANTIBODY  Aged Out         Review of Systems  CONSTITUTIONAL: NEGATIVE for fever, chills, change in weight  ENT/MOUTH: NEGATIVE for ear, mouth and throat problems  RESP: NEGATIVE for significant cough or SOB  CV: NEGATIVE for chest pain, palpitations or peripheral edema  ROS otherwise negative     Objective    Exam  BP (!) 152/98   Pulse 89   Temp 98.4  F (36.9  C) (Temporal)   Resp 16   Ht 1.754 m (5' 9.06\")   Wt 76.7 kg (169 lb)   SpO2 99%   BMI 24.92 kg/m     Estimated body mass index is 24.92 kg/m  as calculated from the following:    Height as of this encounter: 1.754 m (5' 9.06\").    Weight as of this encounter: 76.7 kg (169 lb).    Physical Exam  GENERAL: alert and no distress  HENT: ear canals and TM's normal, nose and mouth without ulcers or lesions  NECK: no adenopathy, no asymmetry, masses, or scars  RESP: lungs clear to auscultation - no rales, rhonchi or wheezes  CV: regular rate and rhythm, normal S1 S2, no S3 or S4, no " murmur, click or rub, no peripheral edema  ABDOMEN: soft, nontender, no hepatosplenomegaly, no masses and bowel sounds normal  MS: no gross musculoskeletal defects noted, no edema  NEURO: Normal strength and tone, mentation intact and speech normal  PSYCH: mentation appears normal, affect normal/bright  LYMPH: no cervical, supraclavicular, axillary, or inguinal adenopathy        11/6/2024   Mini Cog   Clock Draw Score 2 Normal   3 Item Recall 2 objects recalled   Mini Cog Total Score 4                 Signed Electronically by: Riccardo Levy MD

## 2024-11-06 NOTE — PATIENT INSTRUCTIONS
Patient Education   Preventive Care Advice   This is general advice given by our system to help you stay healthy. However, your care team may have specific advice just for you. Please talk to your care team about your preventive care needs.  Nutrition  Eat 5 or more servings of fruits and vegetables each day.  Try wheat bread, brown rice and whole grain pasta (instead of white bread, rice, and pasta).  Get enough calcium and vitamin D. Check the label on foods and aim for 100% of the RDA (recommended daily allowance).  Lifestyle  Exercise at least 150 minutes each week  (30 minutes a day, 5 days a week).  Do muscle strengthening activities 2 days a week. These help control your weight and prevent disease.  No smoking.  Wear sunscreen to prevent skin cancer.  Have a dental exam and cleaning every 6 months.  Yearly exams  See your health care team every year to talk about:  Any changes in your health.  Any medicines your care team has prescribed.  Preventive care, family planning, and ways to prevent chronic diseases.  Shots (vaccines)   HPV shots (up to age 26), if you've never had them before.  Hepatitis B shots (up to age 59), if you've never had them before.  COVID-19 shot: Get this shot when it's due.  Flu shot: Get a flu shot every year.  Tetanus shot: Get a tetanus shot every 10 years.  Pneumococcal, hepatitis A, and RSV shots: Ask your care team if you need these based on your risk.  Shingles shot (for age 50 and up)  General health tests  Diabetes screening:  Starting at age 35, Get screened for diabetes at least every 3 years.  If you are younger than age 35, ask your care team if you should be screened for diabetes.  Cholesterol test: At age 39, start having a cholesterol test every 5 years, or more often if advised.  Bone density scan (DEXA): At age 50, ask your care team if you should have this scan for osteoporosis (brittle bones).  Hepatitis C: Get tested at least once in your life.  STIs (sexually  transmitted infections)  Before age 24: Ask your care team if you should be screened for STIs.  After age 24: Get screened for STIs if you're at risk. You are at risk for STIs (including HIV) if:  You are sexually active with more than one person.  You don't use condoms every time.  You or a partner was diagnosed with a sexually transmitted infection.  If you are at risk for HIV, ask about PrEP medicine to prevent HIV.  Get tested for HIV at least once in your life, whether you are at risk for HIV or not.  Cancer screening tests  Cervical cancer screening: If you have a cervix, begin getting regular cervical cancer screening tests starting at age 21.  Breast cancer scan (mammogram): If you've ever had breasts, begin having regular mammograms starting at age 40. This is a scan to check for breast cancer.  Colon cancer screening: It is important to start screening for colon cancer at age 45.  Have a colonoscopy test every 10 years (or more often if you're at risk) Or, ask your provider about stool tests like a FIT test every year or Cologuard test every 3 years.  To learn more about your testing options, visit:   .  For help making a decision, visit:   https://bit.ly/tr28994.  Prostate cancer screening test: If you have a prostate, ask your care team if a prostate cancer screening test (PSA) at age 55 is right for you.  Lung cancer screening: If you are a current or former smoker ages 50 to 80, ask your care team if ongoing lung cancer screenings are right for you.  For informational purposes only. Not to replace the advice of your health care provider. Copyright   2023 [facility]. All rights reserved. Clinically reviewed by the [facility]. [employer] 762110 - REV 01/24.  Nutrition for Older Adults: Care Instructions  Overview     Good nutrition is important at any age. But it is especially important for older adults. Eating healthy foods helps keep your body strong. And it can  help lower your risk for disease.  As you get older, your body needs more of certain nutrients. These include vitamin B12, calcium, and vitamin D. But it may be harder for you to get these and other important nutrients. This could be for many reasons. You may not feel as hungry as you used to. Or you could have problems with your teeth or mouth that make it hard to chew. Or you may not enjoy planning and preparing meals, especially if you live alone.  Talk with your doctor if you want help getting the most nutrition from what you eat. They may have you work with a dietitian to help you plan meals.  Follow-up care is a key part of your treatment and safety. Be sure to make and go to all appointments, and call your doctor if you are having problems. It's also a good idea to know your test results and keep a list of the medicines you take.  How can you care for yourself at home?  To stay healthy  Eat a variety of foods. The more you vary the foods you eat, the more vitamins, minerals, and other nutrients you get.  Ask your doctor if you should take a multivitamin. Choose one with about 100% of the daily value (DV) for vitamins and minerals. Do not take more than 100% of the daily value for any vitamin or mineral unless your doctor tells you to. Talk with your doctor if you are not sure which multivitamin is right for you.  Try to eat lots of fruits and vegetables. Fresh or frozen vegetables and fruits are healthy choices. Choose canned vegetables that have no salt added and fruits that are canned in their own juice or light syrup.  Include foods that are high in vitamin B12 in your diet. Good choices are fortified breakfast cereal, nonfat or low-fat milk and other dairy products, meat, poultry, fish, and eggs.  Get enough calcium and vitamin D. Good choices include nonfat or low-fat milk, cheese, and yogurt. Other good options are tofu, orange juice with added calcium, and some leafy green vegetables, such as mahin  greens and kale. If you don't use milk products, talk to your doctor about calcium and vitamin D supplements.  Try to eat protein foods every day. Good choices include lean meat, fish, poultry, eggs, and cheese. Other good options are cooked beans, peanut butter, and nuts and seeds.  Choose whole grains for half of the grains you eat. Look for 100% whole wheat bread, whole-grain cereals, brown rice, and other whole grains.  If you have constipation  Eat high-fiber foods every day if you can. These include fruits, vegetables, cooked dried beans, and whole grains.  Drink plenty of fluids. If you have kidney, heart, or liver disease and have to limit fluids, talk with your doctor before you increase the amount of fluids you drink.  Ask your doctor if stool softeners may help keep your bowels regular.  If you have mouth problems that make chewing hard  Pick canned or cooked fruits and vegetables. These are often softer.  Chop or shred meat, poultry, and fish. Add sauce or gravy to the meat to help keep it moist.  Pick other protein foods that are soft. These include cheese, peanut butter, cooked beans, cottage cheese, and eggs.  If you have trouble shopping for yourself  Ask a local food store to deliver groceries to your home.  Contact your local area agency on aging and ask about resources that can help.  Ask a family member or neighbor to help you.  If you have trouble preparing meals  Try easier cooking methods such as using a slow cooker or microwave oven.  Let the grocery store do some of the work for you. Look for precut, washed, and ready-to-eat foods.  Take part in group meal programs. You can find these through senior citizen programs.  Have meals brought to your home. Your community may offer programs that deliver meals, such as Meals on Wheels. Or you could use an online meal delivery service.  If you are able, take a cooking class.  If your appetite is poor  Try to eat meals on a regular schedule. It may  "help to eat smaller meals more often throughout the day.  If you can, eat some meals with other people. You could ask family or friends to eat with you. Or you could take part in group meal programs offered in your community.  Ask your doctor if your medicines could cause appetite or taste problems. If so, ask about changing medicines.  Add spices and herbs to increase the flavor of food.  If you think you are depressed, ask your doctor for help. Depression can affect your appetite. And it can make it hard to do everyday activities like grocery shopping and making meals. Treatment can help.  When should you call for help?  Watch closely for changes in your health, and be sure to contact your doctor if you have any problems.  Where can you learn more?  Go to https://www.[employer].net/patiented  Enter L643 in the search box to learn more about \"Nutrition for Older Adults: Care Instructions.\"  Current as of: September 25, 2023  Content Version: 14.2 2024 [facility].   Care instructions adapted under license by your healthcare professional. If you have questions about a medical condition or this instruction, always ask your healthcare professional. [employer] disclaims any warranty or liability for your use of this information.       "

## 2025-04-07 NOTE — PROGRESS NOTES
[facility] Dermatology Note  Encounter Date: Apr 17, 2025  Office Visit     Reviewed patients past medical history and pertinent chart review prior to patients visit today.     Dermatology Problem List:  Last skin check: 04/17/25    0. NUB right lower back, shave biopsy 04/17/25 .    1. Actinic Keratosis   AK - left mid helix - bx 4/1/22  -s/p cryotherapy  2. History of atypical/dysplastic nevi  -Junctional dysplastic nevus with mild atypia, left scapula, bx 3/26/21  -Lentiginous compound melanocytic nevus with moderate to severe atypia, central posterior neck s/p biopsy 10/16/2019 s/p excision 12/02/2019  - Junctional nevus with atypical features, right (lateral) thigh, s/p excision 12/20/2016  - Junctional nevus with moderate dysplasia, lower paraspinal back, s/p biopsy 11/14/2016  - Junctional dysplastic nevus with mild atypia, left chest, s/p biopsy 4/10/2015  - Lentiginous compound melanocytic nevus with mild atypia, upper back, s/p biopsy 4/10/2015  - Patient reports two prior atypical lesions in 1980  3. Neurofibroma, upper mid back, s/p biopsy 11/9/2015  4. Folliculitis  5. Benign biopsies  - Macular seborrheic keratosis, left mid paraspinal back, bx 3/26/21    Personal Hx: no personal history of skin cancer  Family Hx: none   _________________________________________    Assessment & Plan:     # Neoplasm of uncertain behavior:  right lower back  DDx includes DN vs MM. Shave biopsy today.    Procedure Note: Biopsy by shave technique  The risks and benefits of the procedure were described to the patient. These include but are not limited to bleeding, infection, scar, incomplete removal, and non-diagnostic biopsy. Verbal informed consent was obtained. The above site(s) was cleansed with an alcohol pad and injected with 1% lidocaine with epinephrine. Once anesthesia was obtained, a biopsy(ies) was performed with Gilette blade. The tissue(s) was placed in a labeled container(s) with formalin  "and sent to pathology. Hemostasis was achieved with aluminum chloride. Vaseline and a bandage were applied to the wound(s). The patient tolerated the procedure well and was given post biopsy care instructions.    # Benign skin findings including: seborrheic keratoses, cherry angioma, lentigines and benign nevi.   - No further intervention required. Patient to report changes.   - Patient reassured of the benign nature of these lesions.    #Signs and Symptoms of non-melanoma skin cancer and ABCDEs of melanoma reviewed with patient. Patient encouraged to perform monthly self skin exams and educated on how to perform them. UV precautions reviewed with patient. Patient was asked about new or changing moles/lesions on body.     #Reviewed Sunscreen: Apply 20 minutes prior to going outdoors and reapply every two hours, when wet or sweating. We recommend using an SPF 30 or higher, and to use one that is water resistant.       Follow-up: 1 year for follow up full body skin exam, prn for new or changing lesions or new concerns    Emily Young PA-C  [facility]  Dermatology     ____________________________________________    CC: Skin Check (Pt states, \" Here for a fbse, has a history of AK, but no personal or family hx of skin cancer, has no new areas of concern today\" )    HPI:  Mr. Duane W Dahlstrom is a(n) 74 year old male who presents today as a return patient for a full body skin cancer screening. Patient has no new concerns today. Patient is trying to be diligent with photoprotection.       Patient is otherwise feeling well, without additional skin concerns.     Physical Exam:  Vitals: Ht 1.754 m (5' 9.06\")   Wt 76.7 kg (169 lb)   BMI 24.92 kg/m    SKIN: Total skin excluding the genitalia areas was performed. The exam included the head/face, neck, both arms, chest, back, abdomen, both legs, digits, buttock and nails.   -NUB, right lower back, tan to flesh colored asymmetric papule   -several 1-2mm red dome " shaped symmetric papules scattered on the trunk  -multiple tan/brown flat round macules and raised papules scattered throughout trunk, extremities and head. No worrisome features for malignancy noted on examination.  -scattered tan, homogenous macules scattered on sun exposed areas of trunk, extremities and face.   -scattered waxy, stuck on tan/brown papules and patches on the trunk     - No other lesions of concern on areas examined.     Medications:  No current outpatient medications on file.     No current facility-administered medications for this visit.      Past Medical History:   Patient Active Problem List   Diagnosis    Varicose veins    CARDIOVASCULAR SCREENING; LDL GOAL LESS THAN 160    Erectile dysfunction, unspecified erectile dysfunction type    Peyronie's disease    History of dysplastic nevus     Past Medical History:   Diagnosis Date    Allergic rhinitis, cause unspecified     Seasonal rhinitis    Syncope and collapse 1998    Varicose veins of leg        CC Lulú Fish MD  [address] [place] [address] [address]  [place] [address] on close of this encounter.

## 2025-04-07 NOTE — PATIENT INSTRUCTIONS
Wound Care After a Biopsy    What is a skin biopsy?  A skin biopsy allows the doctor to examine a very small piece of tissue under the microscope to determine the diagnosis and the best treatment for the skin condition. A local anesthetic (numbing medicine)  is injected with a very small needle into the skin area to be tested. A small piece of skin is taken from the area. Sometimes a suture (stitch) is used.     What are the risks of a skin biopsy?  I will experience scar, bleeding, swelling, pain, crusting and redness. I may experience incomplete removal or recurrence. Risks of this procedure are excessive bleeding, bruising, infection, nerve damage, numbness, thick (hypertrophic or keloidal) scar and non-diagnostic biopsy.    How should I care for my wound for the first 24 hours?  Keep the wound dry and covered for 24 hours  If it bleeds, hold direct pressure on the area for 15 minutes. If bleeding does not stop then go to the emergency room  Avoid strenuous exercise the first 1-2 days or as your doctor instructs you    How should I care for the wound after 24 hours?  After 24 hours, remove the bandage  You may bathe or shower as normal  If you had a scalp biopsy, you can shampoo as usual and can use shower water to clean the biopsy site daily  Clean the wound twice a day with gentle soap and water  Do not scrub, be gentle  Apply white petroleum/Vaseline after cleaning the wound with a cotton swab or a clean finger, and keep the site covered with a Bandaid /bandage. Bandages are not necessary with a scalp biopsy  If you are unable to cover the site with a Bandaid /bandage, re-apply ointment 2-3 times a day to keep the site moist. Moisture will help with healing  Avoid strenuous activity for first 1-2 days  Avoid lakes, rivers, pools, and oceans until the stitches are removed or the site is healed    How do I clean my wound?  Wash hands thoroughly with soap or use hand  before all wound care  Clean the  wound with gentle soap and water  Apply white petroleum/Vaseline  to wound after it is clean  Replace the Bandaid /bandage to keep the wound covered for the first few days or as instructed by your doctor  If you had a scalp biopsy, warm shower water to the area on a daily basis should suffice    What should I use to clean my wound?   Cotton-tipped applicators (Qtips )  White petroleum jelly (Vaseline ). Use a clean new container and use Q-tips to apply.  Bandaids   as needed  Gentle soap     How should I care for my wound long term?  Do not get your wound dirty  Keep up with wound care for one week or until the area is healed.  A small scab will form and fall off by itself when the area is completely healed. The area will be red and will become pink in color as it heals. Sun protection is very important for how your scar will turn out. Sunscreen with an SPF 30 or greater is recommended once the area is healed.  If you have stitches, stitches need to be removed in 7 days on the face/neck, or 10-14 days on the body days. You may return to our clinic for this or you may have it done locally at your doctor s office.  You should have some soreness but it should be mild and slowly go away over several days. Talk to your doctor about using tylenol for pain,    When should I call my doctor?  If you have increased:   Pain or swelling  Pus or drainage (clear or slightly yellow drainage is ok)  Temperature over 100F  Spreading redness or warmth around wound    When will I hear about my results?  The biopsy results can take 2 weeks to come back.  Your results will automatically release to [facility] before your provider has even reviewed them.  The clinic will call you with the results, send you a [facility] message, or have you schedule a follow-up clinic or phone time to discuss the results.  Contact our clinics if you do not hear from us in 2 weeks. If further treatment is needed for a skin cancer, this will be done at either our  [place] or [place] office.    Who should I call with questions?  [facility]: [phone]  [facility]: [phone]  For urgent needs outside of business hours call the [facility] at [phone] and ask for the dermatology resident on call   Patient Education       Proper skin care from [facility]:    -Eliminate harsh soaps as they strip the natural oils from the skin, often resulting in dry itchy skin ( i.e. Dial, Zest, [place])  -Use mild soaps such as Cetaphil or Dove Sensitive Skin in the shower. You do not need to use soap on arms, legs, and trunk every time you shower unless visibly soiled.   -Avoid hot or cold showers.  -After showering, lightly dry off and apply moisturizing within 2-3 minutes. This will help trap moisture in the skin.   -Aggressive use of a moisturizer at least 1-2 times a day to the entire body (including -Vanicream, Cetaphil, Aquaphor or Cerave) and moisturize hands after every washing.  -We recommend using moisturizers that come in a tub that needs to be scooped out, not a pump. This has more of an oil base. It will hold moisture in your skin much better than a water base moisturizer. The above recommended are non-pore clogging.      Wear a sunscreen with at least SPF 30 on your face, ears, neck and V of the chest daily. Wear sunscreen on other areas of the body if those areas are exposed to the sun throughout the day. Sunscreens can contain physical and/or chemical blockers. Physical blockers are less likely to clog pores, these include zinc oxide and titanium dioxide. Reapply every two hour and after swimming.     Sunscreen examples: https://www.ewg.org/sunscreen/    UV radiation  UVA radiation remains constant throughout the day and throughout the year. It is a longer wavelength than UVB and therefore penetrates deeper into the skin leading to immediate and delayed tanning, photoaging, and  skin cancer. 70-80% of UVA and UVB radiation occurs between the hours of 10am-2pm.  UVB radiation  UVB radiation causes the most harmful effects and is more significant during the summer months. However, snow and ice can reflect UVB radiation leading to skin damage during the winter months as well. UVB radiation is responsible for tanning, burning, inflammation, delayed erythema (pinkness), pigmentation (brown spots), and skin cancer.     I recommend self monthly full body exams and yearly full body exams with a dermatology provider. If you develop a new or changing lesion please follow up for examination. Most skin cancers are pink and scaly or pink and pearly. However, we do see blue/brown/black skin cancers.  Consider the ABCDEs of melanoma when giving yourself your monthly full body exam ( don't forget the groin, buttocks, feet, toes, etc). A-asymmetry, B-borders, C-color, D-diameter, E-elevation or evolving. If you see any of these changes please follow up in clinic. If you cannot see your back I recommend purchasing a hand held mirror to use with a larger wall mirror.       Checking for Skin Cancer  You can find cancer early by checking your skin each month. There are 3 kinds of skin cancer. They are melanoma, basal cell carcinoma, and squamous cell carcinoma. Doing monthly skin checks is the best way to find new marks or skin changes. Follow the instructions below for checking your skin.   The ABCDEs of checking moles for melanoma   Check your moles or growths for signs of melanoma using ABCDE:   Asymmetry: the sides of the mole or growth don t match  Border: the edges are ragged, notched, or blurred  Color: the color within the mole or growth varies  Diameter: the mole or growth is larger than 6 mm (size of a pencil eraser)  Evolving: the size, shape, or color of the mole or growth is changing (evolving is not shown in the images below)    Checking for other types of skin cancer  Basal cell carcinoma or  squamous cell carcinoma have symptoms such as:     A spot or mole that looks different from all other marks on your skin  Changes in how an area feels, such as itching, tenderness, or pain  Changes in the skin's surface, such as oozing, bleeding, or scaliness  A sore that does not heal  New swelling or redness beyond the border of a mole    Who s at risk?  Anyone can get skin cancer. But you are at greater risk if you have:   Fair skin, light-colored hair, or light-colored eyes  Many moles or abnormal moles on your skin  A history of sunburns from sunlight or tanning beds  A family history of skin cancer  A history of exposure to radiation or chemicals  A weakened immune system  If you have had skin cancer in the past, you are at risk for recurring skin cancer.   How to check your skin  Do your monthly skin checkups in front of a full-length mirror. Check all parts of your body, including your:   Head (ears, face, neck, and scalp)  Torso (front, back, and sides)  Arms (tops, undersides, upper, and lower armpits)  Hands (palms, backs, and fingers, including under the nails)  Buttocks and genitals  Legs (front, back, and sides)  Feet (tops, soles, toes, including under the nails, and between toes)  If you have a lot of moles, take digital photos of them each month. Make sure to take photos both up close and from a distance. These can help you see if any moles change over time.   Most skin changes are not cancer. But if you see any changes in your skin, call your doctor right away. Only he or she can diagnose a problem. If you have skin cancer, seeing your doctor can be the first step toward getting the treatment that could save your life.   [email or web address] last reviewed this educational content on 4/1/2019 2000-2020 [employer]. [address] [facility], [place] [address]. All rights reserved. This information is not intended as a substitute for professional medical care. Always follow your healthcare  professional's instructions.       When should I call my doctor?  If you are worsening or not improving, please, contact us or seek urgent care as noted below.     Who should I call with questions (adults)?  [facility] (adult and pediatric): [phone]  [facility] (adult): [phone]  [facility] [place]) [phone]  For urgent needs outside of business hours call the [facility] at [phone] and ask for the dermatology resident on call to be paged  If this is a medical emergency and you are unable to reach an ER, Call [phone]      If you need a prescription refill, please contact your pharmacy. Refills are approved or denied by our Physicians during normal business hours, Monday through Fridays  Per office policy, refills will not be granted if you have not been seen within the past year (or sooner depending on your child's condition The ABCDEs of Melanoma    Skin cancer can develop anywhere on the skin. Ask someone for help when checking your skin, especially in hard to see places. If you notice a mole different from others, or that changes, enlarges, itches, or bleeds (even if it is small), you should see a dermatologist.

## 2025-04-17 ENCOUNTER — OFFICE VISIT (OUTPATIENT)
Dept: DERMATOLOGY | Facility: CLINIC | Age: 75
End: 2025-04-17
Attending: DERMATOLOGY
Payer: COMMERCIAL

## 2025-04-17 VITALS — WEIGHT: 169 LBS | BODY MASS INDEX: 25.03 KG/M2 | HEIGHT: 69 IN

## 2025-04-17 DIAGNOSIS — Z86.018 HISTORY OF DYSPLASTIC NEVUS: ICD-10-CM

## 2025-04-17 DIAGNOSIS — D18.01 CHERRY ANGIOMA: ICD-10-CM

## 2025-04-17 DIAGNOSIS — L82.1 SEBORRHEIC KERATOSIS: ICD-10-CM

## 2025-04-17 DIAGNOSIS — D48.5 NEOPLASM OF UNCERTAIN BEHAVIOR OF SKIN: Primary | ICD-10-CM

## 2025-04-17 DIAGNOSIS — Z87.2 HISTORY OF ACTINIC KERATOSIS: ICD-10-CM

## 2025-04-17 ASSESSMENT — PAIN SCALES - GENERAL: PAINLEVEL_OUTOF10: NO PAIN (0)

## 2025-04-17 NOTE — PROGRESS NOTES
The following medication was given:     MEDICATION:  Lidocaine with epinephrine 1% [id number]  ROUTE: SQ  SITE: see procedure note  DOSE: 0.5 ml  LOT #: [id number]  : [employer]  EXPIRATION DATE: 11-30-25  NDC#: 5205-2434-68  Was there drug waste? YES, 0.5 ml  Multi-dose vial: Yes    Sierra Johnson MA  April 17, 2025

## 2025-04-17 NOTE — LETTER
4/17/2025      Duane W Dahlstrom  [address]  [facility] [address]      Dear Colleague,    Thank you for referring your patient, Duane W Dahlstrom, to the [facility]. Please see a copy of my visit note below.    [facility] Dermatology Note  Encounter Date: Apr 17, 2025  Office Visit     Reviewed patients past medical history and pertinent chart review prior to patients visit today.     Dermatology Problem List:  Last skin check: 04/17/25    0. NUB right lower back, shave biopsy 04/17/25 .    1. Actinic Keratosis   AK - left mid helix - bx 4/1/22  -s/p cryotherapy  2. History of atypical/dysplastic nevi  -Junctional dysplastic nevus with mild atypia, left scapula, bx 3/26/21  -Lentiginous compound melanocytic nevus with moderate to severe atypia, central posterior neck s/p biopsy 10/16/2019 s/p excision 12/02/2019  - Junctional nevus with atypical features, right (lateral) thigh, s/p excision 12/20/2016  - Junctional nevus with moderate dysplasia, lower paraspinal back, s/p biopsy 11/14/2016  - Junctional dysplastic nevus with mild atypia, left chest, s/p biopsy 4/10/2015  - Lentiginous compound melanocytic nevus with mild atypia, upper back, s/p biopsy 4/10/2015  - Patient reports two prior atypical lesions in 1980  3. Neurofibroma, upper mid back, s/p biopsy 11/9/2015  4. Folliculitis  5. Benign biopsies  - Macular seborrheic keratosis, left mid paraspinal back, bx 3/26/21    Personal Hx: no personal history of skin cancer  Family Hx: none   _________________________________________    Assessment & Plan:     # Neoplasm of uncertain behavior:  right lower back  DDx includes DN vs MM. Shave biopsy today.    Procedure Note: Biopsy by shave technique  The risks and benefits of the procedure were described to the patient. These include but are not limited to bleeding, infection, scar, incomplete removal, and non-diagnostic biopsy. Verbal informed consent was obtained. The  "above site(s) was cleansed with an alcohol pad and injected with 1% lidocaine with epinephrine. Once anesthesia was obtained, a biopsy(ies) was performed with Gilette blade. The tissue(s) was placed in a labeled container(s) with formalin and sent to pathology. Hemostasis was achieved with aluminum chloride. Vaseline and a bandage were applied to the wound(s). The patient tolerated the procedure well and was given post biopsy care instructions.    # Benign skin findings including: seborrheic keratoses, cherry angioma, lentigines and benign nevi.   - No further intervention required. Patient to report changes.   - Patient reassured of the benign nature of these lesions.    #Signs and Symptoms of non-melanoma skin cancer and ABCDEs of melanoma reviewed with patient. Patient encouraged to perform monthly self skin exams and educated on how to perform them. UV precautions reviewed with patient. Patient was asked about new or changing moles/lesions on body.     #Reviewed Sunscreen: Apply 20 minutes prior to going outdoors and reapply every two hours, when wet or sweating. We recommend using an SPF 30 or higher, and to use one that is water resistant.       Follow-up: 1 year for follow up full body skin exam, prn for new or changing lesions or new concerns    Emily Young PA-C  [facility]  Dermatology     ____________________________________________    CC: Skin Check (Pt states, \" Here for a fbse, has a history of AK, but no personal or family hx of skin cancer, has no new areas of concern today\" )    HPI:  Mr. Duane W Dahlstrom is a(n) 74 year old male who presents today as a return patient for a full body skin cancer screening. Patient has no new concerns today. Patient is trying to be diligent with photoprotection.       Patient is otherwise feeling well, without additional skin concerns.     Physical Exam:  Vitals: Ht 1.754 m (5' 9.06\")   Wt 76.7 kg (169 lb)   BMI 24.92 kg/m    SKIN: Total skin excluding " the genitalia areas was performed. The exam included the head/face, neck, both arms, chest, back, abdomen, both legs, digits, buttock and nails.   -NUB, right lower back, tan to flesh colored asymmetric papule   -several 1-2mm red dome shaped symmetric papules scattered on the trunk  -multiple tan/brown flat round macules and raised papules scattered throughout trunk, extremities and head. No worrisome features for malignancy noted on examination.  -scattered tan, homogenous macules scattered on sun exposed areas of trunk, extremities and face.   -scattered waxy, stuck on tan/brown papules and patches on the trunk     - No other lesions of concern on areas examined.     Medications:  No current outpatient medications on file.     No current facility-administered medications for this visit.      Past Medical History:   Patient Active Problem List   Diagnosis     Varicose veins     CARDIOVASCULAR SCREENING; LDL GOAL LESS THAN 160     Erectile dysfunction, unspecified erectile dysfunction type     Peyronie's disease     History of dysplastic nevus     Past Medical History:   Diagnosis Date     Allergic rhinitis, cause unspecified     Seasonal rhinitis     Syncope and collapse 1998     Varicose veins of leg        CC Lulú Fish MD  [address] [place] [phone] on close of this encounter.      Again, thank you for allowing me to participate in the care of your patient.        Sincerely,        Emily Young PA-C    Electronically signed

## 2025-04-24 ENCOUNTER — TELEPHONE (OUTPATIENT)
Dept: DERMATOLOGY | Facility: CLINIC | Age: 75
End: 2025-04-24
Payer: COMMERCIAL

## 2025-04-24 NOTE — TELEPHONE ENCOUNTER
Pt read SecureAlert message and will call the clinic with any questions or concerns.   Melly Wallace RN on 4/24/2025 at 9:22 AM    Final Diagnosis  Right lower back:  - Predominantly intradermal melanocytic nevus - (see description)  Electronically signed by Michael Price MD on 4/20/2025 at  9:35 PM       Dear Duane W Dahlstrom,     I am reaching out on behalf of Emily Young PA-C. Your biopsy results from the right lower back showed a benign intradermal melanocytic nevus (mole). No further treatment is needed at site. Continue the wound care until the biopsy site is fully healed. Please do not hesitate to contact me if you have any questions.     Sincerely,  Jesenia Scott PA-C  [facility]  Written by Lakia Scott PA-C on 4/21/2025  7:46 AM CDT  Seen by patient Duane W Dahlstrom on 4/21/2025  7:54 AM

## 2025-06-10 ENCOUNTER — TELEPHONE (OUTPATIENT)
Dept: FAMILY MEDICINE | Facility: CLINIC | Age: 75
End: 2025-06-10
Payer: COMMERCIAL

## 2025-06-10 NOTE — TELEPHONE ENCOUNTER
Patient Quality Outreach    Patient is due for the following:   Physical Annual Wellness Visit    Action(s) Taken:   Patient has upcoming appointment, these items will be addressed at that time.    Type of outreach:    Chart review performed, no outreach needed.    Questions for provider review:    None         Dulce Cobos, VF  Chart routed to None.

## (undated) RX ORDER — PROPOFOL 10 MG/ML
INJECTION, EMULSION INTRAVENOUS
Status: DISPENSED
Start: 2021-03-22

## (undated) RX ORDER — LIDOCAINE HYDROCHLORIDE 20 MG/ML
INJECTION, SOLUTION EPIDURAL; INFILTRATION; INTRACAUDAL; PERINEURAL
Status: DISPENSED
Start: 2021-03-22